# Patient Record
Sex: MALE | Race: WHITE | NOT HISPANIC OR LATINO | Employment: OTHER | URBAN - METROPOLITAN AREA
[De-identification: names, ages, dates, MRNs, and addresses within clinical notes are randomized per-mention and may not be internally consistent; named-entity substitution may affect disease eponyms.]

---

## 2018-01-26 ENCOUNTER — APPOINTMENT (EMERGENCY)
Dept: RADIOLOGY | Facility: HOSPITAL | Age: 83
DRG: 698 | End: 2018-01-26
Payer: MEDICARE

## 2018-01-26 ENCOUNTER — HOSPITAL ENCOUNTER (INPATIENT)
Facility: HOSPITAL | Age: 83
LOS: 6 days | Discharge: NURSING, REHAB OR LONG-TERM CARE | DRG: 698 | End: 2018-02-02
Attending: EMERGENCY MEDICINE | Admitting: INTERNAL MEDICINE
Payer: MEDICARE

## 2018-01-26 DIAGNOSIS — N39.0 UTI (URINARY TRACT INFECTION) DUE TO URINARY INDWELLING CATHETER (HCC): ICD-10-CM

## 2018-01-26 DIAGNOSIS — A41.9 SEVERE SEPSIS (HCC): Primary | ICD-10-CM

## 2018-01-26 DIAGNOSIS — T83.511A UTI (URINARY TRACT INFECTION) DUE TO URINARY INDWELLING CATHETER (HCC): ICD-10-CM

## 2018-01-26 DIAGNOSIS — J18.9 PNEUMONIA: ICD-10-CM

## 2018-01-26 DIAGNOSIS — I10 ESSENTIAL HYPERTENSION: ICD-10-CM

## 2018-01-26 DIAGNOSIS — R65.20 SEVERE SEPSIS (HCC): Primary | ICD-10-CM

## 2018-01-26 DIAGNOSIS — N39.0 ACUTE UTI: ICD-10-CM

## 2018-01-26 LAB
ALBUMIN SERPL BCP-MCNC: 2.5 G/DL (ref 3.5–5)
ALP SERPL-CCNC: 58 U/L (ref 46–116)
ALT SERPL W P-5'-P-CCNC: 53 U/L (ref 12–78)
ANION GAP SERPL CALCULATED.3IONS-SCNC: 14 MMOL/L (ref 4–13)
APTT PPP: 46 SECONDS (ref 23–35)
AST SERPL W P-5'-P-CCNC: 74 U/L (ref 5–45)
BASOPHILS # BLD MANUAL: 0 THOUSAND/UL (ref 0–0.1)
BASOPHILS NFR MAR MANUAL: 0 % (ref 0–1)
BILIRUB SERPL-MCNC: 0.3 MG/DL (ref 0.2–1)
BILIRUB UR QL STRIP: ABNORMAL
BUN SERPL-MCNC: 43 MG/DL (ref 5–25)
CALCIUM SERPL-MCNC: 8.4 MG/DL (ref 8.3–10.1)
CHLORIDE SERPL-SCNC: 110 MMOL/L (ref 100–108)
CLARITY UR: ABNORMAL
CO2 SERPL-SCNC: 25 MMOL/L (ref 21–32)
COLOR UR: ABNORMAL
CREAT SERPL-MCNC: 3.11 MG/DL (ref 0.6–1.3)
EOSINOPHIL # BLD MANUAL: 0 THOUSAND/UL (ref 0–0.4)
EOSINOPHIL NFR BLD MANUAL: 0 % (ref 0–6)
ERYTHROCYTE [DISTWIDTH] IN BLOOD BY AUTOMATED COUNT: 16.8 % (ref 11.6–15.1)
GFR SERPL CREATININE-BSD FRML MDRD: 18 ML/MIN/1.73SQ M
GLUCOSE SERPL-MCNC: 153 MG/DL (ref 65–140)
GLUCOSE UR STRIP-MCNC: NEGATIVE MG/DL
HCT VFR BLD AUTO: 48.2 % (ref 36.5–49.3)
HGB BLD-MCNC: 15.2 G/DL (ref 12–17)
HGB UR QL STRIP.AUTO: ABNORMAL
INR PPP: 1.68 (ref 0.86–1.16)
KETONES UR STRIP-MCNC: ABNORMAL MG/DL
LACTATE SERPL-SCNC: 3.9 MMOL/L (ref 0.5–2)
LEUKOCYTE ESTERASE UR QL STRIP: ABNORMAL
LYMPHOCYTES # BLD AUTO: 1.91 THOUSAND/UL (ref 0.6–4.47)
LYMPHOCYTES # BLD AUTO: 19 % (ref 14–44)
MAGNESIUM SERPL-MCNC: 2.1 MG/DL (ref 1.6–2.6)
MCH RBC QN AUTO: 27.1 PG (ref 26.8–34.3)
MCHC RBC AUTO-ENTMCNC: 31.5 G/DL (ref 31.4–37.4)
MCV RBC AUTO: 86 FL (ref 82–98)
MONOCYTES # BLD AUTO: 0.4 THOUSAND/UL (ref 0–1.22)
MONOCYTES NFR BLD: 4 % (ref 4–12)
NEUTROPHILS # BLD MANUAL: 7.05 THOUSAND/UL (ref 1.85–7.62)
NEUTS BAND NFR BLD MANUAL: 1 % (ref 0–8)
NEUTS SEG NFR BLD AUTO: 69 % (ref 43–75)
NITRITE UR QL STRIP: POSITIVE
PH UR STRIP.AUTO: 5 [PH] (ref 4.5–8)
PLATELET # BLD AUTO: 288 THOUSANDS/UL (ref 149–390)
PLATELET BLD QL SMEAR: ADEQUATE
PLATELET CLUMP BLD QL SMEAR: PRESENT
PMV BLD AUTO: 10.4 FL (ref 8.9–12.7)
POTASSIUM SERPL-SCNC: 3.3 MMOL/L (ref 3.5–5.3)
PROT SERPL-MCNC: 8.2 G/DL (ref 6.4–8.2)
PROT UR STRIP-MCNC: >=300 MG/DL
PROTHROMBIN TIME: 19.6 SECONDS (ref 12.1–14.4)
RBC # BLD AUTO: 5.61 MILLION/UL (ref 3.88–5.62)
RBC MORPH BLD: NORMAL
SODIUM SERPL-SCNC: 149 MMOL/L (ref 136–145)
SP GR UR STRIP.AUTO: 1.02 (ref 1–1.03)
TOTAL CELLS COUNTED SPEC: 100
UROBILINOGEN UR QL STRIP.AUTO: 1 E.U./DL
VARIANT LYMPHS # BLD AUTO: 7 %
WBC # BLD AUTO: 10.07 THOUSAND/UL (ref 4.31–10.16)

## 2018-01-26 PROCEDURE — 93005 ELECTROCARDIOGRAM TRACING: CPT

## 2018-01-26 PROCEDURE — 87077 CULTURE AEROBIC IDENTIFY: CPT

## 2018-01-26 PROCEDURE — 87186 SC STD MICRODIL/AGAR DIL: CPT

## 2018-01-26 PROCEDURE — 85027 COMPLETE CBC AUTOMATED: CPT

## 2018-01-26 PROCEDURE — 83735 ASSAY OF MAGNESIUM: CPT | Performed by: EMERGENCY MEDICINE

## 2018-01-26 PROCEDURE — 87086 URINE CULTURE/COLONY COUNT: CPT

## 2018-01-26 PROCEDURE — 85730 THROMBOPLASTIN TIME PARTIAL: CPT

## 2018-01-26 PROCEDURE — 87147 CULTURE TYPE IMMUNOLOGIC: CPT

## 2018-01-26 PROCEDURE — 85007 BL SMEAR W/DIFF WBC COUNT: CPT

## 2018-01-26 PROCEDURE — 96361 HYDRATE IV INFUSION ADD-ON: CPT

## 2018-01-26 PROCEDURE — 85610 PROTHROMBIN TIME: CPT

## 2018-01-26 PROCEDURE — 80053 COMPREHEN METABOLIC PANEL: CPT

## 2018-01-26 PROCEDURE — 87040 BLOOD CULTURE FOR BACTERIA: CPT

## 2018-01-26 PROCEDURE — 96365 THER/PROPH/DIAG IV INF INIT: CPT

## 2018-01-26 PROCEDURE — 83605 ASSAY OF LACTIC ACID: CPT

## 2018-01-26 PROCEDURE — 36415 COLL VENOUS BLD VENIPUNCTURE: CPT

## 2018-01-26 PROCEDURE — 71045 X-RAY EXAM CHEST 1 VIEW: CPT

## 2018-01-26 PROCEDURE — 81001 URINALYSIS AUTO W/SCOPE: CPT

## 2018-01-26 RX ORDER — LEVOFLOXACIN 5 MG/ML
500 INJECTION, SOLUTION INTRAVENOUS ONCE
Status: COMPLETED | OUTPATIENT
Start: 2018-01-26 | End: 2018-01-26

## 2018-01-26 RX ORDER — ACETAMINOPHEN 325 MG/1
650 TABLET ORAL EVERY 6 HOURS PRN
COMMUNITY

## 2018-01-26 RX ORDER — POTASSIUM CHLORIDE 14.9 MG/ML
20 INJECTION INTRAVENOUS ONCE
Status: COMPLETED | OUTPATIENT
Start: 2018-01-26 | End: 2018-01-27

## 2018-01-26 RX ADMIN — SODIUM CHLORIDE 1000 ML: 0.9 INJECTION, SOLUTION INTRAVENOUS at 23:22

## 2018-01-26 RX ADMIN — LEVOFLOXACIN 500 MG: 5 INJECTION, SOLUTION INTRAVENOUS at 22:39

## 2018-01-26 RX ADMIN — SODIUM CHLORIDE 1000 ML: 0.9 INJECTION, SOLUTION INTRAVENOUS at 22:36

## 2018-01-27 PROBLEM — A41.9 SEPSIS (HCC): Status: ACTIVE | Noted: 2018-01-27

## 2018-01-27 PROBLEM — R50.9 FEVER: Status: ACTIVE | Noted: 2018-01-27

## 2018-01-27 PROBLEM — N39.0 UTI (URINARY TRACT INFECTION) DUE TO URINARY INDWELLING CATHETER (HCC): Status: ACTIVE | Noted: 2018-01-27

## 2018-01-27 PROBLEM — T83.511A UTI (URINARY TRACT INFECTION) DUE TO URINARY INDWELLING CATHETER (HCC): Status: ACTIVE | Noted: 2018-01-27

## 2018-01-27 PROBLEM — J18.9 PNEUMONIA DUE TO INFECTIOUS ORGANISM: Status: ACTIVE | Noted: 2018-01-27

## 2018-01-27 PROBLEM — I95.9 HYPOTENSION: Status: ACTIVE | Noted: 2018-01-27

## 2018-01-27 LAB
ALBUMIN SERPL BCP-MCNC: 1.7 G/DL (ref 3.5–5)
ALP SERPL-CCNC: 42 U/L (ref 46–116)
ALT SERPL W P-5'-P-CCNC: 35 U/L (ref 12–78)
ANION GAP SERPL CALCULATED.3IONS-SCNC: 10 MMOL/L (ref 4–13)
ANION GAP SERPL CALCULATED.3IONS-SCNC: 12 MMOL/L (ref 4–13)
ANISOCYTOSIS BLD QL SMEAR: PRESENT
APTT PPP: 107 SECONDS (ref 23–35)
APTT PPP: 39 SECONDS (ref 23–35)
APTT PPP: 84 SECONDS (ref 23–35)
AST SERPL W P-5'-P-CCNC: 53 U/L (ref 5–45)
BACTERIA UR QL AUTO: ABNORMAL /HPF
BACTERIA UR QL AUTO: ABNORMAL /HPF
BASOPHILS # BLD MANUAL: 0 THOUSAND/UL (ref 0–0.1)
BASOPHILS NFR MAR MANUAL: 0 % (ref 0–1)
BILIRUB SERPL-MCNC: 0.2 MG/DL (ref 0.2–1)
BILIRUB UR QL STRIP: NEGATIVE
BUN SERPL-MCNC: 41 MG/DL (ref 5–25)
BUN SERPL-MCNC: 42 MG/DL (ref 5–25)
CALCIUM SERPL-MCNC: 7.2 MG/DL (ref 8.3–10.1)
CALCIUM SERPL-MCNC: 7.2 MG/DL (ref 8.3–10.1)
CHLORIDE SERPL-SCNC: 116 MMOL/L (ref 100–108)
CHLORIDE SERPL-SCNC: 118 MMOL/L (ref 100–108)
CLARITY UR: ABNORMAL
CO2 SERPL-SCNC: 22 MMOL/L (ref 21–32)
CO2 SERPL-SCNC: 23 MMOL/L (ref 21–32)
COLOR UR: YELLOW
CREAT SERPL-MCNC: 2.06 MG/DL (ref 0.6–1.3)
CREAT SERPL-MCNC: 2.73 MG/DL (ref 0.6–1.3)
DACRYOCYTES BLD QL SMEAR: PRESENT
EOSINOPHIL # BLD MANUAL: 0 THOUSAND/UL (ref 0–0.4)
EOSINOPHIL NFR BLD MANUAL: 0 % (ref 0–6)
ERYTHROCYTE [DISTWIDTH] IN BLOOD BY AUTOMATED COUNT: 16.7 % (ref 11.6–15.1)
FINE GRAN CASTS URNS QL MICRO: ABNORMAL /LPF
FLUAV AG SPEC QL: NORMAL
FLUBV AG SPEC QL: NORMAL
GFR SERPL CREATININE-BSD FRML MDRD: 21 ML/MIN/1.73SQ M
GFR SERPL CREATININE-BSD FRML MDRD: 30 ML/MIN/1.73SQ M
GLUCOSE SERPL-MCNC: 112 MG/DL (ref 65–140)
GLUCOSE SERPL-MCNC: 130 MG/DL (ref 65–140)
GLUCOSE SERPL-MCNC: 134 MG/DL (ref 65–140)
GLUCOSE SERPL-MCNC: 139 MG/DL (ref 65–140)
GLUCOSE SERPL-MCNC: 139 MG/DL (ref 65–140)
GLUCOSE SERPL-MCNC: 97 MG/DL (ref 65–140)
GLUCOSE UR STRIP-MCNC: NEGATIVE MG/DL
HCT VFR BLD AUTO: 39.4 % (ref 36.5–49.3)
HGB BLD-MCNC: 12 G/DL (ref 12–17)
HGB UR QL STRIP.AUTO: ABNORMAL
HYPERCHROMIA BLD QL SMEAR: PRESENT
KETONES UR STRIP-MCNC: NEGATIVE MG/DL
L PNEUMO1 AG UR QL IA.RAPID: NEGATIVE
LACTATE SERPL-SCNC: 1.6 MMOL/L (ref 0.5–2)
LACTATE SERPL-SCNC: 2.1 MMOL/L (ref 0.5–2)
LACTATE SERPL-SCNC: 2.5 MMOL/L (ref 0.5–2)
LACTATE SERPL-SCNC: 4.6 MMOL/L (ref 0.5–2)
LEUKOCYTE ESTERASE UR QL STRIP: ABNORMAL
LG PLATELETS BLD QL SMEAR: PRESENT
LYMPHOCYTES # BLD AUTO: 1.16 THOUSAND/UL (ref 0.6–4.47)
LYMPHOCYTES # BLD AUTO: 14 % (ref 14–44)
MAGNESIUM SERPL-MCNC: 1.7 MG/DL (ref 1.6–2.6)
MCH RBC QN AUTO: 26.3 PG (ref 26.8–34.3)
MCHC RBC AUTO-ENTMCNC: 30.5 G/DL (ref 31.4–37.4)
MCV RBC AUTO: 86 FL (ref 82–98)
MONOCYTES # BLD AUTO: 0.17 THOUSAND/UL (ref 0–1.22)
MONOCYTES NFR BLD: 2 % (ref 4–12)
MUCOUS THREADS UR QL AUTO: ABNORMAL
NEUTROPHILS # BLD MANUAL: 6.77 THOUSAND/UL (ref 1.85–7.62)
NEUTS BAND NFR BLD MANUAL: 12 % (ref 0–8)
NEUTS SEG NFR BLD AUTO: 70 % (ref 43–75)
NITRITE UR QL STRIP: NEGATIVE
NON-SQ EPI CELLS URNS QL MICRO: ABNORMAL /HPF
NON-SQ EPI CELLS URNS QL MICRO: ABNORMAL /HPF
OVALOCYTES BLD QL SMEAR: PRESENT
PH UR STRIP.AUTO: 6 [PH] (ref 4.5–8)
PHOSPHATE SERPL-MCNC: 4.5 MG/DL (ref 2.3–4.1)
PLATELET # BLD AUTO: 224 THOUSANDS/UL (ref 149–390)
PLATELET # BLD AUTO: 234 THOUSANDS/UL (ref 149–390)
PLATELET BLD QL SMEAR: ADEQUATE
PMV BLD AUTO: 10.3 FL (ref 8.9–12.7)
PMV BLD AUTO: 9.9 FL (ref 8.9–12.7)
POIKILOCYTOSIS BLD QL SMEAR: PRESENT
POLYCHROMASIA BLD QL SMEAR: PRESENT
POTASSIUM SERPL-SCNC: 3.1 MMOL/L (ref 3.5–5.3)
POTASSIUM SERPL-SCNC: 3.6 MMOL/L (ref 3.5–5.3)
PROT SERPL-MCNC: 6 G/DL (ref 6.4–8.2)
PROT UR STRIP-MCNC: ABNORMAL MG/DL
RBC # BLD AUTO: 4.56 MILLION/UL (ref 3.88–5.62)
RBC #/AREA URNS AUTO: ABNORMAL /HPF
RBC #/AREA URNS AUTO: ABNORMAL /HPF
RBC MORPH BLD: PRESENT
RSV B RNA SPEC QL NAA+PROBE: NORMAL
S PNEUM AG UR QL: NEGATIVE
SODIUM SERPL-SCNC: 150 MMOL/L (ref 136–145)
SODIUM SERPL-SCNC: 151 MMOL/L (ref 136–145)
SP GR UR STRIP.AUTO: 1.02 (ref 1–1.03)
TOTAL CELLS COUNTED SPEC: 100
UROBILINOGEN UR QL STRIP.AUTO: 0.2 E.U./DL
VARIANT LYMPHS # BLD AUTO: 2 %
WBC # BLD AUTO: 8.25 THOUSAND/UL (ref 4.31–10.16)
WBC #/AREA URNS AUTO: ABNORMAL /HPF
WBC #/AREA URNS AUTO: ABNORMAL /HPF
WBC CLUMPS # UR AUTO: PRESENT /UL
WBC TOXIC VACUOLES BLD QL SMEAR: PRESENT

## 2018-01-27 PROCEDURE — 3C1ZX8Z IRRIGATION OF INDWELLING DEVICE USING IRRIGATING SUBSTANCE, EXTERNAL APPROACH: ICD-10-PCS | Performed by: UROLOGY

## 2018-01-27 PROCEDURE — 87077 CULTURE AEROBIC IDENTIFY: CPT | Performed by: INTERNAL MEDICINE

## 2018-01-27 PROCEDURE — 80053 COMPREHEN METABOLIC PANEL: CPT | Performed by: INTERNAL MEDICINE

## 2018-01-27 PROCEDURE — 51705 CHANGE OF BLADDER TUBE: CPT | Performed by: UROLOGY

## 2018-01-27 PROCEDURE — 92610 EVALUATE SWALLOWING FUNCTION: CPT

## 2018-01-27 PROCEDURE — 05H433Z INSERTION OF INFUSION DEVICE INTO LEFT INNOMINATE VEIN, PERCUTANEOUS APPROACH: ICD-10-PCS | Performed by: EMERGENCY MEDICINE

## 2018-01-27 PROCEDURE — 87449 NOS EACH ORGANISM AG IA: CPT | Performed by: INTERNAL MEDICINE

## 2018-01-27 PROCEDURE — 96375 TX/PRO/DX INJ NEW DRUG ADDON: CPT

## 2018-01-27 PROCEDURE — 85007 BL SMEAR W/DIFF WBC COUNT: CPT | Performed by: INTERNAL MEDICINE

## 2018-01-27 PROCEDURE — 99223 1ST HOSP IP/OBS HIGH 75: CPT | Performed by: UROLOGY

## 2018-01-27 PROCEDURE — 80048 BASIC METABOLIC PNL TOTAL CA: CPT | Performed by: INTERNAL MEDICINE

## 2018-01-27 PROCEDURE — 87147 CULTURE TYPE IMMUNOLOGIC: CPT | Performed by: INTERNAL MEDICINE

## 2018-01-27 PROCEDURE — 87186 SC STD MICRODIL/AGAR DIL: CPT | Performed by: INTERNAL MEDICINE

## 2018-01-27 PROCEDURE — 82948 REAGENT STRIP/BLOOD GLUCOSE: CPT

## 2018-01-27 PROCEDURE — 82272 OCCULT BLD FECES 1-3 TESTS: CPT

## 2018-01-27 PROCEDURE — 36415 COLL VENOUS BLD VENIPUNCTURE: CPT | Performed by: INTERNAL MEDICINE

## 2018-01-27 PROCEDURE — 85027 COMPLETE CBC AUTOMATED: CPT | Performed by: INTERNAL MEDICINE

## 2018-01-27 PROCEDURE — 99285 EMERGENCY DEPT VISIT HI MDM: CPT

## 2018-01-27 PROCEDURE — 84100 ASSAY OF PHOSPHORUS: CPT | Performed by: INTERNAL MEDICINE

## 2018-01-27 PROCEDURE — 87086 URINE CULTURE/COLONY COUNT: CPT | Performed by: INTERNAL MEDICINE

## 2018-01-27 PROCEDURE — 85049 AUTOMATED PLATELET COUNT: CPT | Performed by: INTERNAL MEDICINE

## 2018-01-27 PROCEDURE — 99291 CRITICAL CARE FIRST HOUR: CPT | Performed by: INTERNAL MEDICINE

## 2018-01-27 PROCEDURE — 0T2BX0Z CHANGE DRAINAGE DEVICE IN BLADDER, EXTERNAL APPROACH: ICD-10-PCS | Performed by: UROLOGY

## 2018-01-27 PROCEDURE — 83605 ASSAY OF LACTIC ACID: CPT | Performed by: INTERNAL MEDICINE

## 2018-01-27 PROCEDURE — 85730 THROMBOPLASTIN TIME PARTIAL: CPT | Performed by: INTERNAL MEDICINE

## 2018-01-27 PROCEDURE — 83735 ASSAY OF MAGNESIUM: CPT | Performed by: INTERNAL MEDICINE

## 2018-01-27 PROCEDURE — 87081 CULTURE SCREEN ONLY: CPT | Performed by: INTERNAL MEDICINE

## 2018-01-27 PROCEDURE — 87798 DETECT AGENT NOS DNA AMP: CPT | Performed by: EMERGENCY MEDICINE

## 2018-01-27 PROCEDURE — 81001 URINALYSIS AUTO W/SCOPE: CPT | Performed by: INTERNAL MEDICINE

## 2018-01-27 RX ORDER — MELATONIN
1000 DAILY
COMMUNITY

## 2018-01-27 RX ORDER — MORPHINE SULFATE 2 MG/ML
2 INJECTION, SOLUTION INTRAMUSCULAR; INTRAVENOUS ONCE
Status: COMPLETED | OUTPATIENT
Start: 2018-01-27 | End: 2018-01-27

## 2018-01-27 RX ORDER — POTASSIUM CHLORIDE 20 MEQ/1
20 TABLET, EXTENDED RELEASE ORAL 2 TIMES DAILY
Status: ON HOLD | COMMUNITY
End: 2018-02-13

## 2018-01-27 RX ORDER — ACETAMINOPHEN 325 MG/1
650 TABLET ORAL EVERY 6 HOURS PRN
Status: DISCONTINUED | OUTPATIENT
Start: 2018-01-27 | End: 2018-02-02 | Stop reason: HOSPADM

## 2018-01-27 RX ORDER — METOPROLOL SUCCINATE 100 MG/1
100 TABLET, EXTENDED RELEASE ORAL DAILY
COMMUNITY

## 2018-01-27 RX ORDER — DEXTROSE MONOHYDRATE 50 MG/ML
125 INJECTION, SOLUTION INTRAVENOUS CONTINUOUS
Status: DISCONTINUED | OUTPATIENT
Start: 2018-01-27 | End: 2018-01-28

## 2018-01-27 RX ORDER — FUROSEMIDE 80 MG
80 TABLET ORAL 2 TIMES DAILY
Status: ON HOLD | COMMUNITY
End: 2018-02-13

## 2018-01-27 RX ORDER — POTASSIUM CHLORIDE 14.9 MG/ML
20 INJECTION INTRAVENOUS
Status: DISCONTINUED | OUTPATIENT
Start: 2018-01-27 | End: 2018-01-27

## 2018-01-27 RX ORDER — HEPARIN SODIUM 1000 [USP'U]/ML
2000 INJECTION, SOLUTION INTRAVENOUS; SUBCUTANEOUS AS NEEDED
Status: DISCONTINUED | OUTPATIENT
Start: 2018-01-27 | End: 2018-02-01

## 2018-01-27 RX ORDER — LISINOPRIL 40 MG/1
40 TABLET ORAL
COMMUNITY
End: 2018-02-02 | Stop reason: HOSPADM

## 2018-01-27 RX ORDER — HEPARIN SODIUM 10000 [USP'U]/100ML
3-20 INJECTION, SOLUTION INTRAVENOUS
Status: DISCONTINUED | OUTPATIENT
Start: 2018-01-27 | End: 2018-02-01

## 2018-01-27 RX ORDER — VANCOMYCIN HYDROCHLORIDE 1 G/200ML
12.5 INJECTION, SOLUTION INTRAVENOUS EVERY 24 HOURS
Status: DISCONTINUED | OUTPATIENT
Start: 2018-01-27 | End: 2018-01-29

## 2018-01-27 RX ORDER — FERROUS SULFATE 325(65) MG
325 TABLET ORAL
COMMUNITY

## 2018-01-27 RX ORDER — RISPERIDONE 0.25 MG/1
0.5 TABLET, FILM COATED ORAL 2 TIMES DAILY
COMMUNITY

## 2018-01-27 RX ORDER — ACETAMINOPHEN 650 MG/1
650 SUPPOSITORY RECTAL ONCE
Status: COMPLETED | OUTPATIENT
Start: 2018-01-27 | End: 2018-01-27

## 2018-01-27 RX ORDER — DIVALPROEX SODIUM 250 MG/1
250 TABLET, DELAYED RELEASE ORAL 2 TIMES DAILY
COMMUNITY

## 2018-01-27 RX ORDER — DILTIAZEM HYDROCHLORIDE 120 MG/1
120 CAPSULE, EXTENDED RELEASE ORAL DAILY
COMMUNITY

## 2018-01-27 RX ORDER — SERTRALINE HYDROCHLORIDE 100 MG/1
50 TABLET, FILM COATED ORAL DAILY
COMMUNITY

## 2018-01-27 RX ORDER — HEPARIN SODIUM 1000 [USP'U]/ML
4000 INJECTION, SOLUTION INTRAVENOUS; SUBCUTANEOUS AS NEEDED
Status: DISCONTINUED | OUTPATIENT
Start: 2018-01-27 | End: 2018-02-01

## 2018-01-27 RX ORDER — HYDRALAZINE HYDROCHLORIDE 50 MG/1
50 TABLET, FILM COATED ORAL 3 TIMES DAILY
COMMUNITY

## 2018-01-27 RX ORDER — DEXTROSE, SODIUM CHLORIDE, AND POTASSIUM CHLORIDE 5; .45; .3 G/100ML; G/100ML; G/100ML
125 INJECTION INTRAVENOUS CONTINUOUS
Status: DISCONTINUED | OUTPATIENT
Start: 2018-01-27 | End: 2018-01-27

## 2018-01-27 RX ORDER — SODIUM CHLORIDE 9 MG/ML
125 INJECTION, SOLUTION INTRAVENOUS CONTINUOUS
Status: DISCONTINUED | OUTPATIENT
Start: 2018-01-27 | End: 2018-01-27

## 2018-01-27 RX ORDER — SODIUM CHLORIDE 450 MG/100ML
150 INJECTION, SOLUTION INTRAVENOUS CONTINUOUS
Status: DISCONTINUED | OUTPATIENT
Start: 2018-01-27 | End: 2018-01-27

## 2018-01-27 RX ORDER — OSELTAMIVIR PHOSPHATE 6 MG/ML
30 FOR SUSPENSION ORAL EVERY 24 HOURS
Status: DISCONTINUED | OUTPATIENT
Start: 2018-01-27 | End: 2018-01-27

## 2018-01-27 RX ORDER — LORAZEPAM 2 MG/ML
0.25 INJECTION INTRAMUSCULAR EVERY 6 HOURS PRN
Status: DISCONTINUED | OUTPATIENT
Start: 2018-01-27 | End: 2018-02-02 | Stop reason: HOSPADM

## 2018-01-27 RX ORDER — ATORVASTATIN CALCIUM 10 MG/1
10 TABLET, FILM COATED ORAL DAILY
COMMUNITY

## 2018-01-27 RX ORDER — ONDANSETRON 2 MG/ML
4 INJECTION INTRAMUSCULAR; INTRAVENOUS EVERY 6 HOURS PRN
Status: DISCONTINUED | OUTPATIENT
Start: 2018-01-27 | End: 2018-02-02 | Stop reason: HOSPADM

## 2018-01-27 RX ORDER — LORAZEPAM 0.5 MG/1
0.5 TABLET ORAL 3 TIMES DAILY
COMMUNITY
End: 2018-02-02 | Stop reason: HOSPADM

## 2018-01-27 RX ADMIN — VALPROATE SODIUM 250 MG: 100 INJECTION, SOLUTION INTRAVENOUS at 21:21

## 2018-01-27 RX ADMIN — Medication 500 MG: at 06:46

## 2018-01-27 RX ADMIN — OSELTAMIVIR PHOSPHATE 30 MG: 75 CAPSULE ORAL at 09:26

## 2018-01-27 RX ADMIN — METRONIDAZOLE 500 MG: 500 INJECTION, SOLUTION INTRAVENOUS at 04:33

## 2018-01-27 RX ADMIN — DEXTROSE 125 ML/HR: 5 SOLUTION INTRAVENOUS at 22:33

## 2018-01-27 RX ADMIN — METRONIDAZOLE 500 MG: 500 INJECTION, SOLUTION INTRAVENOUS at 11:03

## 2018-01-27 RX ADMIN — Medication 500 MG: at 18:10

## 2018-01-27 RX ADMIN — VANCOMYCIN HYDROCHLORIDE 1000 MG: 1 INJECTION, SOLUTION INTRAVENOUS at 04:35

## 2018-01-27 RX ADMIN — POTASSIUM CHLORIDE 20 MEQ: 200 INJECTION, SOLUTION INTRAVENOUS at 00:15

## 2018-01-27 RX ADMIN — POTASSIUM CHLORIDE 20 MEQ: 200 INJECTION, SOLUTION INTRAVENOUS at 13:46

## 2018-01-27 RX ADMIN — ACETAMINOPHEN 650 MG: 650 SUPPOSITORY RECTAL at 00:27

## 2018-01-27 RX ADMIN — DEXTROSE, SODIUM CHLORIDE, AND POTASSIUM CHLORIDE 125 ML/HR: 5; .45; .3 INJECTION INTRAVENOUS at 11:03

## 2018-01-27 RX ADMIN — DEXTROSE, SODIUM CHLORIDE, AND POTASSIUM CHLORIDE 125 ML/HR: 5; .45; .3 INJECTION INTRAVENOUS at 20:38

## 2018-01-27 RX ADMIN — POTASSIUM CHLORIDE 20 MEQ: 200 INJECTION, SOLUTION INTRAVENOUS at 11:03

## 2018-01-27 RX ADMIN — HEPARIN SODIUM AND DEXTROSE 11.1 UNITS/KG/HR: 10000; 5 INJECTION INTRAVENOUS at 06:04

## 2018-01-27 RX ADMIN — SODIUM CHLORIDE 125 ML/HR: 0.9 INJECTION, SOLUTION INTRAVENOUS at 00:22

## 2018-01-27 RX ADMIN — METRONIDAZOLE 500 MG: 500 INJECTION, SOLUTION INTRAVENOUS at 19:38

## 2018-01-27 RX ADMIN — SODIUM CHLORIDE 150 ML/HR: 0.45 INJECTION, SOLUTION INTRAVENOUS at 04:20

## 2018-01-27 RX ADMIN — VALPROATE SODIUM 250 MG: 100 INJECTION, SOLUTION INTRAVENOUS at 09:30

## 2018-01-27 RX ADMIN — MORPHINE SULFATE 2 MG: 2 INJECTION, SOLUTION INTRAMUSCULAR; INTRAVENOUS at 01:48

## 2018-01-27 NOTE — CONSULTS
Consultation - Infectious Disease   Cayetano Benson [de-identified] y o  male MRN: [id number]  Unit/Bed#: -02 Encounter: [id number]      Assessment/Plan   1  Sepsis/Lactic acidosis/Renal insufficiency/Obstructive uropathy/Pyuria/Left femoral CVC: [de-identified] yo male presenting from [facility] w/ worsening mental status despite three days of empiric levofloxacin for cUTI/PNA  UA collected from old suprapubic catheter was notable for rufus pyuria and CXR with RLL linear atelectasis vs scaring  Suspect cUTI most likely etiology given suprapubic catheter and underwhelming findings on CXR  Urology has changed Sp catheter today with cultures pending       - will repeat UA/micro and cx on new sample now that SP catheter exchanged today  - will f/u pending blood cx  - will repeat CXR tomorrow now that pt volume resuscitated   - will stop oseltamavir now that influenza A/B PCR negative  - continue broad-spectrum Abx while awaiting pending cultures  - will check vanco trough before 4th dose  - dose Abx for acute on chronic renal insufficiency      History of Present Illness   Physician Requesting Consult: Drew Guerrier MD  Reason for Consult / Principal Problem: Sepsis    HPI: Cayetano Benson is a [de-identified]y o  year old male with H/O dementia, DM, a fib w/ AICD, CKD, HTN, AAA s/p repair, hx of VTE, TERRELL s/p suprapubic catheter, who presented to ED from [facility] on 1/26/18 w/ report of AMS  Pt was reportedly in USOH until ~4 days PTA when he was reportedly not feeing well  At the [facility], he was diagnosed with a UTI and PNA in setting of cough and SOB  Pt was started on PO levofloxacin ~3 days PTA  Per EMR, pt initially improved; however, subsequently declined with worsening confusion and agitation  He notably had a temp to 100 6 @ SNF w/ cough and PALOMO  Pt was brought to ED where he was noted to have a lactic acidosis with grossly positive UA obtained from old SP catheter and CXR with RLL linear atelectasis vs  scarring    Pt was volume resuscitated and started on broad-spectrum Abx  ID consulted for additional management  Call to pt's SNF: Labs on 1/23 collected notable for WBC of 6 5 and Cr of 1 6  Urine cx collected at that time w/ >100K CFU of >3 species of Gram positive and Gram negative organisms  Levofloxacin 750mg PO q48hrs initiated on 1/23/18  Inpatient consult to Infectious Diseases  Consult performed by: Lv Pryor  Consult ordered by: Dilcia Salazar: Unable to obtain due to clinical status    Historical Information   History reviewed  No pertinent past medical history  History reviewed  No pertinent surgical history  Social History   History   Alcohol use Not on file     History   Drug use: Unknown     History   Smoking Status    Former Smoker   Smokeless Tobacco    Never Used     History reviewed  No pertinent family history      Meds/Allergies   Abx:  Levofloxacin - 1/23-1/26  Cefepime - 1/27-current  Vancomycin -  1/27-current  Metronidazole - 1/27-current  oseltamavir - 1/27-current      Current Facility-Administered Medications:     acetaminophen (TYLENOL) tablet 650 mg, 650 mg, Oral, Q6H PRN, Kianna Moser MD    cefepime (MAXIPIME) 500 mg in sodium chloride 0 9 % 50 mL IVPB, 500 mg, Intravenous, Q12H, Kianna TOMLIN MD, Last Rate: 100 mL/hr at 01/27/18 0646, 500 mg at 01/27/18 0646    dextrose 5 % and sodium chloride 0 45 % with KCl 40 mEq/L infusion (premix), 125 mL/hr, Intravenous, Continuous, Yamil Harper MD, Last Rate: 125 mL/hr at 01/27/18 1103, 125 mL/hr at 01/27/18 1103    heparin (porcine) 25,000 units in 250 mL infusion (premix), 3-20 Units/kg/hr (Order-Specific), Intravenous, Titrated, Kianna TOMLIN MD, Last Rate: 10 mL/hr at 01/27/18 0604, 11 1 Units/kg/hr at 01/27/18 0604    heparin (porcine) injection 2,000 Units, 2,000 Units, Intravenous, PRN, Kianna Moser MD    heparin (porcine) injection 4,000 Units, 4,000 Units, Intravenous, PRN, Kianna Frye MD NABOR    insulin lispro (HumaLOG) 100 units/mL subcutaneous injection 1-6 Units, 1-6 Units, Subcutaneous, Q6H **AND** Fingerstick Glucose (POCT), , , Q6H, Mandeep Greta Carrel, MD    LORazepam (ATIVAN) 2 mg/mL injection 0 25 mg, 0 25 mg, Intravenous, Q6H PRN, Kianna Caballero MD    metroNIDAZOLE (FLAGYL) IVPB (premix) 500 mg, 500 mg, Intravenous, Q8H, Kianna TOMLIN MD, Last Rate: 200 mL/hr at 01/27/18 1103, 500 mg at 01/27/18 1103    ondansetron (ZOFRAN) injection 4 mg, 4 mg, Intravenous, Q6H PRN, Kianna Caballero MD    oseltamivir (TAMIFLU) oral suspension 30 mg, 30 mg, Oral, Q24H, Kianna Caballero MD, 30 mg at 01/27/18 0926    valproate (DEPACON) 250 mg in sodium chloride 0 9 % 50 mL IVPB, 250 mg, Intravenous, Q12H, Kianna TOMLIN MD, Last Rate: 50 mL/hr at 01/27/18 0930, 250 mg at 01/27/18 0930    vancomycin (VANCOCIN) IVPB (premix) 1,000 mg, 12 5 mg/kg (Adjusted), Intravenous, Q24H, Kianna Caballero MD, Last Rate: 200 mL/hr at 01/27/18 0435, 1,000 mg at 01/27/18 0435    No Known Allergies      Intake/Output Summary (Last 24 hours) at 01/27/18 1633  Last data filed at 01/27/18 1500   Gross per 24 hour   Intake                0 ml   Output              535 ml   Net             -535 ml       PE:  Elderly drowsy male, NAD  VSS, Tmax:98 9  HEENT: NC in place on 1L O2, poor dentition, no icterus   NECK:Supple  CARDIAC: RRR, no MRG, ICD pocket well-healed without erythema or warmth  LUNGS: ant fields w/ coarse BS anteriorly   ABDOMEN: decreased BS, soft, SP catheter in place w/ mild erythema and scant cloudy drainage at insertion site  EXTREMITIES: no edema or joint effusions  SKIN: no rash  NEURO: opens eyes to voice, tracks, unable to follow commands    Invasive Devices:   CVC Central Lines 01/27/18 Triple 20cm (Active)   Site Assessment Clean; Intact;Dry 1/27/2018  5:00 AM   Proximal Lumen (Red Port-PICC only) Status Flushed;Blood return noted; Infusing 1/27/2018  5:00 AM   Medial Lumen Status Blood return noted; Flushed; Infusing 1/27/2018  5:00 AM   Distal Lumen ([place] Port-PICC only) Status Blood return noted; Flushed;Normal saline locked 1/27/2018  5:00 AM       Peripheral IV 01/26/18 Left Antecubital (Active)   Site Assessment Clean;Dry; Intact 1/27/2018  5:00 AM   Dressing Type Transparent 1/27/2018  5:00 AM   Line Status Flushed;Saline locked 1/27/2018  5:00 AM   Dressing Status Clean;Dry; Intact 1/27/2018  5:00 AM       Suprapubic Catheter (Active)   Site Assessment Red; Other (Comment) 1/27/2018  4:35 AM   Dressing Status Clean;Dry; Intact 1/27/2018  4:35 AM   Dressing Type Split gauze 1/27/2018  4:35 AM   Output (mL) 275 mL 1/27/2018  3:00 PM           Lab Results:   Admission on 01/26/2018   Component Date Value    PTT 01/26/2018 46*    Protime 01/26/2018 19 6*    INR 01/26/2018 1 68*    WBC 01/26/2018 10 07     RBC 01/26/2018 5 61     Hemoglobin 01/26/2018 15 2     Hematocrit 01/26/2018 48 2     MCV 01/26/2018 86     MCH 01/26/2018 27 1     MCHC 01/26/2018 31 5     RDW 01/26/2018 16 8*    MPV 01/26/2018 10 4     Platelets 39/92/0240 288     Sodium 01/26/2018 149*    Potassium 01/26/2018 3 3*    Chloride 01/26/2018 110*    CO2 01/26/2018 25     Anion Gap 01/26/2018 14*    BUN 01/26/2018 43*    Creatinine 01/26/2018 3 11*    Glucose 01/26/2018 153*    Calcium 01/26/2018 8 4     AST 01/26/2018 74*    ALT 01/26/2018 53     Alkaline Phosphatase 01/26/2018 58     Total Protein 01/26/2018 8 2     Albumin 01/26/2018 2 5*    Total Bilirubin 01/26/2018 0 30     eGFR 01/26/2018 18     LACTIC ACID 01/26/2018 3 9*    Color, UA 01/26/2018 Red     Clarity, UA 01/26/2018 Turbid     Specific Gravity, UA 01/26/2018 1 025     pH, UA 01/26/2018 5 0     Leukocytes, UA 01/26/2018 Moderate*    Nitrite, UA 01/26/2018 Positive*    Protein, UA 01/26/2018 >=300*    Glucose, UA 01/26/2018 Negative     Ketones, UA 01/26/2018 15 (1+)*    Urobilinogen, UA 01/26/2018 1 0     Bilirubin, UA 01/26/2018 Interference- unable to analyze*    Blood, UA 01/26/2018 Large*    Segmented % 01/26/2018 69     Bands % 01/26/2018 1     Lymphocytes % 01/26/2018 19     Monocytes % 01/26/2018 4     Eosinophils % 01/26/2018 0     Basophils % 01/26/2018 0     Atypical Lymphocytes % 01/26/2018 7*    Absolute Neutrophils 01/26/2018 7 05     Lymphocytes Absolute 01/26/2018 1 91     Monocytes Absolute 01/26/2018 0 40     Eosinophils Absolute 01/26/2018 0 00     Basophils Absolute 01/26/2018 0 00     Total Counted 01/26/2018 100     RBC Morphology 01/26/2018 Normal     Platelet Estimate 11/94/6270 Adequate     Clumped Platelets 86/03/6724 Present     Magnesium 01/26/2018 2 1     RBC, UA 01/26/2018 Innumerable*    WBC, UA 01/26/2018 Innumerable*    Epithelial Cells 01/26/2018 Field obscured, unable to enumerate*    Bacteria, UA 01/26/2018 Field obscured, unable to enumerate*    INFLU A PCR 01/27/2018 None Detected     INFLU B PCR 01/27/2018 None Detected     RSV PCR 01/27/2018 None Detected     LACTIC ACID 01/27/2018 4 6*    LACTIC ACID 01/27/2018 2 5*    PTT 01/27/2018 39*    Strep pneumoniae antigen* 01/27/2018 Negative     Legionella Urinary Antig* 01/27/2018 Negative     Platelets 34/28/7983 234     MPV 01/27/2018 10 3     Sodium 01/27/2018 151*    Potassium 01/27/2018 3 1*    Chloride 01/27/2018 116*    CO2 01/27/2018 23     Anion Gap 01/27/2018 12     BUN 01/27/2018 42*    Creatinine 01/27/2018 2 73*    Glucose 01/27/2018 134     Calcium 01/27/2018 7 2*    AST 01/27/2018 53*    ALT 01/27/2018 35     Alkaline Phosphatase 01/27/2018 42*    Total Protein 01/27/2018 6 0*    Albumin 01/27/2018 1 7*    Total Bilirubin 01/27/2018 0 20     eGFR 01/27/2018 21     Magnesium 01/27/2018 1 7     Phosphorus 01/27/2018 4 5*    WBC 01/27/2018 8 25     RBC 01/27/2018 4 56     Hemoglobin 01/27/2018 12 0     Hematocrit 01/27/2018 39 4     MCV 01/27/2018 86     MCH 01/27/2018 26 3*    MCHC 01/27/2018 30 5*    RDW 01/27/2018 16 7*    MPV 01/27/2018 9 9     Platelets 02/57/0242 224     LACTIC ACID 01/27/2018 2 1*    Segmented % 01/27/2018 70     Bands % 01/27/2018 12*    Lymphocytes % 01/27/2018 14     Monocytes % 01/27/2018 2*    Eosinophils % 01/27/2018 0     Basophils % 01/27/2018 0     Atypical Lymphocytes % 01/27/2018 2*    Absolute Neutrophils 01/27/2018 6 77     Lymphocytes Absolute 01/27/2018 1 16     Monocytes Absolute 01/27/2018 0 17     Eosinophils Absolute 01/27/2018 0 00     Basophils Absolute 01/27/2018 0 00     Total Counted 01/27/2018 100     Toxic Vacuolated Neutrop* 01/27/2018 Present     RBC Morphology 01/27/2018 Present     Anisocytosis 01/27/2018 Present     Hypochromia 01/27/2018 Present     Ovalocytes 01/27/2018 Present     Poikilocytes 01/27/2018 Present     Polychromasia 01/27/2018 Present     Tear Drop Cells 01/27/2018 Present     Platelet Estimate 54/96/9792 Adequate     Large Platelet 78/49/9640 Present     POC Glucose 01/27/2018 139     PTT 01/27/2018 84*    LACTIC ACID 01/27/2018 1 6     POC Glucose 01/27/2018 97     POC Glucose 01/27/2018 112     POC Glucose 01/27/2018 130      Imaging Studies: I have personally reviewed pertinent reports  and I have personally reviewed pertinent films in PACS  EKG, Pathology, and Other Studies: I have personally reviewed pertinent reports        Culture  No results found for: BLOODCX  No results found for: WOUNDCULT  No results found for: URINECX  No results found for: SPUTUMCULTUR    Principal Problem:    Sepsis ([address] )  Active Problems:    Fever    UTI (urinary tract infection) due to urinary indwelling catheter ([facility] [address] )    Hypotension    Pneumonia due to infectious organism

## 2018-01-27 NOTE — CASE MANAGEMENT
Initial Clinical Review    Admission: Date/Time/Statement: 1/27/18 @ [address] Admission (expected length of stay for this patient is greater than two midnights)     Standing Status:   Standing     Number of Occurrences:   1     Order Specific Question:   Admitting Physician     Answer:   Tiffanie Abreu [[id number]]     Order Specific Question:   Level of Care     Answer:   Level 1 Stepdown [13]     Order Specific Question:   Estimated length of stay     Answer:   More than 2 Midnights     Order Specific Question:   Certification     Answer:   I certify that inpatient services are medically necessary for this patient for a duration of greater than two midnights  See H&P and MD Progress Notes for additional information about the patient's course of treatment  ED: Date/Time/Mode of Arrival:   ED Arrival Information     Expected Arrival Acuity Means of Arrival Escorted By Service Admission Type    1/26/2018 21:49 1/26/2018 21:51 [facility] [address] [place] [address]) General Medicine Emergency    Arrival Complaint    FEVER          Chief Complaint:   Chief Complaint   Patient presents with    Altered Mental Status     Pt to ED from nursing home with altered mental status, currently being treated for pneumonia and UTI  History of Illness: [de-identified] y o  male with pmhx of diabetes type 2, hypertension, AAA s/p repair, hyperlipidemia, afib, s/p PPM, CKD with unknown baseline, history of PE and DVT, dementia that presents to E D from nursing home for changes in mental status  Pt unable to provide any history secondary to changes in mental status  Pts daughter at bedside  Pt said at his baseline with dementia but still able to interact some and expressive himself  Today he is very confused with only noted to be yelling 'hello' at intervals  Pts daughter says it is not usual for him to say this but the screaming is new  He would not follow commands or answer questions     He apparently had not been feeling good since Tuesday and was diagnosed with UTI initially but next day he developed some cough and shortness of breath and CXR done in NH and was noted to have PNA  Pt has been on Levaquin for about 3 days  Daughter says he appears initially to have responded to treatment but was called today that father had a change in his mental status  He has had fever with temp of 100 6 in the nursing facility, he has cough, shortness of breath and was wheezing  He received some breathing treatments  He has 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  In the E D, he was afebrile but noted hypotension with lactic acidosis of 3 9   His urinary showed evidence of UTI and his CXR suggestive of possible RLL pna      Daughter states no dysphagia but noted that sometimes he does choke on thin liquids    ED Vital Signs:   ED Triage Vitals   Temperature Pulse Respirations Blood Pressure SpO2   01/26/18 2158 01/26/18 2158 01/26/18 2158 01/26/18 2158 01/26/18 2158   98 2 °F (36 8 °C) (!) 110 20 (!) 88/54 94 %      Temp Source Heart Rate Source Patient Position - Orthostatic VS BP Location FiO2 (%)   01/26/18 2158 01/26/18 2158 01/26/18 2158 01/26/18 2158 --   Tympanic Monitor Lying Right arm       Pain Score       01/27/18 0059       3        Wt Readings from Last 1 Encounters:   01/27/18 104 kg (229 lb 11 5 oz)     O2 2L N/C    Vital Signs (abnormal):   Date/Time  Temp  Pulse  Resp  BP  MAP (mmHg)  SpO2  O2 Device  Patient Position - Orthostatic VS   01/27/18 0830  --  77  20  121/58  --  92 %  --  --       01/27/18 0345  --  --  --   89/53  --  --  --  --   01/27/18 0342  --  --  --   83/44  --  --  --  --   01/27/18 0256  98 3 °F (36 8 °C)  86  22  95/53  --  93 %  Nasal cannula  Lying   01/27/18 0115  --   107   26   86/51  --  95 %  Nasal cannula  Sitting   01/27/18 0100  --  105   26   89/53  --  94 %  Nasal cannula  Sitting   01/27/18 0030  --  105   26  103/60  --           Abnormal Labs:    01/26/18 2215 Sodium 136 - 145 mmol/L 149     Potassium 3 5 - 5 3 mmol/L 3 3     Chloride 100 - 108 mmol/L 110     CO2 21 - 32 mmol/L 25    Anion Gap 4 - 13 mmol/L 14     BUN 5 - 25 mg/dL 43     Creatinine 0 60 - 1 30 mg/dL 3 11     Comments: Standardized to IDMS reference method   Glucose 65 - 140 mg/dL 153        01/27/18 0056    LACTIC ACID 0 5 - 2 0 mmol/L 4 6             Diagnostic Test Results: CXR - pending    ED Treatment:   Medication Administration from 01/26/2018 2149 to 01/27/2018 0256       Date/Time Order Dose Route Action     01/26/2018 2314  EMS REPLENISHMENT MED 0  Does not apply Given to EMS     01/26/2018 2239 levofloxacin (LEVAQUIN) IVPB (premix) 500 mg 500 mg Intravenous New Bag     01/26/2018 2236 sodium chloride 0 9 % bolus 1,000 mL 1,000 mL Intravenous New Bag     01/26/2018 2322 sodium chloride 0 9 % bolus 1,000 mL 1,000 mL Intravenous New Bag     01/27/2018 0015 potassium chloride 20 mEq IVPB (premix) 20 mEq Intravenous New Bag     01/27/2018 0022 sodium chloride 0 9 % infusion 125 mL/hr Intravenous New Bag     01/27/2018 0027 acetaminophen (TYLENOL) rectal suppository 650 mg 650 mg Rectal Given     01/27/2018 0148 morphine injection 2 mg 2 mg Intravenous Given          Past Medical/Surgical History:    Active Ambulatory Problems     Diagnosis Date Noted    No Active Ambulatory Problems     Resolved Ambulatory Problems     Diagnosis Date Noted    No Resolved Ambulatory Problems     No Additional Past Medical History       Admitting Diagnosis: Pneumonia [J18 9]  Fever [R50 9]  Acute UTI [N39 0]  Severe sepsis ([address] ) [A41 9, R65 20]  UTI (urinary tract infection) due to urinary indwelling catheter ([facility] [address] ) [H73 943Y, N39 0]    Age/Sex: [de-identified] y o  male    Assessment:  [de-identified] yr old male with history of afib, htn, dm, hyperlipidemia, CKD, urinary retention with indwelling suprapubic catheter, presents with changes in mental status and fever in nursing home on current treatment with antibiotics for pna and UTI and found to be hypotension with lactic acidosis in E D     - Altered mental status, likely secondary to toxic metabolic encephalopathy  -Severe sepsis secondary to combination of UTI and PNA r/o inf viral infection  - VIVIAN on CKD(unknown baseline- but last documentation from NH listed BMP as wnl), suspect combination of volume depletion vs ATN  -Lactic acidosis secondary to sepsis  - UTI in setting of chronic indwelling catheter for urinary retention  -afib with RVR on xarelto  - history of PE and DVT on AC with xarelto  -Hypernatremia secondary to volume depletion  -Dementia  - Hyperlipidemia  -DNR/DNI     Plan:  - Pt initially responded to IV fluids in the ED after 2L bolus, but BP still in upper 80s  I will give another liter of fluids, if no improvement will initiate on vasopressors to keep SPB>90 and MAP>60  -Daughter initially reluctant to have central line placed but now agreeable, central been placed  Daughter indicated that father does not want to prolong life with long duration of antibiotics   She wants to see in current mgt will should improvement, if not she may want to readdress this  - Will continue with aggressive fluid resuscitation   -Hold his antihypertensive meds and diuretics   -Continue to obtain serial lactic acid levels  - Will place empirically on broad spectrum antibiotics with cefepime, vancomycin and flagyl  - Suspect PNA be due to aspiration  - Will keep NPO and get speech evaluation when mental status improves  - Follow up BMP in Am, if no improvement in renal function with hydration may consider renal evaluation  - May need suprapubic catheter changed, will get urology evaluation    -Since NPO will change his Xarelto to IV heparin giving his low GFR  -Follow up blood and urine culture sent  - Flu screen also sent, will place on droplet precautions     Code Status: DNR/DNI      Admission Orders:  NPO  Tele monitoring  FL barium swallow video w speech  Bld culture x2  Infectious Disease cons  Urology cons    Scheduled Meds:   cefepime 500 mg Intravenous Q12H   insulin lispro 1-6 Units Subcutaneous Q6H   metroNIDAZOLE 500 mg Intravenous Q8H   oseltamivir 30 mg Oral Q24H   potassium chloride 20 mEq Intravenous Q2H   valproate sodium 250 mg Intravenous Q12H   vancomycin 12 5 mg/kg (Adjusted) Intravenous Q24H     Continuous Infusions:   dextrose 5 % and sodium chloride 0 45 % with KCl 40 mEq/L 125 mL/hr Last Rate: 125 mL/hr (01/27/18 1103)   heparin (porcine) 3-20 Units/kg/hr (Order-Specific) Last Rate: 11 1 Units/kg/hr (01/27/18 0604)     PRN Meds:     acetaminophen    heparin (porcine)    LORazepam    ondansetron

## 2018-01-27 NOTE — SEPSIS NOTE
Sepsis Note   John Maloney [de-identified] y o  male MRN: [id number]  Unit/Bed#: -02 Encounter: [id number]            Initial Sepsis Screening     Row Name 01/27/18 0006                Is the patient's history suggestive of a new or worsening infection? (!)  Yes (Proceed)  -ZULY        Suspected source of infection pneumonia;urinary tract infection  -ZULY        Are two or more of the following signs & symptoms of infection both present and new to the patient?         Indicate SIRS criteria Tachycardia > 90 bpm;Tachypnea > 20 resp per min  -Rocael Lehman        If the answer is yes to both questions, suspicion of sepsis is present          If severe sepsis is present AND tissue hypoperfusion perists in the hour after fluid resuscitation or lactate > 4, the patient meets criteria for SEPTIC SHOCK          Are any of the following organ dysfunction criteria present within 6 hours of suspected infection and SIRS criteria that are NOT considered to be chronic conditions? (!)  Yes  -ZULY        Organ dysfunction Lactate > 2 0 mmol/L  -ZULY        Date of presentation of severe sepsis 01/27/18  -ZULY        Time of presentation of severe sepsis 0009  -ZULY        Tissue hypoperfusion persists in the hour after crystalloid fluid administration, evidenced, by either: * OR * Lactate level is greater to or equal 4 mmol/dL ( ___ mmol/dL in comment field)  -Rocael Lehman        Was hypotension present within one hour of the conclusion of crystalloid fluid administration?  Yes  -ZULY        Date of presentation of septic shock 01/27/18  -ZULY        Time of presentation of septic shock 0405  -ZULY          User Key  (r) = Recorded By, (t) = Taken By, (c) = Cosigned By    [address] Name Provider Type    [address], DO Physician

## 2018-01-27 NOTE — H&P
H&P Exam - Bhavya Thapa [de-identified] y o  male MRN: [id number]    Unit/Bed#: ED 08 Encounter: [id number]        History of Present Illness     HPI:  Bhavya Thapa is a [de-identified] y o  male with pmhx of diabetes type 2, hypertension, AAA s/p repair, hyperlipidemia, afib, s/p PPM, CKD with unknown baseline, history of PE and DVT, dementia that presents to E D from nursing home for changes in mental status  Pt unable to provide any history secondary to changes in mental status  Pts daughter at bedside  Pt said at his baseline with dementia but still able to interact some and expressive himself  Today he is very confused with only noted to be yelling 'hello' at intervals  Pts daughter says it is not usual for him to say this but the screaming is new  He would not follow commands or answer questions  He apparently had not been feeling good since Tuesday and was diagnosed with UTI initially but next day he developed some cough and shortness of breath and CXR done in NH and was noted to have PNA  Pt has been on Levaquin for about 3 days  Daughter says he appears initially to have responded to treatment but was called today that father had a change in his mental status  He has had fever with temp of 100 6 in the nursing facility, he has cough, shortness of breath and was wheezing  He received some breathing treatments  He has 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  In the E D, he was afebrile but noted hypotension with lactic acidosis of 3 9  His urinary showed evidence of UTI and his CXR suggestive of possible RLL pna  Daughter states no dysphagia but noted that sometimes he does choke on thin liquids  Historical Information   No past medical history on file    Patient Active Problem List   Diagnosis    Fever    Sepsis ([facility] [address] )    UTI (urinary tract infection) due to urinary indwelling catheter (HCC)    Hypotension    Pneumonia due to infectious organism     No past surgical history on file     Social History  Does not smoke or take alcohol  Currently at a nursing facility  Daughter indicated pt is DNR/DNI and would not was any unnecessary life prolonging measures  If he has to be on antibiotics, only want for a short period if prognosis good  History   Alcohol use Not on file     History   Drug use: Unknown     History   Smoking Status    Not on file   Smokeless Tobacco    Not on file       Family History: No family history on file      Meds/Allergies       Current Facility-Administered Medications:     oseltamivir (TAMIFLU) oral suspension 30 mg, 30 mg, Oral, Q24H, Kianna Hicks MD    potassium chloride 20 mEq IVPB (premix), 20 mEq, Intravenous, Once, Francheska Arana DO, Last Rate: 50 mL/hr at 01/27/18 0015, 20 mEq at 01/27/18 0015    sodium chloride 0 9 % infusion, 125 mL/hr, Intravenous, Continuous, Francheska Arana, DO, Last Rate: 125 mL/hr at 01/27/18 0022, 125 mL/hr at 01/27/18 0022    Current Outpatient Prescriptions:     acetaminophen (TYLENOL) 325 mg tablet, Take 650 mg by mouth every 6 (six) hours as needed for mild pain, Disp: , Rfl:     atorvastatin (LIPITOR) 10 mg tablet, Take 10 mg by mouth daily, Disp: , Rfl:     cholecalciferol (VITAMIN D3) 1,000 units tablet, Take 1,000 Units by mouth daily, Disp: , Rfl:     diltiazem (DILACOR XR) 120 MG 24 hr capsule, Take 120 mg by mouth daily, Disp: , Rfl:     divalproex sodium (DEPAKOTE) 250 mg EC tablet, Take 250 mg by mouth 2 (two) times a day, Disp: , Rfl:     ferrous sulfate 325 (65 Fe) mg tablet, Take 325 mg by mouth daily with breakfast, Disp: , Rfl:     furosemide (LASIX) 80 mg tablet, Take 80 mg by mouth 2 (two) times a day, Disp: , Rfl:     hydrALAZINE (APRESOLINE) 50 mg tablet, Take 50 mg by mouth 3 (three) times a day, Disp: , Rfl:     lisinopril (ZESTRIL) 40 mg tablet, Take 40 mg by mouth 2 (two) times a day, Disp: , Rfl:     LORazepam (ATIVAN) 0 5 mg tablet, Take 0 5 mg by mouth 3 (three) times a day, Disp: , Rfl:   magnesium hydroxide (MILK OF MAGNESIA) 400 mg/5 mL oral suspension, Take 30 mL by mouth daily as needed for constipation, Disp: , Rfl:     Melatonin 5 MG CAPS, Take 5 mg by mouth, Disp: , Rfl:     metoprolol succinate (TOPROL-XL) 100 mg 24 hr tablet, Take 100 mg by mouth daily, Disp: , Rfl:     potassium chloride (K-DUR,KLOR-CON) 20 mEq tablet, Take 20 mEq by mouth 2 (two) times a day, Disp: , Rfl:     risperiDONE (RisperDAL) 0 25 mg tablet, Take 0 25 mg by mouth 2 (two) times a day, Disp: , Rfl:     rivaroxaban (XARELTO) 20 mg tablet, Take 20 mg by mouth, Disp: , Rfl:     sertraline (ZOLOFT) 100 mg tablet, Take 50 mg by mouth daily, Disp: , Rfl:     No Known Allergies    Review of Systems  Unable to obtain secondary to change in mental status    Objective   Vitals: Blood pressure (!) 86/51, pulse (!) 107, temperature 98 2 °F (36 8 °C), temperature source Tympanic, resp  rate (!) 26, height 6' (1 829 m), weight 113 kg (250 lb), SpO2 95 %  Physical Exam   General- confused, would not follow commands, noted to be yelling out hello at intervals  Opens eyes spontaneously at times  HEENT: PERRLA, EOM could not be assessed, sclera anicteric, very dry mucous membranes with poor oral care, tongue mucosa very dry without lesions  Neck: supple, no JVD, lymphadenopathy, thyromegaly  Heart: Irregularly irregular, tachycardia, S1S2 present  No murmur, rub or gallop  Lungs; Poor inspiratory effort, reduced breath sounds with mild basal crepts  Abdomen: soft, non-tender, non-distended, NABS  No guarding or rebound  No peritoneal sound or mass  Has an indwelling suprapubic catheter in place, with site with mild erythema, but no purulent discharge  Extremities: no clubbing, cyanosis, or edema  + pedal pulse  Neurologic: Confused, noted to be moving all extremities independently  Skin: warm and dry   Chronic venous stasis changes in bilat lower extremities, stage 2 sacral ulcer    Lab Results:     Results from last 7 days  Lab Units 01/26/18  2218   WBC Thousand/uL 10 07   HEMOGLOBIN g/dL 15 2   HEMATOCRIT % 48 2   PLATELETS Thousands/uL 288       Results from last 7 days  Lab Units 01/26/18  2219   SODIUM mmol/L 149*   POTASSIUM mmol/L 3 3*   CHLORIDE mmol/L 110*   CO2 mmol/L 25   BUN mg/dL 43*   CREATININE mg/dL 3 11*   GLUCOSE RANDOM mg/dL 153*   CALCIUM mg/dL 8 4         Imaging:  XR chest portable   ED Interpretation by Carmen Santoyo DO (01/26 [id number])   Right lower infiltrate with atelectasis          EKG, Pathology, and Other Studies:  EKG reviewed, noted with afib with RVR at 114b/min    Assessment/Plan     Assessment:  [de-identified] yr old male with history of afib, htn, dm, hyperlipidemia, CKD, urinary retention with indwelling suprapubic catheter, presents with changes in mental status and fever in nursing home on current treatment with antibiotics for pna and UTI and found to be hypotension with lactic acidosis in E D    - Altered mental status, likely secondary to toxic metabolic encephalopathy  -Severe sepsis secondary to combination of UTI and PNA r/o inf viral infection  - VIVIAN on CKD(unknown baseline- but last documentation from NH listed BMP as wnl), suspect combination of volume depletion vs ATN  -Lactic acidosis secondary to sepsis  - UTI in setting of chronic indwelling catheter for urinary retention  -afib with RVR on xarelto  - history of PE and DVT on AC with xarelto  -Hypernatremia secondary to volume depletion  -Dementia  - Hyperlipidemia  -DNR/DNI    Plan:  - Pt initially responded to IV fluids in the ED after 2L bolus, but BP still in upper 80s  I will give another liter of fluids, if no improvement will initiate on vasopressors to keep SPB>90 and MAP>60  -Daughter initially reluctant to have central line placed but now agreeable, central been placed  Daughter indicated that father does not want to prolong life with long duration of antibiotics   She wants to see in current mgt will should improvement, if not she may want to readdress this  - Will continue with aggressive fluid resuscitation   -Hold his antihypertensive meds and diuretics   -Continue to obtain serial lactic acid levels  - Will place empirically on broad spectrum antibiotics with cefepime, vancomycin and flagyl  - Suspect PNA be due to aspiration  - Will keep NPO and get speech evaluation when mental status improves  - Follow up BMP in [place], if no improvement in renal function with hydration may consider renal evaluation  - May need suprapubic catheter changed, will get urology evaluation    -Since NPO will change his Xarelto to IV heparin giving his low GFR  -Follow up blood and urine culture sent  - Flu screen also sent, will place on droplet precautions    Code Status: DNR/DNI    Counseling / Coordination of Care  Total floor / unit time spent today 60 minutes  Greater than 50% of total time was spent with the patient and / or family counseling and / or coordination of care  Will require greater than 2 midnight stay      Portions of the record may have been created with voice recognition software  Occasional wrong word or "sound a like" substitutions may have occurred due to the inherent limitations of voice recognition software  Read the chart carefully and recognize, using context, where substitutions have occurred

## 2018-01-27 NOTE — PLAN OF CARE
Problem: SLP ADULT - SWALLOWING, IMPAIRED  Goal: Initial SLP swallow eval performed  Outcome: Completed Date Met: 01/27/18

## 2018-01-27 NOTE — CONSULTS
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  This has been present for quite some time and the patient has noted intermittent urinary tract infections due to need for indwelling Chu catheter and suprapubic catheter as associated symptoms  Need for Chu catheter drainage of the bladder and no other factors are identified as aggravating and alleviating factors, respectively  Previous therapies include management by a urologist in [place] with a previous plan for bladder outlet reduction surgery (this sounds like they recommended a TURP in conversations with the patient's daughter, Chidi Paul), and previous work-up includes outpatient evaluation in the outside urologist office  The patient has previously seen a urologist for this problem  The patient's daughter otherwise denies a history of urologic malignancy or malady in [facility]   The patient's daughter denies a family history of urologic malignancy or malady  Per a discussion with the patient's daughter it sounds like he has been in and out of rehabilitation and nursing home facilities with the plan for eventual placement and a long-term nursing home given poor functional status and history of urinary tract infection and multiple medical comorbidities  He is currently admitted to hospital with pneumonia and potential urinary tract infection  Per the daughter's report, he has had 4-5 urinary tract infections due to suprapubic catheter and Chu catheter drainage of the bladder   Upon the current admission his clinical picture could either be due to pneumonia or urinary tract infection or a combination of both  The daughter and I did discuss that in patients with Chu catheter drainage urinalysis will always be positive urine culture is guarantee to be positive with a difficult differentiation between clinical infection versus colonization of the urinary bladder  The previously recommended bladder outlet reduction surgery was canceled when the patient required surgery for an abdominal aortic aneurysm  His Chu catheter was last changed 3 weeks ago in the outpatient setting by his outside urologist   He has had a complication of some pressure necrosis of the area around his suprapubic catheter, a finding which is not uncommon in patients requiring long-term bladder decompression via Chu catheter  The patient himself is unable to participate in today's interaction, however I spoke to his daughter for approximately 25 minutes on the telephone earlier today to obtain pertinent history and Physical information  Past Medical, Past Surgical History:   Past medical history includes fever, sepsis due to urinary source and pneumonia, urinary tract infection, hypertension, chronic kidney disease, urinary retention with indwelling catheter, atrial fibrillation, hypertension, diabetes, hyperlipidemia, and poor functional status      Surgical history includes suprapubic catheter placement and abdominal aortic aneurysm repair    Medications, Allergies:     Current Facility-Administered Medications   Medication Dose Route Frequency    acetaminophen (TYLENOL) tablet 650 mg  650 mg Oral Q6H PRN    cefepime (MAXIPIME) 500 mg in sodium chloride 0 9 % 50 mL IVPB  500 mg Intravenous Q12H    dextrose 5 % and sodium chloride 0 45 % with KCl 40 mEq/L infusion (premix)  125 mL/hr Intravenous Continuous    heparin (porcine) 25,000 units in 250 mL infusion (premix)  3-20 Units/kg/hr (Order-Specific) Intravenous Titrated    heparin (porcine) injection 2,000 Units  2,000 Units Intravenous PRN    heparin (porcine) injection 4,000 Units  4,000 Units Intravenous PRN    insulin lispro (HumaLOG) 100 units/mL subcutaneous injection 1-6 Units  1-6 Units Subcutaneous Q6H    LORazepam (ATIVAN) 2 mg/mL injection 0 25 mg  0 25 mg Intravenous Q6H PRN    metroNIDAZOLE (FLAGYL) IVPB (premix) 500 mg  500 mg Intravenous Q8H    ondansetron (ZOFRAN) injection 4 mg  4 mg Intravenous Q6H PRN    oseltamivir (TAMIFLU) oral suspension 30 mg  30 mg Oral Q24H    potassium chloride 20 mEq IVPB (premix)  20 mEq Intravenous Q2H    valproate (DEPACON) 250 mg in sodium chloride 0 9 % 50 mL IVPB  250 mg Intravenous Q12H    vancomycin (VANCOCIN) IVPB (premix) 1,000 mg  12 5 mg/kg (Adjusted) Intravenous Q24H       Allergies:  No Known Allergies:    Social and Family History:   Social History:   Social History   Substance Use Topics    Smoking status: Former Smoker    Smokeless tobacco: Never Used    Alcohol use Not on file     History   Smoking Status    Former Smoker   Smokeless Tobacco    Never Used       Family History:  History reviewed  No pertinent family history :     Review of Systems:     General: Fever, chills, or night sweats: positive  Cardiac: Negative for chest pain  Pulmonary:  Patient coughing and producing sputum  Gastrointestinal: Abdominal pain negative  Nausea, vomiting, or diarrhea negative,  Genitourinary: See HPI above  Patient does not have hematuria  All other systems were queried and were negative except as listed above in HPI and here in the ROS  Physical Exam:   /58   Pulse 77   Temp 97 8 °F (36 6 °C) (Axillary)   Resp 20   Ht 6' (1 829 m)   Wt 104 kg (229 lb 11 5 oz)   SpO2 92%   BMI 31 16 kg/m² Temp (24hrs), Av 1 °F (36 7 °C), Min:97 8 °F (36 6 °C), Max:98 3 °F (36 8 °C)  current; Temperature: 97 8 °F (36 6 °C)  I/O last 24 hours:   In: -   Out: 60 [Urine:60]  General:  Patient is alert to examiner intermittently, he is otherwise with a decreased level of consciousness and dementia/delirium, he is repeatedly yelling out help!     Cardiac: Peripheral edema: positive, peripheral pulses are present and indicated a irregular rate and rhythm    Pulmonary:  Some labored breathing, coughing intermittently with a wet cough productive of sputum    Abdomen: Soft, non-tender, non-distended  there are no discrete masses, no tenderness, no hernias, the insertion site of the suprapubic catheter is slightly erythematous with some pressure necrosis to the 6:00 position of the insertion site extending approximately 1/2 centimeters towards the right lower quadrant  Genitourinary: Negative CVA tenderness, negative suprapubic tenderness  (Male): Penis uncircumcised, phallus is somewhat buried, meatus patent  Testicles descended into scrotum bilaterally without masses nor tenderness  No inguinal hernias bilaterally  KG:  Patient is not cooperative with attempts at digital rectal examination and grabs at the examiner    Neurological: Cranial nerves II-XII are intact, no sensory deficits, no gross neurological deficits although he is demented and delirious at this time    Musculoskeletal: Extremities are warm and symmetrical, ROM is limited    Psychiatric: The patient's train of thought is Unable to be assessed given dimension delirium, mood and affect are abnormal and Delirious     Lymphatic: There is not adenopathy in the inguinal region      Skin:  Pale, warm, dry    LUTHER: in place draining clear yellow urine  no clots      Labs:     Lab Results   Component Value Date    HGB 12 0 01/27/2018    HCT 39 4 01/27/2018    WBC 8 25 01/27/2018     01/27/2018   ]    Lab Results   Component Value Date     (H) 01/27/2018    K 3 1 (L) 01/27/2018     (H) 01/27/2018    CO2 23 01/27/2018    BUN 42 (H) 01/27/2018    CREATININE 2 73 (H) 01/27/2018    CALCIUM 7 2 (L) 01/27/2018    GLUCOSE 134 01/27/2018 ]    Imaging:   I personally reviewed the images and report of the following studies, and reviewed them with the patient:    There is no cross-sectional imaging for my review, chest x-ray was reviewed, barium swallow has been ordered      ASSESSMENT:     [de-identified] y o  old male with  multiple medical problems including hyponatremia, dementia and delirium, positive urine culture in the setting of need for indwelling suprapubic Chu catheter due to urinary retention, and pneumonia  I had a long discussion with the patient's daughter regarding goals of care  It sounds like he is DNR DNI but does want to remain on Xarelto and blood pressure medications at this time  Quality of life from his perspective would mean only being able to interact with his family and visit with love ones    His daughter and I had a productive consultation today regarding his lower urinary tract symptoms and urinary retention  We discussed the anatomy of the prostate discussed the most likely cause of urinary retention as being caused by bladder outlet obstruction  We then discussed treatment of lower urinary tract symptoms in the form of medical therapy, behavioral changes and fluid modification to decrease symptoms, the use of minimally invasive therapies for bladder outlet obstruction such as the Urolift procedure, and the use of Transurethral resection of prostate and simple prostatectomy in patients with severe bladder outlet obstruction  It sounds like he has taken previous medical therapy and had a previous Chu catheter which was converted to a suprapubic catheter  The family changed her mind on a bladder outlet obstruction procedure given his need for abdominal aortic aneurysm repair previously  It is true that he has multiple medical comorbid conditions and he would likely not tolerate a more invasive procedure for bladder outlet obstruction    However,  the Urolift procedure can be a useful option in patients such as this   The mechanics of this operation and the pre, paula, and postoperative care were discussed with the patient's daughter and I discussed with her the exclusion criteria including a greater than 80 gram prostate and intravesical portion of the prostate  I made no urine tease that this procedure would be an option for him, but it may be the best option to get him suprapubic catheter free in the future should he recover from this acute bout of pneumonia and urinary tract infection  Finally, we reviewed the indications for more invasive therapies such as TURP and simple prostatectomy and again I stressed that he would likely not tolerate either of these operations  The role of transrectal ultrasound cystoscopy in determining which patients are candidates for your left was discussed with the patient's daughter and she will consider his options going forward should he recover  They do wish to change their urologic care to [address] [place] [address] and they live here in [facility]  PLAN:     Continue care per primary team    I just change the patient's suprapubic catheter to a fresh 18 German Chu catheter which is draining clear yellow urine without debris or blood clots  This will next be due for a change in 1 month in the outpatient setting  Going forward, please arrange for follow-up with Dr Lashanda Preciado at the [facility] office of the [facility] for Urology should this be desired by the patient and the patient's family  At that visit, further suprapubic catheter drainage of the bladder versus evaluation for potential performance of the Urolift procedure can be further discussed  Urology is signing off at this time      Thank you for allowing me to participate in this patients care  Please do not hesitate to call with any additional questions    Vianca Meyers MD

## 2018-01-27 NOTE — ED PROVIDER NOTES
History  Chief Complaint   Patient presents with    Altered Mental Status     Pt to ED from nursing home with altered mental status, currently being treated for pneumonia and UTI  Patient brought in by ambulance for worsening lethargy and confusion tonight  He has been taking antibiotic levaquin for pneumonia  He has a chronic indwellilng suprapubic valdes and dementia  His normal mental status is yelling out "hello" according to his daughter but now he is just moaning  Prior to Admission Medications   Prescriptions Last Dose Informant Patient Reported? Taking?    LORazepam (ATIVAN) 0 5 mg tablet   Yes Yes   Sig: Take 0 5 mg by mouth 3 (three) times a day   Melatonin 5 MG CAPS   Yes Yes   Sig: Take 5 mg by mouth   acetaminophen (TYLENOL) 325 mg tablet   Yes Yes   Sig: Take 650 mg by mouth every 6 (six) hours as needed for mild pain   atorvastatin (LIPITOR) 10 mg tablet   Yes Yes   Sig: Take 10 mg by mouth daily   cholecalciferol (VITAMIN D3) 1,000 units tablet   Yes Yes   Sig: Take 1,000 Units by mouth daily   diltiazem (DILACOR XR) 120 MG 24 hr capsule   Yes Yes   Sig: Take 120 mg by mouth daily   divalproex sodium (DEPAKOTE) 250 mg EC tablet   Yes Yes   Sig: Take 250 mg by mouth 2 (two) times a day   ferrous sulfate 325 (65 Fe) mg tablet   Yes Yes   Sig: Take 325 mg by mouth daily with breakfast   furosemide (LASIX) 80 mg tablet   Yes Yes   Sig: Take 80 mg by mouth 2 (two) times a day   hydrALAZINE (APRESOLINE) 50 mg tablet   Yes Yes   Sig: Take 50 mg by mouth 3 (three) times a day   lisinopril (ZESTRIL) 40 mg tablet   Yes Yes   Sig: Take 40 mg by mouth 2 (two) times a day   magnesium hydroxide (MILK OF MAGNESIA) 400 mg/5 mL oral suspension   Yes Yes   Sig: Take 30 mL by mouth daily as needed for constipation   metoprolol succinate (TOPROL-XL) 100 mg 24 hr tablet   Yes Yes   Sig: Take 100 mg by mouth daily   potassium chloride (K-DUR,KLOR-CON) 20 mEq tablet   Yes Yes   Sig: Take 20 mEq by mouth 2 (two) times a day   risperiDONE (RisperDAL) 0 25 mg tablet   Yes Yes   Sig: Take 0 25 mg by mouth 2 (two) times a day   rivaroxaban (XARELTO) 20 mg tablet   Yes Yes   Sig: Take 20 mg by mouth   sertraline (ZOLOFT) 100 mg tablet   Yes Yes   Sig: Take 50 mg by mouth daily      Facility-Administered Medications: None       History reviewed  No pertinent past medical history  History reviewed  No pertinent surgical history  History reviewed  No pertinent family history  I have reviewed and agree with the history as documented  Social History   Substance Use Topics    Smoking status: Former Smoker    Smokeless tobacco: Never Used    Alcohol use Not on file        Review of Systems   Unable to perform ROS: Dementia       Physical Exam  ED Triage Vitals   Temperature Pulse Respirations Blood Pressure SpO2   01/26/18 2158 01/26/18 2158 01/26/18 2158 01/26/18 2158 01/26/18 2158   98 2 °F (36 8 °C) (!) 110 20 (!) 88/54 94 %      Temp Source Heart Rate Source Patient Position - Orthostatic VS BP Location FiO2 (%)   01/26/18 2158 01/26/18 2158 01/26/18 2158 01/26/18 2158 --   Tympanic Monitor Lying Right arm       Pain Score       01/27/18 0059       3           Orthostatic Vital Signs  Vitals:    01/27/18 0445 01/27/18 0500 01/27/18 0515 01/27/18 0530   BP: 101/54 105/52 (!) 83/46 92/52   Pulse: 79 79 77 75   Patient Position - Orthostatic VS:           Physical Exam   Constitutional: He appears lethargic  He has a sickly appearance  He appears distressed  HENT:   Mouth/Throat: Mucous membranes are dry  Eyes: Conjunctivae and EOM are normal  Pupils are equal, round, and reactive to light  Neck: Normal range of motion  Neck supple  Cardiovascular: Tachycardia present  Exam reveals no gallop and no friction rub  No murmur heard  Pulmonary/Chest: Tachypnea noted  He has rhonchi  Abdominal: Soft  Bowel sounds are normal  He exhibits no distension  There is no tenderness     Genitourinary: Rectal exam shows guaiac negative stool  Musculoskeletal:   Generalized weakness   Neurological: He appears lethargic  GCS eye subscore is 3  GCS verbal subscore is 2  GCS motor subscore is 6  Skin: Skin is warm  He is diaphoretic  Buttock with stage 1 decubitus bilateral   Nursing note and vitals reviewed        ED Medications  Medications   acetaminophen (TYLENOL) tablet 650 mg (not administered)   ondansetron (ZOFRAN) injection 4 mg (not administered)   cefepime (MAXIPIME) 500 mg in sodium chloride 0 9 % 50 mL IVPB (500 mg Intravenous New Bag 1/27/18 0646)   vancomycin (VANCOCIN) IVPB (premix) 1,000 mg (1,000 mg Intravenous New Bag 1/27/18 0435)   oseltamivir (TAMIFLU) oral suspension 30 mg (not administered)   valproate (DEPACON) 250 mg in sodium chloride 0 9 % 50 mL IVPB (not administered)   LORazepam (ATIVAN) 2 mg/mL injection 0 25 mg (not administered)   insulin lispro (HumaLOG) 100 units/mL subcutaneous injection 1-6 Units (1 Units Subcutaneous Not Given 1/27/18 0449)   sodium chloride infusion 0 45 % (150 mL/hr Intravenous New Bag 1/27/18 0420)   metroNIDAZOLE (FLAGYL) IVPB (premix) 500 mg (500 mg Intravenous New Bag 1/27/18 0433)   heparin (porcine) 25,000 units in 250 mL infusion (premix) (11 1 Units/kg/hr × 90 kg (Order-Specific) Intravenous New Bag 1/27/18 0604)   heparin (porcine) injection 4,000 Units (not administered)   heparin (porcine) injection 2,000 Units (not administered)    EMS REPLENISHMENT MED ( Does not apply Given to EMS 1/26/18 2315)   levofloxacin (LEVAQUIN) IVPB (premix) 500 mg (0 mg Intravenous Stopped 1/26/18 2339)   sodium chloride 0 9 % bolus 1,000 mL (0 mL Intravenous Stopped 1/26/18 2306)     Followed by   sodium chloride 0 9 % bolus 1,000 mL (0 mL Intravenous Stopped 1/27/18 0026)   potassium chloride 20 mEq IVPB (premix) (0 mEq Intravenous Stopped 1/27/18 0215)   acetaminophen (TYLENOL) rectal suppository 650 mg (650 mg Rectal Given 1/27/18 0027)   morphine injection 2 mg (2 mg Intravenous Given 1/27/18 0148)       Diagnostic Studies  Results Reviewed     Procedure Component Value Units Date/Time    Lactic Acid x2 [[id number]]  (Abnormal) Collected:  01/27/18 0409    Lab Status:  Final result Specimen:  Blood from Line Updated:  01/27/18 0438     LACTIC ACID 2 5 (HH) mmol/L     Narrative:         Result may be elevated if tourniquet was used during collection  Lactic Acid x2 [[id number]]  (Abnormal) Collected:  01/27/18 0056    Lab Status:  Final result Specimen:  Blood from Arm, Right Updated:  01/27/18 0131     LACTIC ACID 4 6 (HH) mmol/L     Narrative:         Result may be elevated if tourniquet was used during collection  Influenza A/B and RSV by PCR (indicated for patients >2 mo of age) [[id number]] Collected:  01/27/18 0015    Lab Status: In process Specimen:  Nasopharyngeal from Nasopharyngeal Swab Updated:  01/27/18 0108    Urine Microscopic [[id number]]  (Abnormal) Collected:  01/26/18 2246    Lab Status:  Final result Specimen:  Urine from Urine, Suprapubic catheter Updated:  01/27/18 0001     RBC, UA Innumerable (A) /hpf      WBC, UA Innumerable (A) /hpf      Epithelial Cells  /hpf      Field obscured, unable to enumerate (A)     Bacteria, UA  /hpf      Field obscured, unable to enumerate (A)    Urine culture [[id number]] Collected:  01/26/18 2246    Lab Status:   In process Specimen:  Urine from Urine, Suprapubic catheter Updated:  01/27/18 0001    UA w Reflex to Microscopic w Reflex to Culture [[id number]]  (Abnormal) Collected:  01/26/18 2246    Lab Status:  Final result Specimen:  Urine from Urine, Suprapubic catheter Updated:  01/26/18 3147     Color, UA Red     Clarity, UA Turbid     Specific Gravity, UA 1 025     pH, UA 5 0     Leukocytes, UA Moderate (A)     Nitrite, UA Positive (A)     Protein, UA >=300 (A) mg/dl      Glucose, UA Negative mg/dl      Ketones, UA 15 (1+) (A) mg/dl      Urobilinogen, UA 1 0 E U /dl      Bilirubin, UA Interference- unable to analyze (A) Blood, UA Large (A)    Magnesium [[id number]]  (Normal) Collected:  01/26/18 2219    Lab Status:  Final result Specimen:  Blood from Hand, Right Updated:  01/26/18 2350     Magnesium 2 1 mg/dL     Lactic Acid x2 [[id number]]  (Abnormal) Collected:  01/26/18 2218    Lab Status:  Final result Specimen:  Blood from Hand, Right Updated:  01/26/18 2255     LACTIC ACID 3 9 (HH) mmol/L     Narrative:         Result may be elevated if tourniquet was used during collection  CBC and differential [[id number]]  (Abnormal) Collected:  01/26/18 2218    Lab Status:  Final result Specimen:  Blood from Hand, Right Updated:  01/26/18 2254     WBC 10 07 Thousand/uL      RBC 5 61 Million/uL      Hemoglobin 15 2 g/dL      Hematocrit 48 2 %      MCV 86 fL      MCH 27 1 pg      MCHC 31 5 g/dL      RDW 16 8 (H) %      MPV 10 4 fL      Platelets 419 Thousands/uL     Narrative: This is an appended report  These results have been appended to a previously verified report  Comprehensive metabolic panel [[id number]]  (Abnormal) Collected:  01/26/18 2219    Lab Status:  Final result Specimen:  Blood from Hand, Right Updated:  01/26/18 2252     Sodium 149 (H) mmol/L      Potassium 3 3 (L) mmol/L      Chloride 110 (H) mmol/L      CO2 25 mmol/L      Anion Gap 14 (H) mmol/L      BUN 43 (H) mg/dL      Creatinine 3 11 (H) mg/dL      Glucose 153 (H) mg/dL      Calcium 8 4 mg/dL      AST 74 (H) U/L      ALT 53 U/L      Alkaline Phosphatase 58 U/L      Total Protein 8 2 g/dL      Albumin 2 5 (L) g/dL      Total Bilirubin 0 30 mg/dL      eGFR 18 ml/min/1 73sq m     Narrative:         National Kidney Disease Education Program recommendations are as follows:  GFR calculation is accurate only with a steady state creatinine  Chronic Kidney disease less than 60 ml/min/1 73 sq  meters  Kidney failure less than 15 ml/min/1 73 sq  meters      APTT [[id number]]  (Abnormal) Collected:  01/26/18 2219    Lab Status:  Final result Specimen:  Blood from Arm, Right Updated:  01/26/18 2251     PTT 46 (H) seconds     Narrative: Therapeutic Heparin Range = 60-90 seconds    Protime-INR [[id number]]  (Abnormal) Collected:  01/26/18 2219    Lab Status:  Final result Specimen:  Blood from Arm, Right Updated:  01/26/18 2251     Protime 19 6 (H) seconds      INR 1 68 (H)    Blood culture #1 [[id number]] Collected:  01/26/18 2227    Lab Status: In process Specimen:  Blood from Arm, Left Updated:  01/26/18 2232    Blood culture #2 [[id number]] Collected:  01/26/18 2219    Lab Status:   In process Specimen:  Blood from Hand, Right Updated:  01/26/18 2232                 XR chest portable   ED Interpretation by Vidya Alamo DO (01/26 2343)   Right lower infiltrate with atelectasis      FL barium swallow video w speech    (Results Pending)              Procedures  ECG 12 Lead Documentation  Date/Time: 1/26/2018 10:20 PM  Performed by: Doc Dickens  Authorized by: Doc Dickens     Indications / Diagnosis:  Sepsis  ECG reviewed by me, the ED Provider: yes    Patient location:  ED  Previous ECG:     Previous ECG:  Unavailable  Interpretation:     Interpretation: abnormal    Rate:     ECG rate:  114    ECG rate assessment: tachycardic    Rhythm:     Rhythm: atrial fibrillation    Ectopy:     Ectopy: none    QRS:     QRS axis:  Left    QRS intervals:  Normal  Conduction:     Conduction: abnormal      Abnormal conduction: complete RBBB    ST segments:     ST segments:  Normal  T waves:     T waves: normal    Central Line  Date/Time: 1/27/2018 1:59 AM  Performed by: Sunny Greer by: Doc Dcikens     Patient location:  ED  Consent:     Consent obtained:  Written    Consent given by:  Guardian    Risks discussed:  Bleeding and infection    Alternatives discussed:  No treatment and delayed treatment  Universal protocol:     Procedure explained and questions answered to patient or proxy's satisfaction: yes      Test results available and properly labeled: yes      Imaging studies available: yes      Required blood products, implants, devices, and special equipment available: yes      Immediately prior to procedure, a time out was called: yes      Patient identity confirmed:  Arm band  Pre-procedure details:     Hand hygiene: Hand hygiene performed prior to insertion      Sterile barrier technique: All elements of maximal sterile technique followed      Skin preparation:  2% chlorhexidine    Skin preparation agent: Skin preparation agent completely dried prior to procedure    Indications:     Central line indications: medications requiring central line and hemodynamic monitoring    Anesthesia (see MAR for exact dosages): Anesthesia method:  Local infiltration    Local anesthetic:  Lidocaine 1% w/o epi  Procedure details:     Location:  Left femoral    Vessel type: vein      Laterality:  Left    Approach: percutaneous technique used      Patient position:  Flat    Catheter type:  Triple lumen 20cm    Catheter size:  7 Fr    Landmarks identified: yes      Ultrasound guidance: yes      Sterile ultrasound techniques: Sterile gel and sterile probe covers were used      Number of attempts:  3    Successful placement: yes      Vessel of catheter tip end:  20 cm  Post-procedure details:     Post-procedure:  Line sutured and dressing applied    Assessment:  Blood return through all ports and free fluid flow    Post-procedure complications: none      Patient tolerance of procedure:   Tolerated well, no immediate complications  CriticalCare Time  Performed by: Estiven Licea  Authorized by: Estiven Licea     Critical care provider statement:     Critical care time (minutes):  30    Critical care time was exclusive of:  Separately billable procedures and treating other patients and teaching time    Critical care was necessary to treat or prevent imminent or life-threatening deterioration of the following conditions:  Circulatory failure, dehydration, trauma and sepsis    Critical care was time spent personally by me on the following activities:  Obtaining history from patient or surrogate, development of treatment plan with patient or surrogate, discussions with consultants, evaluation of patient's response to treatment, examination of patient, review of old charts, re-evaluation of patient's condition, ordering and review of radiographic studies, ordering and review of laboratory studies and ordering and performing treatments and interventions           Phone Contacts  ED Phone Contact    ED Course  ED Course as of Jan 27 0648   Sat Jan 27, 2018   Ariane pérez with daughter  Patient is DNR DNI and she did not want a central line  Initial Sepsis Screening     Row Name 01/27/18 0006                Is the patient's history suggestive of a new or worsening infection? (!)  Yes (Proceed)  -ZULY        Suspected source of infection pneumonia;urinary tract infection  -ZULY        Are two or more of the following signs & symptoms of infection both present and new to the patient?         Indicate SIRS criteria Tachycardia > 90 bpm;Tachypnea > 20 resp per min  -Lucy Spain        If the answer is yes to both questions, suspicion of sepsis is present          If severe sepsis is present AND tissue hypoperfusion perists in the hour after fluid resuscitation or lactate > 4, the patient meets criteria for SEPTIC SHOCK          Are any of the following organ dysfunction criteria present within 6 hours of suspected infection and SIRS criteria that are NOT considered to be chronic conditions?  (!)  Yes  -ZULY        Organ dysfunction Lactate > 2 0 mmol/L  -ZULY        Date of presentation of severe sepsis 01/27/18  -ZULY        Time of presentation of severe sepsis 0009  -ZULY        Tissue hypoperfusion persists in the hour after crystalloid fluid administration, evidenced, by either: * OR * Lactate level is greater to or equal 4 mmol/dL ( ___ mmol/dL in comment field)  -Lucy Spain        Was hypotension present within one hour of the conclusion of crystalloid fluid administration?  Yes  -ZULY        Date of presentation of septic shock 01/27/18  -ZULY        Time of presentation of septic shock 0405  -ZULY          User Key  (r) = Recorded By, (t) = Taken By, (c) = Cosigned By    [address] Name Provider Type    Ricky Moore DO Physician           Default Flowsheet Data (last 720 hours)      Sepsis Reassessment     Row Name 01/27/18 0405                   Volume Status and Tissue Perfusion Post Fluid Resuscitation- Must Document ALL of the Following:    Vital Signs Reviewed Yes  -ZULY        Cardio Normal S1/S2  -ZULY        Pulmonary (!)  Rhonchi  -ZULY        Capillary Refill Sluggish  -ZULY        Peripheral Pulses          Skin Pale  -ZULY           *OR*   Intensive Monitoring- Must Document Two * of the Following Four *:    Vital Signs Reviewed Yes  -ZULY        * Central Venous Pressure (CVP or RAP)          * Central Venous Oxygen (SVO2, ScvO2 or Oxygen saturation via central catheter)          * Bedside Cardiovascular US in IVC diameter and % collapse          * Passive Leg Raise OR Crystalloid Challenge Crystalloid fluid challenge completed  -ZULY        Crystalloid fluid challenge completed 1000mL in 30 minutes  -ZULY          User Key  (r) = Recorded By, (t) = Taken By, (c) = Cosigned By    [address]  Name Provider Type    Ricky Moore DO Physician                MDM  Number of Diagnoses or Management Options  Acute UTI: new and requires workup  Pneumonia: new and requires workup  Severe sepsis [facility]): new and requires workup     Amount and/or Complexity of Data Reviewed  Clinical lab tests: reviewed  Tests in the radiology section of CPT®: ordered and reviewed  Discuss the patient with other providers: yes    Patient Progress  Patient progress: improved    The patient presented with a condition in which there was a high probability of imminent or life-threatening deterioration, and critical care services (excluding separately billable procedures) totalled 30-74 minutes  Disposition  Final diagnoses:   Severe sepsis ([facility] [address] )   Pneumonia - acute   Acute UTI     Time reflects when diagnosis was documented in both MDM as applicable and the Disposition within this note     Time User Action Codes Description Comment    1/27/2018 12:14 AM [email or web address] Add [A41 9,  R65 20] Severe sepsis ([facility] [address] )     1/27/2018 12:14 AM [facility] Add [J18 9] Pneumonia     1/27/2018 12:14 AM [facility] Modify [J18 9] Pneumonia acute    1/27/2018 12:15 AM [facility] Add [N39 0] Acute UTI     1/27/2018 12:41 AM Kianna Kumar Add [T83 511A,  N39 0] UTI (urinary tract infection) [facility]     1/27/2018 12:41 AM Kianna Kumar Modify [X91 629Y,  N39 0] UTI (urinary tract infection) due to urinary indwelling catheter [facility])       ED Disposition     ED Disposition Condition Comment    Admit  Case was discussed with Halina Bal** and the patient's admission status was agreed to be Admission Status: inpatient status to the service of Dr Yu Bazan           Follow-up Information    None       Current Discharge Medication List      CONTINUE these medications which have NOT CHANGED    Details   acetaminophen (TYLENOL) 325 mg tablet Take 650 mg by mouth every 6 (six) hours as needed for mild pain      atorvastatin (LIPITOR) 10 mg tablet Take 10 mg by mouth daily      cholecalciferol (VITAMIN D3) 1,000 units tablet Take 1,000 Units by mouth daily      diltiazem (DILACOR XR) 120 MG 24 hr capsule Take 120 mg by mouth daily      divalproex sodium (DEPAKOTE) 250 mg EC tablet Take 250 mg by mouth 2 (two) times a day      ferrous sulfate 325 (65 Fe) mg tablet Take 325 mg by mouth daily with breakfast      furosemide (LASIX) 80 mg tablet Take 80 mg by mouth 2 (two) times a day      hydrALAZINE (APRESOLINE) 50 mg tablet Take 50 mg by mouth 3 (three) times a day      lisinopril (ZESTRIL) 40 mg tablet Take 40 mg by mouth 2 (two) times a day      LORazepam (ATIVAN) 0 5 mg tablet Take 0 5 mg by mouth 3 (three) times a day      magnesium hydroxide (MILK OF MAGNESIA) 400 mg/5 mL oral suspension Take 30 mL by mouth daily as needed for constipation      Melatonin 5 MG CAPS Take 5 mg by mouth      metoprolol succinate (TOPROL-XL) 100 mg 24 hr tablet Take 100 mg by mouth daily      potassium chloride (K-DUR,KLOR-CON) 20 mEq tablet Take 20 mEq by mouth 2 (two) times a day      risperiDONE (RisperDAL) 0 25 mg tablet Take 0 25 mg by mouth 2 (two) times a day      rivaroxaban (XARELTO) 20 mg tablet Take 20 mg by mouth      sertraline (ZOLOFT) 100 mg tablet Take 50 mg by mouth daily           No discharge procedures on file      ED Provider  Electronically Signed by     Manish Barnes DO  01/27/18 0468

## 2018-01-27 NOTE — PROCEDURES
Indication for procedure:  Urinary tract infection, sepsis due to urinary source, history of suprapubic catheter    Overall impression:  Exchange of 18 Bengali suprapubic catheter at the bedside and aspiration of the bladder without complication    Procedural details:  Patient was seen and evaluated the bedside determination was made that suprapubic catheter exchange was necessary and appropriate  Suprapubic catheter was last changed 3 weeks ago and patient presents with sepsis and urinary tract infection    Patient was prepped and draped in the usual sterile fashion, the indwelling Chu catheter balloon was deflated, this was then removed and a fresh 18 Bengali latex suprapubic Chu catheter was placed into the lumen of the urinary bladder and inflated with 10 cubic centimeters of sterile water  A catheter tip syringe was then used to irrigate the bladder to check for debris and patency and proper placement of the catheter  The catheter irrigated well and without issue with sterile water and was connected to gravity drainage  Area was dressed secured to prevent removal by the patient  The patient tolerated the procedure well without issue all counts were correct before and after the procedure  The plan going forward will be to follow up on outpatient basis for further suprapubic catheter changes

## 2018-01-27 NOTE — SEPSIS NOTE
Sepsis Note   Mima Primrose [de-identified] y o  male MRN: [id number]  Unit/Bed#: -02 Encounter: [id number]            Initial Sepsis Screening     Row Name 01/27/18 0006                Is the patient's history suggestive of a new or worsening infection? (!)  Yes (Proceed)  -ZULY        Suspected source of infection pneumonia;urinary tract infection  -ZULY        Are two or more of the following signs & symptoms of infection both present and new to the patient?         Indicate SIRS criteria Tachycardia > 90 bpm;Tachypnea > 20 resp per min  -Minta Lick        If the answer is yes to both questions, suspicion of sepsis is present          If severe sepsis is present AND tissue hypoperfusion perists in the hour after fluid resuscitation or lactate > 4, the patient meets criteria for SEPTIC SHOCK          Are any of the following organ dysfunction criteria present within 6 hours of suspected infection and SIRS criteria that are NOT considered to be chronic conditions? (!)  Yes  -ZULY        Organ dysfunction Lactate > 2 0 mmol/L  -ZULY        Date of presentation of severe sepsis 01/27/18  -ZULY        Time of presentation of severe sepsis 0009  -ZULY        Tissue hypoperfusion persists in the hour after crystalloid fluid administration, evidenced, by either: * OR * Lactate level is greater to or equal 4 mmol/dL ( ___ mmol/dL in comment field)  -Minta Lick        Was hypotension present within one hour of the conclusion of crystalloid fluid administration?  Yes  -ZULY        Date of presentation of septic shock 01/27/18  -ZULY        Time of presentation of septic shock 0405  -[address]  (r) = Recorded By, (t) = Taken By, (c) = Cosigned By    [address] Name Provider Type    [address], DO Physician               Default Flowsheet Data (last 720 hours)      Sepsis Reassessment     Row Name 01/27/18 0405                   Volume Status and Tissue Perfusion Post Fluid Resuscitation- Must Document ALL of the Following:    Vital Signs Reviewed Yes  -ZULY        Cardio Normal S1/S2  -ZULY        Pulmonary (!)  Rhonchi  -ZULY        Capillary Refill Sluggish  -ZULY        Peripheral Pulses          Skin Pale  -ZULY           *OR*   Intensive Monitoring- Must Document Two * of the Following Four *:    Vital Signs Reviewed Yes  -ZULY        * Central Venous Pressure (CVP or RAP)          * Central Venous Oxygen (SVO2, ScvO2 or Oxygen saturation via central catheter)          * Bedside Cardiovascular US in IVC diameter and % collapse          * Passive Leg Raise OR Crystalloid Challenge Crystalloid fluid challenge completed  -ZULY        Crystalloid fluid challenge completed 1000mL in 30 minutes  -ZULY          User Key  (r) = Recorded By, (t) = Taken By, (c) = Cosigned By    Initials Name Provider Type    [address], DO Physician

## 2018-01-27 NOTE — SPEECH THERAPY NOTE
Speech-Language Pathology Bedside Swallow Evaluation    Patient Name: Francis Robles    [id number] Date: 1/27/2018     Problem List  Patient Active Problem List   Diagnosis    Fever    Sepsis ([facility] )    UTI (urinary tract infection) due to urinary indwelling catheter ([facility] )    Hypotension    Pneumonia due to infectious organism       Past Medical History  History reviewed  No pertinent past medical history  Past Surgical History  History reviewed  No pertinent surgical history  Current Medical Status  Pt is a [de-identified] y o  male who [facility] home for changes in mental status  Pt unable to provide any history secondary to changes in mental status  Pts daughter at bedside  Pt said at his baseline with dementia but still able to interact some and expressive himself  Today he is very confused with only noted to be yelling 'hello' at intervals  Pts daughter says it is not usual for him to say this but the screaming is new  He would not follow commands or answer questions  He apparently had not been feeling good since Tuesday and was diagnosed with UTI initially but next day he developed some cough and shortness of breath and CXR done in NH and was noted to have PNA  Pt has been on Levaquin for about 3 days  Daughter says he appears initially to have responded to treatment but was called today that father had a change in his mental status  He has had fever with temp of 100 6 in the nursing facility, he has cough, shortness of breath and was wheezing  He received some breathing treatments  He has 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  In the E D, he was afebrile but noted hypotension with lactic acidosis of 3 9  His urinary showed evidence of UTI and his CXR suggestive of possible RLL pna  Daughter states no dysphagia but noted that sometimes he does choke on thin liquids   Patient made NPO and swallowing evaluation orders placed and evaluation completed bedside  Past medical history:  dementia  Please see H&P for details    Special Studies:  CXR 1/26/18: Right lower infiltrate with atelectasis    Social/Education/Vocational Hx:  Pt lives in [address] [place] [address] Way Information   Current Risks for Dysphagia & Aspiration: AMS      Current Symptoms/Concerns: change in respiratory status    Current Diet: NPO      Baseline Diet: regular diet and thin liquids      Baseline Assessment   Behavior/Cognition: lethargic and low alertness level Patient inconsistently awake throughout the evaluation requiring verbal and tactile cues  Speech/Language Status: able to follow commands inconsistently and limited verbal output    Patient Positioning: upright in bed       Swallow Mechanism Exam   Facial: symmetrical  Labial: WFL  Lingual: WFL  Velum: unable to visualize  Mandible: unable to test 2/2 limited command following  Dentition: edentulous and poor oral hygiene  Vocal quality:clear/adequate with minimal verbal output   Volitional Cough: unable to initiate volitional cough   Respiratory: wheezing at rest, on 02 via NC with 02 fluctuating in 90's throughout evaluation  Patient with dry mucosa and oral care completed  Patient noted to have pocketing of ? food residue in buccal cavity, expectorated during oral care  Consistencies Assessed and Performance   Consistencies Administered: puree  Specific materials administered included applesauce    Oral Stage: moderate-severe, decreased bolus acceptance and delayed oral intiation    Patient fed tsps of puree with reduced oral acceptance and reduced labial seal to spoon  Placed minimal amts of puree on anterior portion of tongue  Patient with attempted manipulation and prolonged holding  Patient with improved acceptance of additional trial with manipulation and poor swallow initiation despite max cues  Oral cavity cleaned with swab upon completion of examination      Pharyngeal Stage: suspect moderate-severe    Patient with poor swallow initiation and absent/weak HLE when palpated with limited trials and with dry swallow during evaluation  Patient provided with max cues to initiate swallow and unable to follow commands  Patient cued to cough or throat clear following trials and unable to initiate  Esophageal Concerns: none reported    Summary   Patient with moderate-severe oral and suspected moderate-severe pharyngeal dysphagia due to poor oral acceptance of po items, reduced swallow initiation, and absent/weak HLE with dry swallow and po items  Patient remains at risk for aspiration due to current oropharyngeal swallowing, reduced MARISSA, and current respiratory status  Recommendations: NPO     Recommended Form of Meds: non-oral if possible     Results Reviewed with: RN     Treatment Recommendations: Will continue to follow and re-assess as appropriate  Dysphagia Goals: Patient will tolerate least restrictive diet with no overt s/s aspiration or s/s dysphagia

## 2018-01-28 ENCOUNTER — APPOINTMENT (INPATIENT)
Dept: RADIOLOGY | Facility: HOSPITAL | Age: 83
DRG: 698 | End: 2018-01-28
Payer: MEDICARE

## 2018-01-28 PROBLEM — Z86.711 HISTORY OF PULMONARY EMBOLUS (PE): Status: ACTIVE | Noted: 2018-01-28

## 2018-01-28 LAB
ALBUMIN SERPL BCP-MCNC: 1.6 G/DL (ref 3.5–5)
ALP SERPL-CCNC: 39 U/L (ref 46–116)
ALT SERPL W P-5'-P-CCNC: 28 U/L (ref 12–78)
ANION GAP SERPL CALCULATED.3IONS-SCNC: 9 MMOL/L (ref 4–13)
ANION GAP SERPL CALCULATED.3IONS-SCNC: 9 MMOL/L (ref 4–13)
ANISOCYTOSIS BLD QL SMEAR: PRESENT
APTT PPP: 76 SECONDS (ref 23–35)
APTT PPP: 78 SECONDS (ref 23–35)
AST SERPL W P-5'-P-CCNC: 47 U/L (ref 5–45)
ATRIAL RATE: 129 BPM
BASOPHILS # BLD MANUAL: 0 THOUSAND/UL (ref 0–0.1)
BASOPHILS NFR MAR MANUAL: 0 % (ref 0–1)
BILIRUB SERPL-MCNC: 0.2 MG/DL (ref 0.2–1)
BUN SERPL-MCNC: 28 MG/DL (ref 5–25)
BUN SERPL-MCNC: 32 MG/DL (ref 5–25)
BURR CELLS BLD QL SMEAR: PRESENT
CALCIUM SERPL-MCNC: 7.2 MG/DL (ref 8.3–10.1)
CALCIUM SERPL-MCNC: 7.3 MG/DL (ref 8.3–10.1)
CHLORIDE SERPL-SCNC: 117 MMOL/L (ref 100–108)
CHLORIDE SERPL-SCNC: 118 MMOL/L (ref 100–108)
CO2 SERPL-SCNC: 22 MMOL/L (ref 21–32)
CO2 SERPL-SCNC: 23 MMOL/L (ref 21–32)
CREAT SERPL-MCNC: 1.56 MG/DL (ref 0.6–1.3)
CREAT SERPL-MCNC: 1.71 MG/DL (ref 0.6–1.3)
EOSINOPHIL # BLD MANUAL: 0 THOUSAND/UL (ref 0–0.4)
EOSINOPHIL NFR BLD MANUAL: 0 % (ref 0–6)
ERYTHROCYTE [DISTWIDTH] IN BLOOD BY AUTOMATED COUNT: 16.4 % (ref 11.6–15.1)
GFR SERPL CREATININE-BSD FRML MDRD: 37 ML/MIN/1.73SQ M
GFR SERPL CREATININE-BSD FRML MDRD: 41 ML/MIN/1.73SQ M
GLUCOSE SERPL-MCNC: 101 MG/DL (ref 65–140)
GLUCOSE SERPL-MCNC: 103 MG/DL (ref 65–140)
GLUCOSE SERPL-MCNC: 126 MG/DL (ref 65–140)
GLUCOSE SERPL-MCNC: 140 MG/DL (ref 65–140)
GLUCOSE SERPL-MCNC: 77 MG/DL (ref 65–140)
GLUCOSE SERPL-MCNC: 93 MG/DL (ref 65–140)
HCT VFR BLD AUTO: 34.9 % (ref 36.5–49.3)
HGB BLD-MCNC: 10.8 G/DL (ref 12–17)
LYMPHOCYTES # BLD AUTO: 1.51 THOUSAND/UL (ref 0.6–4.47)
LYMPHOCYTES # BLD AUTO: 19 % (ref 14–44)
MAGNESIUM SERPL-MCNC: 1.7 MG/DL (ref 1.6–2.6)
MCH RBC QN AUTO: 27.1 PG (ref 26.8–34.3)
MCHC RBC AUTO-ENTMCNC: 30.9 G/DL (ref 31.4–37.4)
MCV RBC AUTO: 88 FL (ref 82–98)
MICROCYTES BLD QL AUTO: PRESENT
MONOCYTES # BLD AUTO: 0.08 THOUSAND/UL (ref 0–1.22)
MONOCYTES NFR BLD: 1 % (ref 4–12)
MRSA NOSE QL CULT: ABNORMAL
MYELOCYTES NFR BLD MANUAL: 1 % (ref 0–1)
NEUTROPHILS # BLD MANUAL: 6.22 THOUSAND/UL (ref 1.85–7.62)
NEUTS BAND NFR BLD MANUAL: 11 % (ref 0–8)
NEUTS SEG NFR BLD AUTO: 67 % (ref 43–75)
PHOSPHATE SERPL-MCNC: 2 MG/DL (ref 2.3–4.1)
PLATELET # BLD AUTO: 200 THOUSANDS/UL (ref 149–390)
PLATELET BLD QL SMEAR: ADEQUATE
PMV BLD AUTO: 10.2 FL (ref 8.9–12.7)
POIKILOCYTOSIS BLD QL SMEAR: PRESENT
POTASSIUM SERPL-SCNC: 3.3 MMOL/L (ref 3.5–5.3)
POTASSIUM SERPL-SCNC: 3.8 MMOL/L (ref 3.5–5.3)
PROT SERPL-MCNC: 5.8 G/DL (ref 6.4–8.2)
QRS AXIS: -43 DEGREES
QRSD INTERVAL: 126 MS
QT INTERVAL: 376 MS
QTC INTERVAL: 518 MS
RBC # BLD AUTO: 3.98 MILLION/UL (ref 3.88–5.62)
RBC MORPH BLD: PRESENT
SODIUM SERPL-SCNC: 149 MMOL/L (ref 136–145)
SODIUM SERPL-SCNC: 149 MMOL/L (ref 136–145)
T WAVE AXIS: 45 DEGREES
TOTAL CELLS COUNTED SPEC: 100
VARIANT LYMPHS # BLD AUTO: 1 %
VENTRICULAR RATE: 114 BPM
WBC # BLD AUTO: 7.97 THOUSAND/UL (ref 4.31–10.16)

## 2018-01-28 PROCEDURE — 85730 THROMBOPLASTIN TIME PARTIAL: CPT | Performed by: INTERNAL MEDICINE

## 2018-01-28 PROCEDURE — 80048 BASIC METABOLIC PNL TOTAL CA: CPT | Performed by: INTERNAL MEDICINE

## 2018-01-28 PROCEDURE — 84100 ASSAY OF PHOSPHORUS: CPT | Performed by: INTERNAL MEDICINE

## 2018-01-28 PROCEDURE — 80053 COMPREHEN METABOLIC PANEL: CPT | Performed by: INTERNAL MEDICINE

## 2018-01-28 PROCEDURE — 82948 REAGENT STRIP/BLOOD GLUCOSE: CPT

## 2018-01-28 PROCEDURE — 83735 ASSAY OF MAGNESIUM: CPT | Performed by: INTERNAL MEDICINE

## 2018-01-28 PROCEDURE — 93010 ELECTROCARDIOGRAM REPORT: CPT | Performed by: INTERNAL MEDICINE

## 2018-01-28 PROCEDURE — 99233 SBSQ HOSP IP/OBS HIGH 50: CPT | Performed by: INTERNAL MEDICINE

## 2018-01-28 PROCEDURE — 85027 COMPLETE CBC AUTOMATED: CPT | Performed by: INTERNAL MEDICINE

## 2018-01-28 PROCEDURE — 71046 X-RAY EXAM CHEST 2 VIEWS: CPT

## 2018-01-28 PROCEDURE — 85007 BL SMEAR W/DIFF WBC COUNT: CPT | Performed by: INTERNAL MEDICINE

## 2018-01-28 RX ORDER — HEPARIN SODIUM 10000 [USP'U]/100ML
INJECTION, SOLUTION INTRAVENOUS
Status: COMPLETED
Start: 2018-01-28 | End: 2018-01-30

## 2018-01-28 RX ORDER — FUROSEMIDE 10 MG/ML
40 INJECTION INTRAMUSCULAR; INTRAVENOUS ONCE
Status: COMPLETED | OUTPATIENT
Start: 2018-01-28 | End: 2018-01-28

## 2018-01-28 RX ORDER — METOPROLOL TARTRATE 5 MG/5ML
5 INJECTION INTRAVENOUS EVERY 6 HOURS PRN
Status: DISCONTINUED | OUTPATIENT
Start: 2018-01-28 | End: 2018-02-02 | Stop reason: HOSPADM

## 2018-01-28 RX ORDER — DEXTROSE MONOHYDRATE 50 MG/ML
75 INJECTION, SOLUTION INTRAVENOUS CONTINUOUS
Status: DISCONTINUED | OUTPATIENT
Start: 2018-01-28 | End: 2018-01-29

## 2018-01-28 RX ORDER — POTASSIUM CHLORIDE 14.9 MG/ML
20 INJECTION INTRAVENOUS
Status: COMPLETED | OUTPATIENT
Start: 2018-01-28 | End: 2018-01-29

## 2018-01-28 RX ADMIN — POTASSIUM CHLORIDE 20 MEQ: 200 INJECTION, SOLUTION INTRAVENOUS at 13:15

## 2018-01-28 RX ADMIN — DEXTROSE 75 ML/HR: 5 SOLUTION INTRAVENOUS at 21:53

## 2018-01-28 RX ADMIN — DEXTROSE 125 ML/HR: 5 SOLUTION INTRAVENOUS at 05:27

## 2018-01-28 RX ADMIN — METRONIDAZOLE 500 MG: 500 INJECTION, SOLUTION INTRAVENOUS at 04:19

## 2018-01-28 RX ADMIN — METOPROLOL TARTRATE 5 MG: 1 INJECTION, SOLUTION INTRAVENOUS at 11:49

## 2018-01-28 RX ADMIN — POTASSIUM PHOSPHATE, MONOBASIC AND POTASSIUM PHOSPHATE, DIBASIC 12 MMOL: 224; 236 INJECTION, SOLUTION INTRAVENOUS at 11:41

## 2018-01-28 RX ADMIN — VALPROATE SODIUM 250 MG: 100 INJECTION, SOLUTION INTRAVENOUS at 09:16

## 2018-01-28 RX ADMIN — METRONIDAZOLE 500 MG: 500 INJECTION, SOLUTION INTRAVENOUS at 11:12

## 2018-01-28 RX ADMIN — HEPARIN SODIUM AND DEXTROSE 9.1 UNITS/KG/HR: 10000; 5 INJECTION INTRAVENOUS at 05:11

## 2018-01-28 RX ADMIN — FUROSEMIDE 40 MG: 10 INJECTION, SOLUTION INTRAMUSCULAR; INTRAVENOUS at 11:12

## 2018-01-28 RX ADMIN — VANCOMYCIN HYDROCHLORIDE 1000 MG: 1 INJECTION, SOLUTION INTRAVENOUS at 04:20

## 2018-01-28 RX ADMIN — VALPROATE SODIUM 250 MG: 100 INJECTION, SOLUTION INTRAVENOUS at 20:38

## 2018-01-28 RX ADMIN — Medication 500 MG: at 20:05

## 2018-01-28 RX ADMIN — METRONIDAZOLE 500 MG: 500 INJECTION, SOLUTION INTRAVENOUS at 20:30

## 2018-01-28 RX ADMIN — POTASSIUM CHLORIDE 20 MEQ: 200 INJECTION, SOLUTION INTRAVENOUS at 11:12

## 2018-01-28 RX ADMIN — Medication 500 MG: at 06:36

## 2018-01-28 NOTE — NUTRITION
01/28/18 1432   PES Statement   Problem Intake   Oral or Nutritional Support Intake (2) Inadequate oral intake NI-2 1   Related to Confusion; Catabolic Illness; Inability for PO   As evidenced by: NPO Status   Additional PES needed? Yes   PES Statement 2   Problem Intake   Related to Wound(s)   As evidenced by Poor wound healing   Nutrient (5) Increased nutrient needs (specify) NI-5 1  (protein/kcal)   Patient Nutrition Goals   Goal skin integrity improved;tolerate PO diet   Goal Status initiated   Timeframe to complete goal by next f/u   Recommendations/Interventions   Summary Per nursing, ST eval is postponed until 01/29  Per telephone, ST suggested a trial of pureed with nectar thick  Pending results    UPon advancement of diet, recommend Regular diet with Ensure enlive BID to aid in wound healing and meet estimated needs   Malnutrition/BMI Present No   Interventions Diet: to Advance   Nutrition Recommendations Continue diet as ordered  (see summary)   Nutrition Complexity Risk   Nutrition complexity level High risk   Nutrition review: 01/30/18  (diet advancement, need for supplements)   Follow up date 02/02/18

## 2018-01-28 NOTE — SPEECH THERAPY NOTE
Dysphagia f/u continued  I spoke c RN by phone  Pt more alert at this moment  I suggested trial of "pleasure feedings" of puree then honey thick liquid if tolerated  Plan:  F/u within 24 hrs

## 2018-01-28 NOTE — PROGRESS NOTES
Progress Note - Infectious Disease   Damian Padron [de-identified] y o  male MRN: [id number]  Unit/Bed#: -02 Encounter: [id number]    Assessment/Plan: 1  Sepsis/Lactic acidosis/Renal insufficiency/Obstructive uropathy/Pyuria/Left femoral CVC: [de-identified] yo male presenting from SNF w/ worsening mental status despite three days of empiric levofloxacin for cUTI/PNA  UA collected from old suprapubic catheter was notable for rufus pyuria and CXR with RLL linear atelectasis vs scaring  Suspect cUTI most likely etiology given suprapubic catheter and underwhelming findings on CXR  Urology has changed Sp catheter today with cultures pending        - awaiting repeat UA/micro and cx on new sample s/p SP catheter exchange on     - Klebsiella spp recovered from urine cx on old SP cath, sensitivities pending  - will f/u pending blood cx  - repeat CXR today shows no infiltrate, stopped oseltamavir  w/ influenza A/B PCR negative  - continue broad-spectrum Abx while awaiting pending cultures  - will check vanco trough before 4th dose  - dose Abx for acute on chronic renal insufficiency    Subjective/Objective   Subjective: Pt remains drowsy, noncommunicating    Agitated at times per nursing, yelling out     Objective:     HR:  [] 88  Resp:  [20-30] 22  BP: (138-168)/(57-74) 139/61  SpO2:  [93 %-97 %] 95 %  Temp (24hrs), Av 5 °F (36 9 °C), Min:97 2 °F (36 2 °C), Max:99 5 °F (37 5 °C)  Current: Temperature: 98 7 °F (37 1 °C)    Physical Exam:   Elderly drowsy male, NAD  VSS, Tmax:99 5  HEENT: no icterus   CARDIAC: RRR, no MRG, ICD pocket well-healed without erythema or warmth  LUNGS: ant fields w/ scattered rhonchi on RA  ABDOMEN: decreased BS, soft, SP catheter in place  EXTREMITIES: no edema or joint effusions  SKIN: no rash  NEURO: opens eyes to voice, tracks, unable to follow commands      Invasive Devices     Central Venous Catheter Line            CVC Central Lines 18 Triple 20cm 1 day          Drain Suprapubic Catheter 18 Fr  1 day                Lab, Imaging and other studies: I have personally reviewed pertinent reports     and I have personally reviewed pertinent films in PACS

## 2018-01-28 NOTE — PROGRESS NOTES
Progress Note - Renee Frias [de-identified] y o  male MRN: [id number]  Unit/Bed#: -02 Encounter: [id number]    Assessment:  Principal Problem:    Sepsis ([address] )  Active Problems:    Fever    UTI (urinary tract infection) due to urinary indwelling catheter (HCC)    Hypotension    Pneumonia due to infectious organism    History of pulmonary embolus (PE)  Resolved Problems:    * No resolved hospital problems  *      Plan:  · Sepsis secondary to likely pneumonia and UTI in the setting of suprapubic catheter  Continue on cefepime and vancomycin  Patient's Tamiflu was discontinued as influenza screen came back negative  Oxygen supplementation and bronchodilators  Will discontinue IV fluids  Chest x-ray is ordered for today  Follow-up results  Urine culture grew Klebsiella-enterobacter to group  Repeat UA/culture was ordered by ID  ID consult appreciated  Patient was seen by speech swallow and they recommended NPO  They will follow up again today  · History of PE  Patient was started on heparin drip as he was not able to take Xarelto  Will switch back to Xarelto when more awake and able to take oral medication  · Hypernatremia and hypokalemia  On D5 water  Potassium supplementation  Repeat BMP this evening  · Patient has left femoral line in place  Will attempt to get peripheral line and discontinue femoral central line  · Accu-Chek a c  HS with Humalog sliding scale  · DVT prophylaxis  · Discussed with patient's daughter in detail  Subjective:   Patient is seen and examined at bedside  Is more awake  Has underlying dementia  Afebrile  Unable to provide a good history  Objective:   Vitals: Blood pressure 144/63, pulse 92, temperature 98 6 °F (37 °C), temperature source Tympanic, resp  rate (!) 28, height 6' (1 829 m), weight 104 kg (229 lb 11 5 oz), SpO2 93 %  ,Body mass index is 31 16 kg/m²    SPO2 RA Rest    Flowsheet Row ED to Hosp-Admission (Current) from 1/26/2018 in [address] [place] [address] Intensive Care Unit   SpO2  93 %   SpO2 Activity  At Rest   O2 Device  Nasal cannula   O2 Flow Rate  1 L/min        I&O:   Intake/Output Summary (Last 24 hours) at 01/28/18 0704  Last data filed at 01/28/18 0527   Gross per 24 hour   Intake             2000 ml   Output             1100 ml   Net              900 ml       Physical Exam:    General- awake, lying comfortably in bed  Not in any acute distress  HEENT- CITLALLI, EOM intact  Neck- Supple, No JVD  CVS- regular, S1 and S2 normal   Chest- Bilateral Air entry, bibasilar rales present  Abdomen- soft, nontender, not distended, no guarding or rigidity, BS+  Extremities- has pedal edema, No calf tenderness  CNS-   patient has dementia    Invasive Devices     Central Venous Catheter Line            CVC Central Lines 01/27/18 Triple 20cm 1 day          Drain            Suprapubic Catheter 18 Fr  1 day                      Social History  reviewed  History reviewed  No pertinent family history   reviewed    Meds:  Current Facility-Administered Medications   Medication Dose Route Frequency Provider Last Rate Last Dose    acetaminophen (TYLENOL) tablet 650 mg  650 mg Oral Q6H PRN Kianna Bauer MD        cefepime (MAXIPIME) 500 mg in sodium chloride 0 9 % 50 mL IVPB  500 mg Intravenous Q12H Kianna Bauer  mL/hr at 01/28/18 0636 500 mg at 01/28/18 0636    dextrose 5 % infusion  125 mL/hr Intravenous Continuous Kianna Bauer  mL/hr at 01/28/18 0527 125 mL/hr at 01/28/18 0527    heparin (porcine) 25,000 units in 250 mL infusion (premix) **AcuDose Override Pull**             heparin (porcine) 25,000 units in 250 mL infusion (premix)  3-20 Units/kg/hr (Order-Specific) Intravenous Titrated Kianna TOMLIN MD 8 2 mL/hr at 01/28/18 0511 9 1 Units/kg/hr at 01/28/18 0511    heparin (porcine) injection 2,000 Units  2,000 Units Intravenous PRN Kianna Bauer MD        heparin (porcine) injection 4,000 Units  4,000 Units Intravenous PRN Edilson TOMLIN MD        insulin lispro (HumaLOG) 100 units/mL subcutaneous injection 1-6 Units  1-6 Units Subcutaneous Q6H Aleksandra Doyle MD        LORazepam (ATIVAN) 2 mg/mL injection 0 25 mg  0 25 mg Intravenous Q6H PRN Kianna Medina MD        metroNIDAZOLE (FLAGYL) IVPB (premix) 500 mg  500 mg Intravenous Q8H Kianna TOMLIN  mL/hr at 01/28/18 0419 500 mg at 01/28/18 0419    ondansetron (ZOFRAN) injection 4 mg  4 mg Intravenous Q6H PRN Kianna Medina MD        valproate (DEPACON) 250 mg in sodium chloride 0 9 % 50 mL IVPB  250 mg Intravenous Q12H Kianna Medina MD 50 mL/hr at 01/27/18 2121 250 mg at 01/27/18 2121    vancomycin (VANCOCIN) IVPB (premix) 1,000 mg  12 5 mg/kg (Adjusted) Intravenous Q24H iKanna Medina  mL/hr at 01/28/18 0420 1,000 mg at 01/28/18 0420      Prescriptions Prior to Admission   Medication    acetaminophen (TYLENOL) 325 mg tablet    atorvastatin (LIPITOR) 10 mg tablet    cholecalciferol (VITAMIN D3) 1,000 units tablet    diltiazem (DILACOR XR) 120 MG 24 hr capsule    divalproex sodium (DEPAKOTE) 250 mg EC tablet    ferrous sulfate 325 (65 Fe) mg tablet    furosemide (LASIX) 80 mg tablet    hydrALAZINE (APRESOLINE) 50 mg tablet    lisinopril (ZESTRIL) 40 mg tablet    LORazepam (ATIVAN) 0 5 mg tablet    magnesium hydroxide (MILK OF MAGNESIA) 400 mg/5 mL oral suspension    Melatonin 5 MG CAPS    metoprolol succinate (TOPROL-XL) 100 mg 24 hr tablet    potassium chloride (K-DUR,KLOR-CON) 20 mEq tablet    risperiDONE (RisperDAL) 0 25 mg tablet    rivaroxaban (XARELTO) 20 mg tablet    sertraline (ZOLOFT) 100 mg tablet       Labs:    Results from last 7 days  Lab Units 01/27/18  0456 01/27/18  0408 01/26/18  2218   WBC Thousand/uL 8 25  --  10 07   HEMOGLOBIN g/dL 12 0  --  15 2   HEMATOCRIT % 39 4  --  48 2   PLATELETS Thousands/uL 224 234 288   LYMPHO PCT % 14  --  19   MONO PCT MAN % 2*  --  4   EOSINO PCT MANUAL % 0  --  0 Results from last 7 days  Lab Units 01/27/18  1951 01/27/18  0456 01/26/18 2219   SODIUM mmol/L 150* 151* 149*   POTASSIUM mmol/L 3 6 3 1* 3 3*   CHLORIDE mmol/L 118* 116* 110*   CO2 mmol/L 22 23 25   BUN mg/dL 41* 42* 43*   CREATININE mg/dL 2 06* 2 73* 3 11*   CALCIUM mg/dL 7 2* 7 2* 8 4   TOTAL PROTEIN g/dL  --  6 0* 8 2   BILIRUBIN TOTAL mg/dL  --  0 20 0 30   ALK PHOS U/L  --  42* 58   ALT U/L  --  35 53   AST U/L  --  53* 74*   GLUCOSE RANDOM mg/dL 139 134 153*     No results found for: TROPONINI, CKMB, CKTOTAL    Results from last 7 days  Lab Units 01/26/18 2219   INR  1 68*     Lab Results   Component Value Date    BLOODCX No Growth at 24 hrs  01/26/2018    BLOODCX No Growth at 24 hrs  01/26/2018         Imaging:  Results for orders placed during the hospital encounter of 01/26/18   XR chest portable    Narrative CHEST     INDICATION:  sepsis cough  History taken directly from the electronic ordering system  COMPARISON:  None    VIEWS:   AP frontal    IMAGES:  1    FINDINGS:  AICD/pacemaker is noted, leads overlie the right atrium and right ventricle  Heart size is not well evaluated on this single portable AP view  Right hemidiaphragm is mildly elevated  Linear atelectasis vs scarring is noted in the right lower lung zone  No pneumothorax or pleural effusion  Visualized osseous structures appear within normal limits for the patient's age  Impression Linear atelectasis vs scarring noted in the right lower lung zone  Workstation performed: [id number]       No results found for this or any previous visit  Labs & Imaging: I have personally reviewed pertinent reports  VTE Pharmacologic Prophylaxis: Heparin drip    VTE Mechanical Prophylaxis: sequential compression device    Code Status:   Level 3 - DNAR and DNI      "This note has been constructed using a voice recognition system"      Sussy Harrington MD  1/28/2018,7:04 AM

## 2018-01-29 ENCOUNTER — TELEPHONE (OUTPATIENT)
Dept: UROLOGY | Facility: HOSPITAL | Age: 83
End: 2018-01-29

## 2018-01-29 PROBLEM — I10 ESSENTIAL HYPERTENSION: Status: ACTIVE | Noted: 2018-01-29

## 2018-01-29 PROBLEM — I95.9 HYPOTENSION: Status: RESOLVED | Noted: 2018-01-27 | Resolved: 2018-01-29

## 2018-01-29 PROBLEM — R50.9 FEVER: Status: RESOLVED | Noted: 2018-01-27 | Resolved: 2018-01-29

## 2018-01-29 PROBLEM — N17.9 AKI (ACUTE KIDNEY INJURY) (HCC): Status: ACTIVE | Noted: 2018-01-29

## 2018-01-29 PROBLEM — G30.9 ALZHEIMER'S DEMENTIA WITHOUT BEHAVIORAL DISTURBANCE (HCC): Status: ACTIVE | Noted: 2018-01-29

## 2018-01-29 PROBLEM — E87.0 HYPERNATREMIA: Status: ACTIVE | Noted: 2018-01-29

## 2018-01-29 PROBLEM — G93.41 ENCEPHALOPATHY, METABOLIC: Status: ACTIVE | Noted: 2018-01-29

## 2018-01-29 PROBLEM — F02.80 ALZHEIMER'S DEMENTIA WITHOUT BEHAVIORAL DISTURBANCE (HCC): Status: ACTIVE | Noted: 2018-01-29

## 2018-01-29 LAB
ANION GAP SERPL CALCULATED.3IONS-SCNC: 7 MMOL/L (ref 4–13)
ANISOCYTOSIS BLD QL SMEAR: PRESENT
APTT PPP: 64 SECONDS (ref 23–35)
BASOPHILS # BLD MANUAL: 0 THOUSAND/UL (ref 0–0.1)
BASOPHILS NFR MAR MANUAL: 0 % (ref 0–1)
BUN SERPL-MCNC: 22 MG/DL (ref 5–25)
CALCIUM SERPL-MCNC: 7.3 MG/DL (ref 8.3–10.1)
CHLORIDE SERPL-SCNC: 118 MMOL/L (ref 100–108)
CO2 SERPL-SCNC: 24 MMOL/L (ref 21–32)
CREAT SERPL-MCNC: 1.44 MG/DL (ref 0.6–1.3)
EOSINOPHIL # BLD MANUAL: 0.13 THOUSAND/UL (ref 0–0.4)
EOSINOPHIL NFR BLD MANUAL: 2 % (ref 0–6)
ERYTHROCYTE [DISTWIDTH] IN BLOOD BY AUTOMATED COUNT: 16.5 % (ref 11.6–15.1)
GFR SERPL CREATININE-BSD FRML MDRD: 46 ML/MIN/1.73SQ M
GLUCOSE SERPL-MCNC: 115 MG/DL (ref 65–140)
GLUCOSE SERPL-MCNC: 125 MG/DL (ref 65–140)
GLUCOSE SERPL-MCNC: 86 MG/DL (ref 65–140)
GLUCOSE SERPL-MCNC: 92 MG/DL (ref 65–140)
GLUCOSE SERPL-MCNC: 94 MG/DL (ref 65–140)
HCT VFR BLD AUTO: 36.2 % (ref 36.5–49.3)
HGB BLD-MCNC: 10.8 G/DL (ref 12–17)
LYMPHOCYTES # BLD AUTO: 1.47 THOUSAND/UL (ref 0.6–4.47)
LYMPHOCYTES # BLD AUTO: 23 % (ref 14–44)
MCH RBC QN AUTO: 25.8 PG (ref 26.8–34.3)
MCHC RBC AUTO-ENTMCNC: 29.8 G/DL (ref 31.4–37.4)
MCV RBC AUTO: 86 FL (ref 82–98)
MICROCYTES BLD QL AUTO: PRESENT
MONOCYTES # BLD AUTO: 0.32 THOUSAND/UL (ref 0–1.22)
MONOCYTES NFR BLD: 5 % (ref 4–12)
NEUTROPHILS # BLD MANUAL: 4.42 THOUSAND/UL (ref 1.85–7.62)
NEUTS SEG NFR BLD AUTO: 69 % (ref 43–75)
PHOSPHATE SERPL-MCNC: 2.3 MG/DL (ref 2.3–4.1)
PLATELET # BLD AUTO: 210 THOUSANDS/UL (ref 149–390)
PLATELET BLD QL SMEAR: ADEQUATE
PMV BLD AUTO: 10 FL (ref 8.9–12.7)
POTASSIUM SERPL-SCNC: 3.4 MMOL/L (ref 3.5–5.3)
RBC # BLD AUTO: 4.19 MILLION/UL (ref 3.88–5.62)
RBC MORPH BLD: PRESENT
SODIUM SERPL-SCNC: 149 MMOL/L (ref 136–145)
TOTAL CELLS COUNTED SPEC: 100
VARIANT LYMPHS # BLD AUTO: 1 %
WBC # BLD AUTO: 6.41 THOUSAND/UL (ref 4.31–10.16)

## 2018-01-29 PROCEDURE — 85730 THROMBOPLASTIN TIME PARTIAL: CPT | Performed by: INTERNAL MEDICINE

## 2018-01-29 PROCEDURE — 82948 REAGENT STRIP/BLOOD GLUCOSE: CPT

## 2018-01-29 PROCEDURE — 85027 COMPLETE CBC AUTOMATED: CPT | Performed by: INTERNAL MEDICINE

## 2018-01-29 PROCEDURE — 80048 BASIC METABOLIC PNL TOTAL CA: CPT | Performed by: INTERNAL MEDICINE

## 2018-01-29 PROCEDURE — 99233 SBSQ HOSP IP/OBS HIGH 50: CPT | Performed by: INTERNAL MEDICINE

## 2018-01-29 PROCEDURE — 85007 BL SMEAR W/DIFF WBC COUNT: CPT | Performed by: INTERNAL MEDICINE

## 2018-01-29 PROCEDURE — 84100 ASSAY OF PHOSPHORUS: CPT | Performed by: INTERNAL MEDICINE

## 2018-01-29 PROCEDURE — 92526 ORAL FUNCTION THERAPY: CPT

## 2018-01-29 RX ORDER — DEXTROSE, SODIUM CHLORIDE, AND POTASSIUM CHLORIDE 5; .2; .15 G/100ML; G/100ML; G/100ML
50 INJECTION INTRAVENOUS CONTINUOUS
Status: DISCONTINUED | OUTPATIENT
Start: 2018-01-29 | End: 2018-01-30

## 2018-01-29 RX ADMIN — Medication 500 MG: at 05:38

## 2018-01-29 RX ADMIN — HEPARIN SODIUM AND DEXTROSE 9.1 UNITS/KG/HR: 10000; 5 INJECTION INTRAVENOUS at 12:47

## 2018-01-29 RX ADMIN — DEXTROSE, SODIUM CHLORIDE, AND POTASSIUM CHLORIDE 50 ML/HR: 5; .2; .15 INJECTION INTRAVENOUS at 11:40

## 2018-01-29 RX ADMIN — METRONIDAZOLE 500 MG: 500 INJECTION, SOLUTION INTRAVENOUS at 02:51

## 2018-01-29 RX ADMIN — VALPROATE SODIUM 250 MG: 100 INJECTION, SOLUTION INTRAVENOUS at 10:04

## 2018-01-29 RX ADMIN — LORAZEPAM 0.25 MG: 2 INJECTION, SOLUTION INTRAMUSCULAR; INTRAVENOUS at 02:51

## 2018-01-29 RX ADMIN — VANCOMYCIN HYDROCHLORIDE 1000 MG: 1 INJECTION, SOLUTION INTRAVENOUS at 04:37

## 2018-01-29 RX ADMIN — METOPROLOL TARTRATE 5 MG: 1 INJECTION, SOLUTION INTRAVENOUS at 22:42

## 2018-01-29 RX ADMIN — SODIUM CHLORIDE 1000 MG: 900 INJECTION INTRAVENOUS at 17:25

## 2018-01-29 RX ADMIN — METRONIDAZOLE 500 MG: 500 INJECTION, SOLUTION INTRAVENOUS at 11:44

## 2018-01-29 RX ADMIN — METOPROLOL TARTRATE 5 MG: 1 INJECTION, SOLUTION INTRAVENOUS at 02:52

## 2018-01-29 RX ADMIN — VALPROATE SODIUM 250 MG: 100 INJECTION, SOLUTION INTRAVENOUS at 21:31

## 2018-01-29 NOTE — PLAN OF CARE
DISCHARGE PLANNING     Discharge to home or other facility with appropriate resources Not Progressing        INFECTION - ADULT     Absence or prevention of progression during hospitalization Not Progressing     Absence of fever/infection during neutropenic period Not Progressing        Knowledge Deficit     Patient/family/caregiver demonstrates understanding of disease process, treatment plan, medications, and discharge instructions Not Progressing        Nutrition/Hydration-ADULT     Nutrient/Hydration intake appropriate for improving, restoring or maintaining nutritional needs Not Progressing        PAIN - ADULT     Verbalizes/displays adequate comfort level or baseline comfort level Not Progressing        Potential for Falls     Patient will remain free of falls Not Progressing        Prexisting or High Potential for Compromised Skin Integrity     Skin integrity is maintained or improved Not Progressing

## 2018-01-29 NOTE — PROGRESS NOTES
Progress Note - Damian Padron [de-identified] y o  male MRN: [id number]    Unit/Bed#: -02 Encounter: [id number]      Assessment:    Principal Problem:    Sepsis ([facility] [address] )  Active Problems:    UTI (urinary tract infection) due to urinary indwelling catheter ([facility] [address] )    Pneumonia due to infectious organism    History of pulmonary embolus (PE)    Essential hypertension    Alzheimer's dementia without behavioral disturbance  Resolved Problems:    Fever    Hypotension      Plan:  Await final sensitivity of the Klebsiella in the urine  Patient is on cefepime  He is also on broad-spectrum coverage for suspected pneumonia which I think is less likely the issue  Repeat chest x-ray is pending  He is more alert will actually try and feed him pureed diet honey thickened liquids  Speech therapy following  Does open eyes  Change IV fluids to D5 and a quarter with potassium  Continue IV heparin until I can no he can take his medications and restart his oral anticoagulants  Subjective:   Opens eyes appears in no distress    Objective:     Vitals: Blood pressure 124/60, pulse 73, temperature 98 5 °F (36 9 °C), resp  rate 22, height 6' (1 829 m), weight 106 kg (233 lb 4 oz), SpO2 97 %  ,Body mass index is 31 63 kg/m²        Intake/Output Summary (Last 24 hours) at 01/29/18 0947  Last data filed at 01/29/18 0601   Gross per 24 hour   Intake          1128 75 ml   Output             2900 ml   Net         -1771 25 ml       Physical Exam:    Alert and awake in no acute distress  Head normocephalic, oral membranes are moist   Neck supple, no lymphadenopathy no JVD  Lungs clear to auscultation bilaterally no signs of consolidation  Heart regular rate and rythm, normal heart sounds some premature contractions  Abdomen soft, active bowel sounds, non-tender, non-distended  Extremities: no cyanosis, clubbing or edema  Skin: warm, dry, no discrete lesions  Neuro: alert, opens eyes        Invasive Devices     Central Venous Catheter Line CVC Central Lines 01/27/18 Triple 20cm 2 days          Drain            Suprapubic Catheter 18 Fr  2 days                            Lab, Imaging and other studies: I have personally reviewed pertinent reports  Results from last 7 days  Lab Units 01/29/18  0444 01/28/18  0629 01/27/18  0456   WBC Thousand/uL 6 41 7 97 8 25   HEMOGLOBIN g/dL 10 8* 10 8* 12 0   HEMATOCRIT % 36 2* 34 9* 39 4   PLATELETS Thousands/uL 210 200 224   LYMPHO PCT % 23 19 14   MONO PCT MAN % 5 1* 2*   EOSINO PCT MANUAL % 2 0 0       Results from last 7 days  Lab Units 01/29/18  0444 01/28/18  1718 01/28/18  0629  01/27/18  0456 01/26/18  2219   SODIUM mmol/L 149* 149* 149*  < > 151* 149*   POTASSIUM mmol/L 3 4* 3 8 3 3*  < > 3 1* 3 3*   CHLORIDE mmol/L 118* 117* 118*  < > 116* 110*   CO2 mmol/L 24 23 22  < > 23 25   BUN mg/dL 22 28* 32*  < > 42* 43*   CREATININE mg/dL 1 44* 1 56* 1 71*  < > 2 73* 3 11*   CALCIUM mg/dL 7 3* 7 3* 7 2*  < > 7 2* 8 4   TOTAL PROTEIN g/dL  --   --  5 8*  --  6 0* 8 2   BILIRUBIN TOTAL mg/dL  --   --  0 20  --  0 20 0 30   ALK PHOS U/L  --   --  39*  --  42* 58   ALT U/L  --   --  28  --  35 53   AST U/L  --   --  47*  --  53* 74*   GLUCOSE RANDOM mg/dL 94 101 103  < > 134 153*   < > = values in this interval not displayed  No results found for: TROPONINI, CKMB, CKTOTAL    Results from last 7 days  Lab Units 01/26/18  2219   INR  1 68*     Lab Results   Component Value Date    BLOODCX No Growth at 48 hrs  01/26/2018    BLOODCX No Growth at 48 hrs  01/26/2018    URINECX Culture results to follow  01/27/2018    URINECX >100,000 cfu/ml Klebsiella-Enterobacter  group (A) 01/26/2018       Imaging:  Results for orders placed during the hospital encounter of 01/26/18   XR chest portable    Narrative CHEST     INDICATION:  sepsis cough  History taken directly from the electronic ordering system          COMPARISON:  None    VIEWS:   AP frontal    IMAGES:  1    FINDINGS:  AICD/pacemaker is noted, leads overlie the right atrium and right ventricle  Heart size is not well evaluated on this single portable AP view  Right hemidiaphragm is mildly elevated  Linear atelectasis vs scarring is noted in the right lower lung zone  No pneumothorax or pleural effusion  Visualized osseous structures appear within normal limits for the patient's age  Impression Linear atelectasis vs scarring noted in the right lower lung zone  Workstation performed: [id number]       No results found for this or any previous visit  PATIENT CENTERED ROUNDS: I have performed rounds with the nursing staff  This note has been constructed using a voice recognition system      Yodit Hopkins MD

## 2018-01-29 NOTE — PLAN OF CARE
Problem: Nutrition/Hydration-ADULT  Goal: Nutrient/Hydration intake appropriate for improving, restoring or maintaining nutritional needs  Monitor and assess patient's nutrition/hydration status for malnutrition (ex- brittle hair, bruises, dry skin, pale skin and conjunctiva, muscle wasting, smooth red tongue, and disorientation)  Collaborate with interdisciplinary team and initiate plan and interventions as ordered  Monitor patient's weight and dietary intake as ordered or per policy  Utilize nutrition screening tool and intervene per policy  Determine patient's food preferences and provide high-protein, high-caloric foods as appropriate       INTERVENTIONS:  - Monitor oral intake, urinary output, labs, and treatment plans  - Assess nutrition and hydration status and recommend course of action  - Evaluate amount of meals eaten  - Assist patient with eating if necessary   - Allow adequate time for meals  - Recommend/ encourage appropriate diets, oral nutritional supplements, and vitamin/mineral supplements  - Order, calculate, and assess calorie counts as needed  - Recommend, monitor, and adjust tube feedings and TPN/PPN based on assessed needs  - Assess need for intravenous fluids  - Provide specific nutrition/hydration education as appropriate  - Include patient/family/caregiver in decisions related to nutrition    Outcome: Progressing      Problem: Prexisting or High Potential for Compromised Skin Integrity  Goal: Skin integrity is maintained or improved  INTERVENTIONS:  - Identify patients at risk for skin breakdown  - Assess and monitor skin integrity  - Assess and monitor nutrition and hydration status  - Monitor labs (i e  albumin)  - Assess for incontinence   - Turn and reposition patient  - Assist with mobility/ambulation  - Relieve pressure over bony prominences  - Avoid friction and shearing  - Provide appropriate hygiene as needed including keeping skin clean and dry  - Evaluate need for skin moisturizer/barrier cream  - Collaborate with interdisciplinary team (i e  Nutrition, Rehabilitation, etc )   - Patient/family teaching   Outcome: Progressing      Problem: Potential for Falls  Goal: Patient will remain free of falls  INTERVENTIONS:  - Assess patient frequently for physical needs  -  Identify cognitive and physical deficits and behaviors that affect risk of falls    -  Savage fall precautions as indicated by assessment   - Educate patient/family on patient safety including physical limitations  - Instruct patient to call for assistance with activity based on assessment  - Modify environment to reduce risk of injury  - Consider OT/PT consult to assist with strengthening/mobility   Outcome: Progressing      Problem: PAIN - ADULT  Goal: Verbalizes/displays adequate comfort level or baseline comfort level  Interventions:  - Encourage patient to monitor pain and request assistance  - Assess pain using appropriate pain scale  - Administer analgesics based on type and severity of pain and evaluate response  - Implement non-pharmacological measures as appropriate and evaluate response  - Consider cultural and social influences on pain and pain management  - Notify physician/advanced practitioner if interventions unsuccessful or patient reports new pain   Outcome: Progressing      Problem: INFECTION - ADULT  Goal: Absence or prevention of progression during hospitalization  INTERVENTIONS:  - Assess and monitor for signs and symptoms of infection  - Monitor lab/diagnostic results  - Monitor all insertion sites, i e  indwelling lines, tubes, and drains  - Monitor endotracheal (as able) and nasal secretions for changes in amount and color  - Savage appropriate cooling/warming therapies per order  - Administer medications as ordered  - Instruct and encourage patient and family to use good hand hygiene technique  - Identify and instruct in appropriate isolation precautions for identified infection/condition Outcome: Progressing    Goal: Absence of fever/infection during neutropenic period  INTERVENTIONS:  - Monitor WBC  - Implement neutropenic guidelines   Outcome: Progressing      Problem: DISCHARGE PLANNING  Goal: Discharge to home or other facility with appropriate resources  INTERVENTIONS:  - Identify barriers to discharge w/patient and caregiver  - Arrange for needed discharge resources and transportation as appropriate  - Identify discharge learning needs (meds, wound care, etc )  - Arrange for interpretive services to assist at discharge as needed  - Refer to Case Management Department for coordinating discharge planning if the patient needs post-hospital services based on physician/advanced practitioner order or complex needs related to functional status, cognitive ability, or social support system   Outcome: Progressing      Problem: Knowledge Deficit  Goal: Patient/family/caregiver demonstrates understanding of disease process, treatment plan, medications, and discharge instructions  Complete learning assessment and assess knowledge base    Interventions:  - Provide teaching at level of understanding  - Provide teaching via preferred learning methods   Outcome: Progressing      Problem: DISCHARGE PLANNING - CARE MANAGEMENT  Goal: Discharge to post-acute care or home with appropriate resources  INTERVENTIONS:  - Conduct assessment to determine patient/family and health care team treatment goals, and need for post-acute services based on payer coverage, community resources, and patient preferences, and barriers to discharge  - Address psychosocial, clinical, and financial barriers to discharge as identified in assessment in conjunction with the patient/family and health care team  - Arrange appropriate level of post-acute services according to patient's   needs and preference and payer coverage in collaboration with the physician and health care team  - Communicate with and update the patient/family, physician, and health care team regarding progress on the discharge plan  - Arrange appropriate transportation to post-acute venues   Outcome: Progressing      Problem: NEUROSENSORY - ADULT  Goal: Achieves stable or improved neurological status  INTERVENTIONS  - Monitor and report changes in neurological status  - Initiate measures to prevent increased intracranial pressure  - Maintain blood pressure and fluid volume within ordered parameters to optimize cerebral perfusion  - Monitor temperature, glucose, and sodium or any other associated labs   Initiate appropriate interventions as ordered  - Monitor for seizure activity   - Administer anti-seizure medications as ordered  Outcome: Progressing      Problem: GENITOURINARY - ADULT  Goal: Urinary catheter remains patent  INTERVENTIONS:  - Assess patency of urinary catheter  - If patient has a chronic valdes, consider changing catheter if non-functioning  - Follow guidelines for intermittent irrigation of non-functioning urinary catheter  Outcome: Progressing      Problem: METABOLIC, FLUID AND ELECTROLYTES - ADULT  Goal: Fluid balance maintained  INTERVENTIONS:  - Monitor labs and assess for signs and symptoms of volume excess or deficit  - Monitor I/O and WT  - Instruct patient on fluid and nutrition as appropriate  Outcome: Progressing

## 2018-01-29 NOTE — PROGRESS NOTES
Progress Note - Infectious Disease   Pricila Lazo [de-identified] y o  male MRN: [id number]  Unit/Bed#: -02 Encounter: [id number]    Assessment: 1  Sepsis/Lactic acidosis/Renal insufficiency/Obstructive uropathy/Pyuria/Left femoral CVC: [de-identified] yo male presenting from SNF w/ worsening mental status despite three days of empiric levofloxacin for cUTI/PNA  Dicie  collected from old suprapubic catheter was notable for rufus pyuria and CXR with RLL linear atelectasis vs scaring and repeat CXR showed noactive pulmonary disease   Suspect cUTI most likely etiology given suprapubic catheter and underwhelming findings on CXR   Urology has changed Sp catheter with cultures pending        - D/C vancomycin, cefepime, and metronidazole and start ertapenem while awaiting repeat UA/micro and cx on new sample s/p SP catheter exchange on   ESBL Klebsiella spp recovered from urine cx on old Sp cath  Subjective/Objective   Chief Complaint: ESBL Klebsiella UTI    Subjective: remains afebrile  No acute events noted  Objective:     HR:  [73-90] 90  Resp:  [21-22] 22  BP: (124-164)/(60-75) 134/61  SpO2:  [94 %-98 %] 95 %  Temp (24hrs), Av 8 °F (37 1 °C), Min:97 9 °F (36 6 °C), Max:99 5 °F (37 5 °C)  Current: Temperature: 97 9 °F (36 6 °C)    Physical Exam:  Elderly drowsy male, NAD  VSS, Tmax:99 5  CARDIAC: RRR, no MRG, ICD pocket well-healed without erythema or warmth  LUNGS: ant fields w/ scattered rhonchi on RA  ABDOMEN: decreased BS, soft, SP catheter in place  EXTREMITIES: no edema  SKIN: no rash    Invasive Devices     Central Venous Catheter Line            CVC Central Lines 18 Triple 20cm 2 days          Drain            Suprapubic Catheter 18 Fr  2 days                Lab, Imaging and other studies: I have personally reviewed pertinent reports

## 2018-01-29 NOTE — PROGRESS NOTES
Patient's two rings on left hand very tight and appears to be cutting off circulation  Able to remove rings with lubrication with ease  Rings placed in bag at bedside  Will notify patient's daughter Orpha Daniel of this action

## 2018-01-29 NOTE — TELEPHONE ENCOUNTER
Dr Michael Cintron saw River Jerry in the hospital and changed his SPT 18f 10 ml balloon  Patient will need f/u with AP per Dr Angela Maravilla with [facility] paperwork and SPT change    Patient lives in [facility] - He is currently an inpatient    Patient was discharged to nursing home 2/2 need to discuss with Dr Angela Maraivlla

## 2018-01-29 NOTE — SPEECH THERAPY NOTE
Speech Language/Pathology    Speech/Language Pathology Progress Note    Patient Name: Jenelle Smith  JYNHD'P Date: 1/29/2018     Problem List  Patient Active Problem List   Diagnosis    [facility]epsis [facility] [address]    UTI (urinary tract infection) due to urinary indwelling catheter ([facility] [address] )    Pneumonia due to infectious organism    History of pulmonary embolus (PE)    Essential hypertension    Alzheimer's dementia without behavioral disturbance        Past Medical History  History reviewed  No pertinent past medical history  Past Surgical History  History reviewed  No pertinent surgical history  Subjective:    Pt was seen for skilled ST to assess for swallowing safety  Pt now has pureed diet with HTL ordered by MD    CXR: Linear atelectasis vs scarring noted in the right lower lung zone  Pt was alert, requesting a drink, calling out, confused  Objective:    Pt was assessed with 4 oz applesauce, 2 oz HTL  Pt demonstrated fair bolus retrieval from spoon with adequate lip seal  Manipulation and transfer were mild-moderately delayed initially with moderate delay in swallow initiation  Hyolaryngeal rise was weak with occasional secondary dry swallows noted  No s/s of aspiration noted initially with 4 oz applesauce and 3 tsps HTL  However as session progressed pt began holding HTL and pureed trials in his mouth  He required max verbal and tactile cues to initiate a swallow which resulted in delayed weak coughing  Trials were then stopped as pt was consistently holding trials in his mouth and he required max cues to initiate a swallow  SLP spoke with MD and nurse in relation to concerns  MD stated he wishes to see how pt does today with the diet  Assessment:    Pt appeared to tolerate puree and HTL by tsp initially however as session progressed pt began holding food/liquids in his mouth resulting in s/s of aspiration (suspect premature spill and reduce airway protection with prolonged oral holding)  Plan/Recommendations:    SLP is recommending NPO for now, medications crushed in puree     Defer to MD as MD is requesting pt remain on oral diet for now  Speech to follow

## 2018-01-30 ENCOUNTER — APPOINTMENT (INPATIENT)
Dept: CT IMAGING | Facility: HOSPITAL | Age: 83
DRG: 698 | End: 2018-01-30
Payer: MEDICARE

## 2018-01-30 ENCOUNTER — APPOINTMENT (INPATIENT)
Dept: NON INVASIVE DIAGNOSTICS | Facility: HOSPITAL | Age: 83
DRG: 698 | End: 2018-01-30
Payer: MEDICARE

## 2018-01-30 PROBLEM — I48.0 PAROXYSMAL ATRIAL FIBRILLATION (HCC): Status: ACTIVE | Noted: 2018-01-30

## 2018-01-30 LAB
ANION GAP SERPL CALCULATED.3IONS-SCNC: 8 MMOL/L (ref 4–13)
ANISOCYTOSIS BLD QL SMEAR: PRESENT
APTT PPP: 55 SECONDS (ref 23–35)
APTT PPP: 55 SECONDS (ref 23–35)
APTT PPP: 94 SECONDS (ref 23–35)
ATRIAL RATE: 53 BPM
BACTERIA UR CULT: ABNORMAL
BASOPHILS # BLD MANUAL: 0.1 THOUSAND/UL (ref 0–0.1)
BASOPHILS NFR MAR MANUAL: 2 % (ref 0–1)
BUN SERPL-MCNC: 16 MG/DL (ref 5–25)
CALCIUM SERPL-MCNC: 7.4 MG/DL (ref 8.3–10.1)
CHLORIDE SERPL-SCNC: 117 MMOL/L (ref 100–108)
CO2 SERPL-SCNC: 24 MMOL/L (ref 21–32)
CREAT SERPL-MCNC: 1.26 MG/DL (ref 0.6–1.3)
EOSINOPHIL # BLD MANUAL: 0.15 THOUSAND/UL (ref 0–0.4)
EOSINOPHIL NFR BLD MANUAL: 3 % (ref 0–6)
ERYTHROCYTE [DISTWIDTH] IN BLOOD BY AUTOMATED COUNT: 16 % (ref 11.6–15.1)
GFR SERPL CREATININE-BSD FRML MDRD: 54 ML/MIN/1.73SQ M
GLUCOSE SERPL-MCNC: 120 MG/DL (ref 65–140)
GLUCOSE SERPL-MCNC: 75 MG/DL (ref 65–140)
GLUCOSE SERPL-MCNC: 90 MG/DL (ref 65–140)
GLUCOSE SERPL-MCNC: 91 MG/DL (ref 65–140)
GLUCOSE SERPL-MCNC: 93 MG/DL (ref 65–140)
HCT VFR BLD AUTO: 35 % (ref 36.5–49.3)
HGB BLD-MCNC: 10.5 G/DL (ref 12–17)
LYMPHOCYTES # BLD AUTO: 2 THOUSAND/UL (ref 0.6–4.47)
LYMPHOCYTES # BLD AUTO: 39 % (ref 14–44)
MCH RBC QN AUTO: 25.8 PG (ref 26.8–34.3)
MCHC RBC AUTO-ENTMCNC: 30 G/DL (ref 31.4–37.4)
MCV RBC AUTO: 86 FL (ref 82–98)
MONOCYTES # BLD AUTO: 0.26 THOUSAND/UL (ref 0–1.22)
MONOCYTES NFR BLD: 5 % (ref 4–12)
NEUTROPHILS # BLD MANUAL: 2.62 THOUSAND/UL (ref 1.85–7.62)
NEUTS SEG NFR BLD AUTO: 51 % (ref 43–75)
PLATELET # BLD AUTO: 219 THOUSANDS/UL (ref 149–390)
PLATELET BLD QL SMEAR: ADEQUATE
PMV BLD AUTO: 10.5 FL (ref 8.9–12.7)
POTASSIUM SERPL-SCNC: 3.3 MMOL/L (ref 3.5–5.3)
QRS AXIS: -45 DEGREES
QRSD INTERVAL: 122 MS
QT INTERVAL: 400 MS
QTC INTERVAL: 531 MS
RBC # BLD AUTO: 4.07 MILLION/UL (ref 3.88–5.62)
RBC MORPH BLD: PRESENT
SODIUM SERPL-SCNC: 149 MMOL/L (ref 136–145)
T WAVE AXIS: 7 DEGREES
TOTAL CELLS COUNTED SPEC: 100
VENTRICULAR RATE: 106 BPM
WBC # BLD AUTO: 5.13 THOUSAND/UL (ref 4.31–10.16)

## 2018-01-30 PROCEDURE — 85027 COMPLETE CBC AUTOMATED: CPT | Performed by: INTERNAL MEDICINE

## 2018-01-30 PROCEDURE — 93010 ELECTROCARDIOGRAM REPORT: CPT | Performed by: INTERNAL MEDICINE

## 2018-01-30 PROCEDURE — 82948 REAGENT STRIP/BLOOD GLUCOSE: CPT

## 2018-01-30 PROCEDURE — 93005 ELECTROCARDIOGRAM TRACING: CPT | Performed by: INTERNAL MEDICINE

## 2018-01-30 PROCEDURE — 93306 TTE W/DOPPLER COMPLETE: CPT

## 2018-01-30 PROCEDURE — 85730 THROMBOPLASTIN TIME PARTIAL: CPT | Performed by: INTERNAL MEDICINE

## 2018-01-30 PROCEDURE — 74176 CT ABD & PELVIS W/O CONTRAST: CPT

## 2018-01-30 PROCEDURE — 99233 SBSQ HOSP IP/OBS HIGH 50: CPT | Performed by: INTERNAL MEDICINE

## 2018-01-30 PROCEDURE — 85007 BL SMEAR W/DIFF WBC COUNT: CPT | Performed by: INTERNAL MEDICINE

## 2018-01-30 PROCEDURE — 80048 BASIC METABOLIC PNL TOTAL CA: CPT | Performed by: INTERNAL MEDICINE

## 2018-01-30 RX ORDER — FUROSEMIDE 10 MG/ML
20 INJECTION INTRAMUSCULAR; INTRAVENOUS ONCE
Status: COMPLETED | OUTPATIENT
Start: 2018-01-30 | End: 2018-01-30

## 2018-01-30 RX ORDER — METOPROLOL TARTRATE 5 MG/5ML
2.5 INJECTION INTRAVENOUS EVERY 6 HOURS
Status: DISCONTINUED | OUTPATIENT
Start: 2018-01-30 | End: 2018-02-01

## 2018-01-30 RX ADMIN — VALPROATE SODIUM 250 MG: 100 INJECTION, SOLUTION INTRAVENOUS at 09:19

## 2018-01-30 RX ADMIN — FUROSEMIDE 20 MG: 10 INJECTION, SOLUTION INTRAVENOUS at 09:18

## 2018-01-30 RX ADMIN — LORAZEPAM 0.25 MG: 2 INJECTION, SOLUTION INTRAMUSCULAR; INTRAVENOUS at 20:31

## 2018-01-30 RX ADMIN — SODIUM CHLORIDE 1000 MG: 900 INJECTION INTRAVENOUS at 17:23

## 2018-01-30 RX ADMIN — HEPARIN SODIUM AND DEXTROSE 13 UNITS/KG/HR: 10000; 5 INJECTION INTRAVENOUS at 17:24

## 2018-01-30 RX ADMIN — VALPROATE SODIUM 250 MG: 100 INJECTION, SOLUTION INTRAVENOUS at 20:31

## 2018-01-30 RX ADMIN — METOPROLOL TARTRATE 2.5 MG: 5 INJECTION, SOLUTION INTRAVENOUS at 20:31

## 2018-01-30 RX ADMIN — HEPARIN SODIUM 2000 UNITS: 1000 INJECTION, SOLUTION INTRAVENOUS; SUBCUTANEOUS at 06:47

## 2018-01-30 RX ADMIN — METOPROLOL TARTRATE 2.5 MG: 5 INJECTION, SOLUTION INTRAVENOUS at 17:23

## 2018-01-30 RX ADMIN — METOPROLOL TARTRATE 5 MG: 1 INJECTION, SOLUTION INTRAVENOUS at 06:34

## 2018-01-30 RX ADMIN — POTASSIUM CHLORIDE: 149 INJECTION, SOLUTION, CONCENTRATE INTRAVENOUS at 09:19

## 2018-01-30 RX ADMIN — METOPROLOL TARTRATE 2.5 MG: 5 INJECTION, SOLUTION INTRAVENOUS at 09:18

## 2018-01-30 NOTE — PROGRESS NOTES
Progress Note - James Welda [de-identified] y o  male MRN: [id number]    Unit/Bed#: -02 Encounter: [id number]      Assessment:  Patient Active Problem List    Diagnosis Date Noted    UTI (urinary tract infection) due to urinary indwelling catheter ([facility] [address] ) 01/27/2018     Priority: A    Sepsis ([facility] [address] ) 01/27/2018     Priority: B    [facility] 01/29/2018     Priority: C    Encephalopathy, metabolic 97/75/2889     Priority: D    Hypernatremia 01/29/2018     Priority: E    [facility] 01/30/2018     Priority: F    Essential hypertension 01/29/2018    Alzheimer's dementia without behavioral disturbance 01/29/2018    History of pulmonary embolus (PE) 01/28/2018       Plan: Will keep patient NPO until speech evaluation to see if he is safe to swallow anti medications  Continue IV ertapenem for suprapubic catheter associated UTI causing sepsis  Patient's atrial fibrillation appears to be chronic and is currently rate controlled  Will evaluate his LV function with echo  Will IV Lopressor for hypertension as well as rate control purposes of atrial fibrillation  His acute kidney injury is improved creatinine is 1 26 this morning  Will continue monitoring patient's sodium and potassium  I changed IV fluids to D5 water with 40 mg of potassium chloride at 50 an hour  Patient does have evidence of some volume overload will give him Lasix 20 mg IV x1    Subjective:     Patient denies being in pain  Otherwise poor historian  Patient's nurse reports no vomiting, diarrhea, signs of overt bleeding, suprapubic catheter is draining well    All other ROS are negative  Objective:     Vitals: Blood pressure (!) 183/91, pulse (!) 113, temperature 98 4 °F (36 9 °C), resp  rate (!) 26, height 6' (1 829 m), weight 105 kg (231 lb 11 3 oz), SpO2 92 %  ,Body mass index is 31 42 kg/m²        Intake/Output Summary (Last 24 hours) at 01/30/18 0804  Last data filed at 01/30/18 6300   Gross per 24 hour   Intake           704 13 ml   Output             1250 ml   Net          -545 87 ml       Physical Exam:    Alert and awake in no acute distress on oxygen  Head normocephalic, PERRLA   Oral membranes are moist,  Neck supple, no lymphadenopathy  Lungs scant inspiratory crackles are heard at the bases bilaterally  Heart irregularly irregular distant heart sounds  Abdomen soft, active bowel sounds, non-tender, non-distended  Extremities: there is no joint effusion or warmth  Skin: warm, no hives seen, no cellulitis seen he had trace pedal edema  Neuro: no facial droop, he appears to have left foot drop, left  strength is also mildly decreased compared to right  Lab, Imaging and other studies: I have personally reviewed pertinent reports  See below         Current Facility-Administered Medications   Medication Dose Route Frequency    acetaminophen (TYLENOL) tablet 650 mg  650 mg Oral Q6H PRN    ertapenem (INVanz) 1,000 mg in sodium chloride 0 9 % 50 mL IVPB  1,000 mg Intravenous Q24H    furosemide (LASIX) injection 20 mg  20 mg Intravenous Once    heparin (porcine) 25,000 units in 250 mL infusion (premix)  3-20 Units/kg/hr (Order-Specific) Intravenous Titrated    heparin (porcine) injection 2,000 Units  2,000 Units Intravenous PRN    heparin (porcine) injection 4,000 Units  4,000 Units Intravenous PRN    insulin lispro (HumaLOG) 100 units/mL subcutaneous injection 1-6 Units  1-6 Units Subcutaneous Q6H    LORazepam (ATIVAN) 2 mg/mL injection 0 25 mg  0 25 mg Intravenous Q6H PRN    metoprolol (LOPRESSOR) injection 5 mg  5 mg Intravenous Q6H PRN    ondansetron (ZOFRAN) injection 4 mg  4 mg Intravenous Q6H PRN    potassium chloride 40 mEq in dextrose 5 % 1,000 mL infusion   Intravenous Continuous    valproate (DEPACON) 250 mg in sodium chloride 0 9 % 50 mL IVPB  250 mg Intravenous Q12H       Admission on 01/26/2018   No results displayed because visit has over 200 results  Prior to Admission Medications   Prescriptions Last Dose Informant Patient Reported? Taking?    LORazepam (ATIVAN) 0 5 mg tablet   Yes Yes   Sig: Take 0 5 mg by mouth 3 (three) times a day   Melatonin 5 MG CAPS   Yes Yes   Sig: Take 5 mg by mouth   acetaminophen (TYLENOL) 325 mg tablet   Yes Yes   Sig: Take 650 mg by mouth every 6 (six) hours as needed for mild pain   atorvastatin (LIPITOR) 10 mg tablet   Yes Yes   Sig: Take 10 mg by mouth daily   cholecalciferol (VITAMIN D3) 1,000 units tablet   Yes Yes   Sig: Take 1,000 Units by mouth daily   diltiazem (DILACOR XR) 120 MG 24 hr capsule   Yes Yes   Sig: Take 120 mg by mouth daily   divalproex sodium (DEPAKOTE) 250 mg EC tablet   Yes Yes   Sig: Take 250 mg by mouth 2 (two) times a day   ferrous sulfate 325 (65 Fe) mg tablet   Yes Yes   Sig: Take 325 mg by mouth daily with breakfast   furosemide (LASIX) 80 mg tablet   Yes Yes   Sig: Take 80 mg by mouth 2 (two) times a day   hydrALAZINE (APRESOLINE) 50 mg tablet   Yes Yes   Sig: Take 50 mg by mouth 3 (three) times a day   lisinopril (ZESTRIL) 40 mg tablet   Yes Yes   Sig: Take 40 mg by mouth 2 (two) times a day   magnesium hydroxide (MILK OF MAGNESIA) 400 mg/5 mL oral suspension   Yes Yes   Sig: Take 30 mL by mouth daily as needed for constipation   metoprolol succinate (TOPROL-XL) 100 mg 24 hr tablet   Yes Yes   Sig: Take 100 mg by mouth daily   potassium chloride (K-DUR,KLOR-CON) 20 mEq tablet   Yes Yes   Sig: Take 20 mEq by mouth 2 (two) times a day   risperiDONE (RisperDAL) 0 25 mg tablet   Yes Yes   Sig: Take 0 25 mg by mouth 2 (two) times a day   rivaroxaban (XARELTO) 20 mg tablet   Yes Yes   Sig: Take 20 mg by mouth   sertraline (ZOLOFT) 100 mg tablet   Yes Yes   Sig: Take 50 mg by mouth daily      Facility-Administered Medications: None         Tommie Pop MD

## 2018-01-30 NOTE — PROGRESS NOTES
Progress Note - Infectious Disease   Catrina Cynthia [de-identified] y o  male MRN: [id number]  Unit/Bed#: -02 Encounter: [id number]    Assessment: 1  Sepsis/Lactic acidosis/Renal insufficiency/Obstructive uropathy/Pyuria/Left femoral CVC: [de-identified] yo male presenting from SNF w/ worsening mental status despite three days of empiric levofloxacin for cUTI/PNA  Caitie Bodily collected from old suprapubic catheter was notable for rufus pyuria and CXR with RLL linear atelectasis vs scaring and repeat CXR showed noactive pulmonary disease   Suspect cUTI most likely etiology given suprapubic catheter and underwhelming findings on CXR   Urology has changed Sp catheter with cultures growing <20K staph aureus, GNR, and Enterococcus        - Continue ertapenem while awaiting repeat UA/micro and cx on new sample s/p SP catheter exchange on   ESBL Klebsiella spp recovered from urine cx on old Sp cath  - Obtain CT A/P to r/o occult abscess with Staph aureus growing in urine cx  Subjective/Objective   Chief Complaint: ESBL Klebsiella UTI    Subjective: remains afebrile and non-verbal      Objective:     HR:  [] 100  Resp:  [16-28] 24  BP: (148-188)/(70-91) 148/85  SpO2:  [92 %-95 %] 95 %  Temp (24hrs), Av 2 °F (36 8 °C), Min:97 5 °F (36 4 °C), Max:98 6 °F (37 °C)  Current: Temperature: 97 5 °F (36 4 °C)    Physical Exam:  Elderly drowsy male, NAD  Not following commands  VSS, Tmax:99 5  CARDIAC: RRR, no MRGLUNGS: ant fields w/ scattered rhonchi on RA  ABDOMEN: decreased BS, soft, SP catheter in place  EXTREMITIES: no edema  SKIN: no rash    Invasive Devices     Peripheral Intravenous Line            Peripheral IV 18 Left Forearm less than 1 day    Peripheral IV 18 Right Antecubital less than 1 day          Drain            Suprapubic Catheter 18 Fr  3 days                Lab, Imaging and other studies: I have personally reviewed pertinent reports

## 2018-01-31 PROBLEM — A41.9 SEPSIS (HCC): Status: RESOLVED | Noted: 2018-01-27 | Resolved: 2018-01-31

## 2018-01-31 PROBLEM — G93.41 ENCEPHALOPATHY, METABOLIC: Status: RESOLVED | Noted: 2018-01-29 | Resolved: 2018-01-31

## 2018-01-31 LAB
ANION GAP SERPL CALCULATED.3IONS-SCNC: 9 MMOL/L (ref 4–13)
APTT PPP: 48 SECONDS (ref 23–35)
APTT PPP: 72 SECONDS (ref 23–35)
APTT PPP: 77 SECONDS (ref 23–35)
BUN SERPL-MCNC: 13 MG/DL (ref 5–25)
CALCIUM SERPL-MCNC: 7.5 MG/DL (ref 8.3–10.1)
CHLORIDE SERPL-SCNC: 116 MMOL/L (ref 100–108)
CO2 SERPL-SCNC: 24 MMOL/L (ref 21–32)
CREAT SERPL-MCNC: 1.25 MG/DL (ref 0.6–1.3)
GFR SERPL CREATININE-BSD FRML MDRD: 54 ML/MIN/1.73SQ M
GLUCOSE SERPL-MCNC: 123 MG/DL (ref 65–140)
GLUCOSE SERPL-MCNC: 125 MG/DL (ref 65–140)
GLUCOSE SERPL-MCNC: 126 MG/DL (ref 65–140)
GLUCOSE SERPL-MCNC: 94 MG/DL (ref 65–140)
GLUCOSE SERPL-MCNC: 98 MG/DL (ref 65–140)
POTASSIUM SERPL-SCNC: 3.3 MMOL/L (ref 3.5–5.3)
SODIUM SERPL-SCNC: 149 MMOL/L (ref 136–145)

## 2018-01-31 PROCEDURE — 82948 REAGENT STRIP/BLOOD GLUCOSE: CPT

## 2018-01-31 PROCEDURE — 85730 THROMBOPLASTIN TIME PARTIAL: CPT | Performed by: INTERNAL MEDICINE

## 2018-01-31 PROCEDURE — 99233 SBSQ HOSP IP/OBS HIGH 50: CPT | Performed by: INTERNAL MEDICINE

## 2018-01-31 PROCEDURE — 80048 BASIC METABOLIC PNL TOTAL CA: CPT | Performed by: INTERNAL MEDICINE

## 2018-01-31 PROCEDURE — 92526 ORAL FUNCTION THERAPY: CPT

## 2018-01-31 RX ORDER — POTASSIUM CHLORIDE 14.9 MG/ML
20 INJECTION INTRAVENOUS
Status: COMPLETED | OUTPATIENT
Start: 2018-01-31 | End: 2018-01-31

## 2018-01-31 RX ADMIN — HEPARIN SODIUM 2000 UNITS: 1000 INJECTION, SOLUTION INTRAVENOUS; SUBCUTANEOUS at 13:07

## 2018-01-31 RX ADMIN — VALPROATE SODIUM 250 MG: 100 INJECTION, SOLUTION INTRAVENOUS at 22:13

## 2018-01-31 RX ADMIN — METOPROLOL TARTRATE 5 MG: 1 INJECTION, SOLUTION INTRAVENOUS at 23:40

## 2018-01-31 RX ADMIN — VALPROATE SODIUM 250 MG: 100 INJECTION, SOLUTION INTRAVENOUS at 10:28

## 2018-01-31 RX ADMIN — METOPROLOL TARTRATE 2.5 MG: 5 INJECTION, SOLUTION INTRAVENOUS at 10:33

## 2018-01-31 RX ADMIN — METOPROLOL TARTRATE 2.5 MG: 5 INJECTION, SOLUTION INTRAVENOUS at 16:07

## 2018-01-31 RX ADMIN — POTASSIUM CHLORIDE 20 MEQ: 200 INJECTION, SOLUTION INTRAVENOUS at 08:00

## 2018-01-31 RX ADMIN — LORAZEPAM 0.25 MG: 2 INJECTION, SOLUTION INTRAMUSCULAR; INTRAVENOUS at 23:39

## 2018-01-31 RX ADMIN — METOPROLOL TARTRATE 2.5 MG: 5 INJECTION, SOLUTION INTRAVENOUS at 22:00

## 2018-01-31 RX ADMIN — METOPROLOL TARTRATE 2.5 MG: 5 INJECTION, SOLUTION INTRAVENOUS at 03:30

## 2018-01-31 RX ADMIN — SODIUM CHLORIDE 1000 MG: 900 INJECTION INTRAVENOUS at 16:06

## 2018-01-31 RX ADMIN — POTASSIUM CHLORIDE 20 MEQ: 200 INJECTION, SOLUTION INTRAVENOUS at 10:34

## 2018-01-31 RX ADMIN — HEPARIN SODIUM AND DEXTROSE 13 UNITS/KG/HR: 10000; 5 INJECTION INTRAVENOUS at 16:06

## 2018-01-31 NOTE — PLAN OF CARE
DISCHARGE PLANNING     Discharge to home or other facility with appropriate resources Progressing        DISCHARGE PLANNING - CARE MANAGEMENT     Discharge to post-acute care or home with appropriate resources Progressing        GENITOURINARY - ADULT     Urinary catheter remains patent Progressing        INFECTION - ADULT     Absence or prevention of progression during hospitalization Progressing     Absence of fever/infection during neutropenic period Progressing        Knowledge Deficit     Patient/family/caregiver demonstrates understanding of disease process, treatment plan, medications, and discharge instructions Progressing        METABOLIC, FLUID AND ELECTROLYTES - ADULT     Fluid balance maintained Progressing        NEUROSENSORY - ADULT     Achieves stable or improved neurological status Progressing        Nutrition/Hydration-ADULT     Nutrient/Hydration intake appropriate for improving, restoring or maintaining nutritional needs Progressing        PAIN - ADULT     Verbalizes/displays adequate comfort level or baseline comfort level Progressing        Potential for Falls     Patient will remain free of falls Progressing        Prexisting or High Potential for Compromised Skin Integrity     Skin integrity is maintained or improved Progressing

## 2018-01-31 NOTE — PROGRESS NOTES
Dr Abe Roberson notified of pt's dropped/ low potassium per the labs this am  Will also pass this information on to the receiving RN during report off this am

## 2018-01-31 NOTE — PROGRESS NOTES
Progress Note - Infectious Disease   Jean Carlos Vicente [de-identified] y o  male MRN: [id number]  Unit/Bed#: -02 Encounter: [id number]    Assessment: 1  Sepsis/Lactic acidosis/Renal insufficiency/Obstructive uropathy/Pyuria/Left femoral CVC: [de-identified] yo male presenting from SNF w/ worsening mental status despite three days of empiric levofloxacin for cUTI/PNA   UA collected from old suprapubic catheter was notable for rufus pyuria and CXR with RLL linear atelectasis vs scaring and repeat CXR showed no active pulmonary disease   Suspect ESBL Klebsiella cUTI most likely etiology given suprapubic catheter and underwhelming findings on CXR   Urology has changed Sp catheter with repeat cultures growing <20K MRSA, ESBL Klebsiella, and Enterococcus  CT A/P showed thickened bladder wall though collapsed with a suprapubic catheter along with patchy bibasilar opacity with trace left pleural effusion     - Continue ertapenem for 7 day course until 18  Subjective/Objective   Chief Complaint: UTI    Subjective: remains afebrile  No acute events noted    Objective:     HR:  [] 81  Resp:  [20] 20  BP: (141-175)/(64-95) 166/77  SpO2:  [94 %-98 %] 98 %  Temp (24hrs), Av 7 °F (36 5 °C), Min:97 1 °F (36 2 °C), Max:98 °F (36 7 °C)  Current: Temperature: 97 7 °F (36 5 °C)    Physical Exam:   Elderly male, NAD  Not following commands  Intermittently screaming  VSS, Tmax:98  CARDIAC: RRR, no MRG, ICD pocket well-healed without erythema or warmth  LUNGS: ant fields w/ scattered rhonchi on RA  ABDOMEN: decreased BS, soft, SP catheter in place  EXTREMITIES: no edema  SKIN: no rash       Invasive Devices     Peripheral Intravenous Line            Peripheral IV 18 Left Forearm 1 day    Peripheral IV 18 Right Antecubital 1 day          Drain            Suprapubic Catheter 18 Fr  4 days                Lab, Imaging and other studies: I have personally reviewed pertinent reports

## 2018-01-31 NOTE — CASE MANAGEMENT
Continued Stay Review    Date:  1/31/2018    Vital Signs: /77   Pulse 81   Temp 97 7 °F (36 5 °C)   Resp 20   Ht 6' (1 829 m)   Wt 104 kg (229 lb 8 oz)   SpO2 98%   BMI 31 13 kg/m²     Medications:   Scheduled Meds:   Current Facility-Administered Medications:  acetaminophen 650 mg Oral Q6H PRN Kianna Mehta MD    ertapenem 1,000 mg Intravenous Q24H Colton Ross MD Last Rate: 1,000 mg (01/30/18 1723)   heparin (porcine) 3-20 Units/kg/hr (Order-Specific) Intravenous Titrated Kianna Madisonba MD Janine Last Rate: 11 Units/kg/hr (01/30/18 2119)   heparin (porcine) 2,000 Units Intravenous PRN Kianna Millstone MD Janine    heparin (porcine) 4,000 Units Intravenous PRN Kianna Madisonba Janine, MD    insulin lispro 1-6 Units Subcutaneous Q6H Esther Wong MD    LORazepam 0 25 mg Intravenous Q6H PRN Kianna Madisonba MD Janine    metoprolol 2 5 mg Intravenous Q6H Peter Camacho MD    metoprolol 5 mg Intravenous Q6H PRN Esther Wong MD    ondansetron 4 mg Intravenous Q6H PRN Kianna Mehta MD    IV infusion builder  Intravenous Continuous Lam Kilgore MD Last Rate: 75 mL/hr at 01/31/18 1034   valproate sodium 250 mg Intravenous Q12H Kianna Mehta MD Last Rate: 250 mg (01/31/18 1028)     Continuous Infusions:   heparin (porcine) 3-20 Units/kg/hr (Order-Specific) Last Rate: 11 Units/kg/hr (01/30/18 2119)   IV infusion builder  Last Rate: 75 mL/hr at 01/31/18 1034     PRN Meds: not used:    acetaminophen    heparin (porcine)    heparin (porcine)    LORazepam    metoprolol    ondansetron    Abnormal Labs/Diagnostic Results:   Na 149, K 3 3   Cl 116  Serial PTT_ @ q 3h 94;  77;  125;; 48    Age/Sex: [de-identified] y o  male     Assessment/Plan: UTI (urinary tract infection) due to urinary indwelling catheter (HCC)  Active Problems:    History of pulmonary embolus (PE)    Essential hypertension    Alzheimer's dementia without behavioral disturbance    VIVIAN (acute kidney injury) ([facility] [place] [address] )    Hypernatremia    Paroxysmal atrial fibrillation ([address] )  Resolved Problems:    Sepsis ([facility] [address] )    Hypotension    Encephalopathy, metabolic        Plan:  We appear to be in a holding pattern pending the results of speech evaluation      Continue current antibiotic regimen for urinary tract infection albeit low titer  Which not clear to me is whether not the patient does not have underlying pneumonia as unit because of his sepsis versus that of the urinary tract infection    Will repeat chest x-ray tomorrow as CT of the abdomen suggests left basilar infiltrate     Continue IV heparin for the patient has previous history of pulmonary emboli      Continue current IV fluids for hypernatremia    Discharge Plan:  Bed avaialble at [address] [place] [address]

## 2018-01-31 NOTE — PROGRESS NOTES
Progress Note - Onetha Ran [de-identified] y o  male MRN: [id number]    Unit/Bed#: -02 Encounter: [id number]      Assessment:    Principal Problem:    UTI (urinary tract infection) due to urinary indwelling catheter ([facility] [address] )  Active Problems:    History of pulmonary embolus (PE)    Essential hypertension    Alzheimer's dementia without behavioral disturbance    VIVIAN (acute kidney injury) ([facility] [address] )    Hypernatremia    Paroxysmal atrial fibrillation (HCC)  Resolved Problems:    Sepsis (HCC)    Hypotension    Encephalopathy, metabolic      Plan:  We appear to be in a holding pattern pending the results of speech evaluation  Continue current antibiotic regimen for urinary tract infection albeit low titer  Which not clear to me is whether not the patient does not have underlying pneumonia as unit because of his sepsis versus that of the urinary tract infection  Will repeat chest x-ray tomorrow as CT of the abdomen suggests left basilar infiltrate    Continue IV heparin for the patient has previous history of pulmonary emboli  Continue current IV fluids for hypernatremia    Subjective:   Calling out at time    Objective:     Vitals: Blood pressure (!) 175/71, pulse 71, temperature 98 °F (36 7 °C), resp  rate 20, height 6' (1 829 m), weight 104 kg (229 lb 8 oz), SpO2 95 %  ,Body mass index is 31 13 kg/m²        Intake/Output Summary (Last 24 hours) at 01/31/18 0920  Last data filed at 01/31/18 0000   Gross per 24 hour   Intake                0 ml   Output             2100 ml   Net            -2100 ml       Physical Exam:    Alert and awake in no acute distress  Head normocephalic, oral membranes are moist   Neck no JVD  Lungs few rales at bases  Heart atrial fibrillation  Abdomen soft, active bowel sounds, non-tender, non-distended  Extremities: no cyanosis, clubbing or edema          Invasive Devices     Peripheral Intravenous Line            Peripheral IV 01/29/18 Left Forearm 1 day    Peripheral IV 01/30/18 Right Antecubital 1 day          Drain            Suprapubic Catheter 18 Fr  4 days                            Lab, Imaging and other studies: I have personally reviewed pertinent reports  Results from last 7 days  Lab Units 01/30/18  0515 01/29/18 0444 01/28/18  0629   WBC Thousand/uL 5 13 6 41 7 97   HEMOGLOBIN g/dL 10 5* 10 8* 10 8*   HEMATOCRIT % 35 0* 36 2* 34 9*   PLATELETS Thousands/uL 219 210 200   LYMPHO PCT % 39 23 19   MONO PCT MAN % 5 5 1*   EOSINO PCT MANUAL % 3 2 0       Results from last 7 days  Lab Units 01/31/18 01/30/18  0515 01/29/18  0444  01/28/18  0629  01/27/18  0456 01/26/18  2219   SODIUM mmol/L 149* 149* 149*  < > 149*  < > 151* 149*   POTASSIUM mmol/L 3 3* 3 3* 3 4*  < > 3 3*  < > 3 1* 3 3*   CHLORIDE mmol/L 116* 117* 118*  < > 118*  < > 116* 110*   CO2 mmol/L 24 24 24  < > 22  < > 23 25   BUN mg/dL 13 16 22  < > 32*  < > 42* 43*   CREATININE mg/dL 1 25 1 26 1 44*  < > 1 71*  < > 2 73* 3 11*   CALCIUM mg/dL 7 5* 7 4* 7 3*  < > 7 2*  < > 7 2* 8 4   TOTAL PROTEIN g/dL  --   --   --   --  5 8*  --  6 0* 8 2   BILIRUBIN TOTAL mg/dL  --   --   --   --  0 20  --  0 20 0 30   ALK PHOS U/L  --   --   --   --  39*  --  42* 58   ALT U/L  --   --   --   --  28  --  35 53   AST U/L  --   --   --   --  47*  --  53* 74*   GLUCOSE RANDOM mg/dL 98 90 94  < > 103  < > 134 153*   < > = values in this interval not displayed  No results found for: TROPONINI, CKMB, CKTOTAL    Results from last 7 days  Lab Units 01/26/18  2219   INR  1 68*     Lab Results   Component Value Date    BLOODCX No Growth After 4 Days  01/26/2018    BLOODCX No Growth After 4 Days   01/26/2018    URINECX (A) 01/27/2018     10,000-19,000 cfu/ml Methicillin Resistant Staphylococcus aureus    URINECX 10,000-19,000 cfu/ml Klebsiella pneumoniae ESBL (A) 01/27/2018    URINECX 9504-4626 cfu/ml Enterococcus faecalis (A) 01/27/2018       Imaging:  Results for orders placed during the hospital encounter of 01/26/18   XR chest portable Narrative CHEST     INDICATION:  sepsis cough  History taken directly from the electronic ordering system  COMPARISON:  None    VIEWS:   AP frontal    IMAGES:  1    FINDINGS:  AICD/pacemaker is noted, leads overlie the right atrium and right ventricle  Heart size is not well evaluated on this single portable AP view  Right hemidiaphragm is mildly elevated  Linear atelectasis vs scarring is noted in the right lower lung zone  No pneumothorax or pleural effusion  Visualized osseous structures appear within normal limits for the patient's age  Impression Linear atelectasis vs scarring noted in the right lower lung zone  Workstation performed: [id number]       Results for orders placed during the hospital encounter of 01/26/18   XR chest pa & lateral    Narrative CHEST     INDICATION:  Cough    COMPARISON:  1/26/2018    VIEWS:  Frontal and lateral projections    IMAGES:  2    FINDINGS:  There is a stable pacer/ICD  Cardiomediastinal silhouette appears unremarkable  The lungs are clear  No pneumothorax or pleural effusion  Visualized osseous structures appear within normal limits for the patient's age  Impression No active pulmonary disease  Workstation performed: [id number]         PATIENT CENTERED ROUNDS: I have performed rounds with the nursing staff  This note has been constructed using a voice recognition system      Yazmin Wray MD

## 2018-01-31 NOTE — SPEECH THERAPY NOTE
Speech Language/Pathology    Speech/Language Pathology Progress Note    Patient Name: Allison WILDER Date: 1/31/2018     Problem List  Patient Active Problem List   Diagnosis    UTI (urinary tract infection) due to urinary indwelling catheter ([facility] [address] )    History of pulmonary embolus (PE)    Essential hypertension    Alzheimer's dementia without behavioral disturbance    VIVIAN (acute kidney injury) ([facility] [address] )    Hypernatremia    Paroxysmal atrial fibrillation (HCC)        Subjective:  Pt awake, alert, will answer w/ one word answers & follow intermittent simple commands  Current Diet: Puree, ht  Objective:  Pt seen for ongoing dx dysphagia tx  Order placed again for VBS  Pt w/ ongoing oral holding per nsg w/ need for cues to transfer & swallow mult times during meals  Pt trialed w/ 4 oz puree, 4 oz thin by straw, cup, nt by straw & ht by cup  Attempted 2 pieces of soft solids  Immed draw from the straw for thin w/ initial prompt transfers & prompt swallows  Pt then began to intermittently hold the material, cues were offered for him to swallow  Following 3 oz the pt had noted loss of the bolus prior to the swallow & presented w/ coughing  He is able to draw from the straw for nt, mult swallows noted & again intermittent holding  WFL w/ puree- adequate oral processing which again fluctuated  Soft solids placed in oral cavity- no attempt to manipulate  2nd one placed laterally in the oral cavity- pt that time was able to fully masticate & transfer  Assessment:  Oropharyngeal swallow is not consistent- at times his oral stage is timely, swallows are prompt & others there is holding & poor control w/ swallow delay  He tolerated puree, & nt w/ the most consistent swallow  Plan/Recommendations:  1  Continue puree, change liquids to nt  2   Speech to f/u

## 2018-02-01 PROBLEM — F03.91 SENILE DEMENTIA WITH BEHAVIORAL DISTURBANCE (HCC): Status: ACTIVE | Noted: 2018-02-01

## 2018-02-01 LAB
ANION GAP SERPL CALCULATED.3IONS-SCNC: 7 MMOL/L (ref 4–13)
APTT PPP: 50 SECONDS (ref 23–35)
APTT PPP: 78 SECONDS (ref 23–35)
BACTERIA BLD CULT: NORMAL
BACTERIA BLD CULT: NORMAL
BASOPHILS # BLD AUTO: 0.02 THOUSANDS/ΜL (ref 0–0.1)
BASOPHILS NFR BLD AUTO: 0 % (ref 0–1)
BUN SERPL-MCNC: 10 MG/DL (ref 5–25)
CALCIUM SERPL-MCNC: 7.4 MG/DL (ref 8.3–10.1)
CHLORIDE SERPL-SCNC: 113 MMOL/L (ref 100–108)
CO2 SERPL-SCNC: 26 MMOL/L (ref 21–32)
CREAT SERPL-MCNC: 1.18 MG/DL (ref 0.6–1.3)
EOSINOPHIL # BLD AUTO: 0.49 THOUSAND/ΜL (ref 0–0.61)
EOSINOPHIL NFR BLD AUTO: 7 % (ref 0–6)
ERYTHROCYTE [DISTWIDTH] IN BLOOD BY AUTOMATED COUNT: 15.8 % (ref 11.6–15.1)
GFR SERPL CREATININE-BSD FRML MDRD: 58 ML/MIN/1.73SQ M
GLUCOSE SERPL-MCNC: 131 MG/DL (ref 65–140)
GLUCOSE SERPL-MCNC: 85 MG/DL (ref 65–140)
GLUCOSE SERPL-MCNC: 95 MG/DL (ref 65–140)
HCT VFR BLD AUTO: 35.1 % (ref 36.5–49.3)
HGB BLD-MCNC: 10.7 G/DL (ref 12–17)
LYMPHOCYTES # BLD AUTO: 1.89 THOUSANDS/ΜL (ref 0.6–4.47)
LYMPHOCYTES NFR BLD AUTO: 28 % (ref 14–44)
MCH RBC QN AUTO: 26.2 PG (ref 26.8–34.3)
MCHC RBC AUTO-ENTMCNC: 30.5 G/DL (ref 31.4–37.4)
MCV RBC AUTO: 86 FL (ref 82–98)
MONOCYTES # BLD AUTO: 0.67 THOUSAND/ΜL (ref 0.17–1.22)
MONOCYTES NFR BLD AUTO: 10 % (ref 4–12)
NEUTROPHILS # BLD AUTO: 3.62 THOUSANDS/ΜL (ref 1.85–7.62)
NEUTS SEG NFR BLD AUTO: 55 % (ref 43–75)
PLATELET # BLD AUTO: 276 THOUSANDS/UL (ref 149–390)
PMV BLD AUTO: 10.8 FL (ref 8.9–12.7)
POTASSIUM SERPL-SCNC: 3.8 MMOL/L (ref 3.5–5.3)
RBC # BLD AUTO: 4.09 MILLION/UL (ref 3.88–5.62)
SODIUM SERPL-SCNC: 146 MMOL/L (ref 136–145)
WBC # BLD AUTO: 6.69 THOUSAND/UL (ref 4.31–10.16)

## 2018-02-01 PROCEDURE — 80048 BASIC METABOLIC PNL TOTAL CA: CPT | Performed by: INTERNAL MEDICINE

## 2018-02-01 PROCEDURE — 82948 REAGENT STRIP/BLOOD GLUCOSE: CPT

## 2018-02-01 PROCEDURE — 85730 THROMBOPLASTIN TIME PARTIAL: CPT | Performed by: INTERNAL MEDICINE

## 2018-02-01 PROCEDURE — 85025 COMPLETE CBC W/AUTO DIFF WBC: CPT | Performed by: INTERNAL MEDICINE

## 2018-02-01 PROCEDURE — 99232 SBSQ HOSP IP/OBS MODERATE 35: CPT | Performed by: INTERNAL MEDICINE

## 2018-02-01 RX ORDER — METOPROLOL SUCCINATE 50 MG/1
100 TABLET, EXTENDED RELEASE ORAL DAILY
Status: DISCONTINUED | OUTPATIENT
Start: 2018-02-01 | End: 2018-02-02 | Stop reason: HOSPADM

## 2018-02-01 RX ORDER — SERTRALINE HYDROCHLORIDE 100 MG/1
100 TABLET, FILM COATED ORAL DAILY
Status: DISCONTINUED | OUTPATIENT
Start: 2018-02-01 | End: 2018-02-02 | Stop reason: HOSPADM

## 2018-02-01 RX ORDER — FUROSEMIDE 10 MG/ML
80 INJECTION INTRAMUSCULAR; INTRAVENOUS ONCE
Status: COMPLETED | OUTPATIENT
Start: 2018-02-01 | End: 2018-02-01

## 2018-02-01 RX ORDER — FUROSEMIDE 80 MG
80 TABLET ORAL
Status: DISCONTINUED | OUTPATIENT
Start: 2018-02-01 | End: 2018-02-02 | Stop reason: HOSPADM

## 2018-02-01 RX ORDER — LISINOPRIL 10 MG/1
10 TABLET ORAL DAILY
Status: DISCONTINUED | OUTPATIENT
Start: 2018-02-01 | End: 2018-02-02 | Stop reason: HOSPADM

## 2018-02-01 RX ADMIN — VALPROATE SODIUM 250 MG: 100 INJECTION, SOLUTION INTRAVENOUS at 10:04

## 2018-02-01 RX ADMIN — FUROSEMIDE 80 MG: 10 INJECTION, SOLUTION INTRAMUSCULAR; INTRAVENOUS at 11:20

## 2018-02-01 RX ADMIN — ONDANSETRON 4 MG: 2 INJECTION INTRAMUSCULAR; INTRAVENOUS at 18:32

## 2018-02-01 RX ADMIN — HEPARIN SODIUM 2000 UNITS: 1000 INJECTION, SOLUTION INTRAVENOUS; SUBCUTANEOUS at 10:00

## 2018-02-01 RX ADMIN — FUROSEMIDE 80 MG: 80 TABLET ORAL at 17:55

## 2018-02-01 RX ADMIN — METOPROLOL SUCCINATE 100 MG: 50 TABLET, EXTENDED RELEASE ORAL at 17:55

## 2018-02-01 RX ADMIN — METOPROLOL TARTRATE 2.5 MG: 5 INJECTION, SOLUTION INTRAVENOUS at 10:04

## 2018-02-01 RX ADMIN — POTASSIUM CHLORIDE: 149 INJECTION, SOLUTION, CONCENTRATE INTRAVENOUS at 02:41

## 2018-02-01 RX ADMIN — SODIUM CHLORIDE 1000 MG: 900 INJECTION INTRAVENOUS at 17:51

## 2018-02-01 RX ADMIN — SERTRALINE 100 MG: 100 TABLET, FILM COATED ORAL at 11:20

## 2018-02-01 RX ADMIN — LISINOPRIL 10 MG: 10 TABLET ORAL at 11:20

## 2018-02-01 NOTE — PROGRESS NOTES
Progress Note - Pricila Lazo [de-identified] y o  male MRN: [id number]    Unit/Bed#: -02 Encounter: [id number]      Assessment:  Patient Active Problem List    Diagnosis Date Noted    UTI (urinary tract infection) due [facility] indwelling catheter [facility] ) 01/27/2018     Priority: A    Sepsis ([facility] ) 01/27/2018     Priority: B    VIVIAN (acute kidney injury) ([facility] ) 01/29/2018     Priority: C    Encephalopathy, metabolic 65/27/2258     Priority: D    Hypernatremia 01/29/2018     Priority: E    Paroxysmal atrial fibrillation ([facility] ) 01/30/2018     Priority: F    Senile dementia with behavioral disturbance 02/01/2018    Essential hypertension 01/29/2018    History of pulmonary embolus (PE) 01/28/2018       Plan:  Continue ertapenem for ESBL UTI due to indwelling catheter to February 4th  Patient passed swallowing evaluation tolerates dysphagia diet will switch his medications to p  O   Will stop IV heparin and will start p   O  Xarelto for history of PE and PAF  Patient is mildly fluid overloaded today I will give him Lasix 80 mg IV x1 and resume his p o  Lasix for his chronic diastolic CHF    Will ask case management to see if the nursing home requires a PICC line placement for 2 days of IV ertapenem versus the peripheral line can be left in  Patient has Alzheimer dementia with behavior problems are at baseline I discussed with patient's daughter at the bedside in detail    Plan is to discharge patient tomorrow to the nursing home    Subjective:   Patient denies being in pain denies nausea  Poor historian due to dementia  Patient's nurse reports that patient tolerates dysphagia diet without any coughing or choking  Patient has wheezing on exam   Had no vomiting, diarrhea, signs of bleeding      All other ROS are negative  Objective:     Vitals: Blood pressure (!) 178/83, pulse 67, temperature 98 4 °F (36 9 °C), temperature source Oral, resp   rate 18, height 6' (1 829 m), weight 111 kg (244 lb 14 9 oz), SpO2 96 %  ,Body mass index is 33 22 kg/m²  Intake/Output Summary (Last 24 hours) at 02/01/18 1049  Last data filed at 02/01/18 0241   Gross per 24 hour   Intake          1463 75 ml   Output              250 ml   Net          1213 75 ml       Physical Exam:    Alert and awake in no acute respiratory distress  Head normocephalic, PERRLA   Oral membranes are moist,  Neck supple, no lymphadenopathy  Lungs expiratory wheezing is heard bilaterally  Heart regular rate and rythm, distant heart sounds  Abdomen soft, active bowel sounds, non-tender, non-distended suprapubic catheter is present without cellulitis around it  Extremities: there is no joint effusion or warmth  Skin: warm, no hives seen, no cellulitis seen trace pedal edema seen  Neuro: no facial droop, tongue is midline,        Lab, Imaging and other studies: I have personally reviewed pertinent reports  See below         Current Facility-Administered Medications   Medication Dose Route Frequency    acetaminophen (TYLENOL) tablet 650 mg  650 mg Oral Q6H PRN    ertapenem (INVanz) 1,000 mg in sodium chloride 0 9 % 50 mL IVPB  1,000 mg Intravenous Q24H    furosemide (LASIX) injection 80 mg  80 mg Intravenous Once    furosemide (LASIX) tablet 80 mg  80 mg Oral BID (diuretic)    insulin lispro (HumaLOG) 100 units/mL subcutaneous injection 1-6 Units  1-6 Units Subcutaneous Q6H    lisinopril (ZESTRIL) tablet 10 mg  10 mg Oral Daily    LORazepam (ATIVAN) 2 mg/mL injection 0 25 mg  0 25 mg Intravenous Q6H PRN    metoprolol (LOPRESSOR) injection 5 mg  5 mg Intravenous Q6H PRN    metoprolol succinate (TOPROL-XL) 24 hr tablet 100 mg  100 mg Oral Daily    ondansetron (ZOFRAN) injection 4 mg  4 mg Intravenous Q6H PRN    [START ON 2/2/2018] rivaroxaban (XARELTO) tablet 20 mg  20 mg Oral Daily With Breakfast    sertraline (ZOLOFT) tablet 100 mg  100 mg Oral Daily       Admission on 01/26/2018   No results displayed because visit has over 200 results  Prior to Admission Medications   Prescriptions Last Dose Informant Patient Reported? Taking?    LORazepam (ATIVAN) 0 5 mg tablet   Yes Yes   Sig: Take 0 5 mg by mouth 3 (three) times a day   Melatonin 5 MG CAPS   Yes Yes   Sig: Take 5 mg by mouth   acetaminophen (TYLENOL) 325 mg tablet   Yes Yes   Sig: Take 650 mg by mouth every 6 (six) hours as needed for mild pain   atorvastatin (LIPITOR) 10 mg tablet   Yes Yes   Sig: Take 10 mg by mouth daily   cholecalciferol (VITAMIN D3) 1,000 units tablet   Yes Yes   Sig: Take 1,000 Units by mouth daily   diltiazem (DILACOR XR) 120 MG 24 hr capsule   Yes Yes   Sig: Take 120 mg by mouth daily   divalproex sodium (DEPAKOTE) 250 mg EC tablet   Yes Yes   Sig: Take 250 mg by mouth 2 (two) times a day   ferrous sulfate 325 (65 Fe) mg tablet   Yes Yes   Sig: Take 325 mg by mouth daily with breakfast   furosemide (LASIX) 80 mg tablet   Yes Yes   Sig: Take 80 mg by mouth 2 (two) times a day   hydrALAZINE (APRESOLINE) 50 mg tablet   Yes Yes   Sig: Take 50 mg by mouth 3 (three) times a day   lisinopril (ZESTRIL) 40 mg tablet   Yes Yes   Sig: Take 40 mg by mouth 2 (two) times a day   magnesium hydroxide (MILK OF MAGNESIA) 400 mg/5 mL oral suspension   Yes Yes   Sig: Take 30 mL by mouth daily as needed for constipation   metoprolol succinate (TOPROL-XL) 100 mg 24 hr tablet   Yes Yes   Sig: Take 100 mg by mouth daily   potassium chloride (K-DUR,KLOR-CON) 20 mEq tablet   Yes Yes   Sig: Take 20 mEq by mouth 2 (two) times a day   risperiDONE (RisperDAL) 0 25 mg tablet   Yes Yes   Sig: Take 0 25 mg by mouth 2 (two) times a day   rivaroxaban (XARELTO) 20 mg tablet   Yes Yes   Sig: Take 20 mg by mouth   sertraline (ZOLOFT) 100 mg tablet   Yes Yes   Sig: Take 50 mg by mouth daily      Facility-Administered Medications: None         Kellen Wu MD

## 2018-02-01 NOTE — PROGRESS NOTES
Progress Note - Infectious Disease   Eliana James [de-identified] y o  male MRN: [id number]  Unit/Bed#: -02 Encounter: [id number]    Assessment: 1  Sepsis/Lactic acidosis/Renal insufficiency/Obstructive uropathy/Pyuria/Left femoral CVC: [de-identified] yo male presenting from SNF w/ worsening mental status despite three days of empiric levofloxacin for cUTI/PNA  Francisco Benson collected from old suprapubic catheter was notable for rufus pyuria and CXR with RLL linear atelectasis vs scaring and repeat CXR showed no active pulmonary disease   Suspect ESBL Klebsiella cUTI most likely etiology given suprapubic catheter and underwhelming findings on CXR   Urology has changed Sp catheter with repeat cultures growing <20K MRSA, ESBL Klebsiella, and Enterococcus  CT A/P showed thickened bladder wall though collapsed with a suprapubic catheter along with patchy bibasilar opacity with trace left pleural effusion     - Continue ertapenem for 3 more days to complete 7 day course until 18  - Will sign off  Please call back with any questions        Subjective/Objective   Chief Complaint: ESBL Klebsiella UTI    Subjective: remains afebrile and denies any pain  A little more verbal today answering yes/no  Objective:     HR:  [66-90] 67  Resp:  [18-20] 18  BP: (154-198)/(72-90) 178/83  SpO2:  [92 %-96 %] 96 %  Temp (24hrs), Av 8 °F (37 1 °C), Min:98 4 °F (36 9 °C), Max:99 °F (37 2 °C)  Current: Temperature: 98 4 °F (36 9 °C)    Physical Exam:  Elderly male, NAD  Not following commands   Intermittently screaming  VSS, Tmax:98  CARDIAC: RRR, no MRG, ICD pocket well-healed without erythema or warmth  LUNGS: ant fields w/ scattered rhonchi on RA  ABDOMEN: decreased BS, soft, SP catheter in place  EXTREMITIES: no edema  SKIN: no rash       Invasive Devices     Peripheral Intravenous Line            Peripheral IV 18 Left Forearm 2 days    Peripheral IV 18 Left Hand less than 1 day          Drain            Suprapubic Catheter 18 Fr  5 days Lab, Imaging and other studies: I have personally reviewed pertinent reports

## 2018-02-01 NOTE — PROGRESS NOTES
1830  Pt was being fed his dinner by Charity RUFFIN when Pt started to vomit and dry heave and heard coughing  Placed pt in nancy-fowlers position, gave pt Zofran (see MAR), yellow sputum cleaned off gown and chin  Will leave in [facility] for 45mins and monitor closely  1840  Pt sitting high-fowlers appears to be resting comfortably, eyes closed, bilateral, equal chest rise and fall, no grimace, no cough

## 2018-02-02 VITALS
RESPIRATION RATE: 22 BRPM | BODY MASS INDEX: 31.35 KG/M2 | SYSTOLIC BLOOD PRESSURE: 174 MMHG | DIASTOLIC BLOOD PRESSURE: 84 MMHG | HEART RATE: 64 BPM | HEIGHT: 72 IN | TEMPERATURE: 97.9 F | WEIGHT: 231.48 LBS | OXYGEN SATURATION: 93 %

## 2018-02-02 LAB
GLUCOSE SERPL-MCNC: 105 MG/DL (ref 65–140)
GLUCOSE SERPL-MCNC: 80 MG/DL (ref 65–140)
GLUCOSE SERPL-MCNC: 96 MG/DL (ref 65–140)

## 2018-02-02 PROCEDURE — 92526 ORAL FUNCTION THERAPY: CPT

## 2018-02-02 PROCEDURE — 99238 HOSP IP/OBS DSCHRG MGMT 30/<: CPT | Performed by: INTERNAL MEDICINE

## 2018-02-02 PROCEDURE — 82948 REAGENT STRIP/BLOOD GLUCOSE: CPT

## 2018-02-02 RX ORDER — LISINOPRIL 10 MG/1
10 TABLET ORAL DAILY
Qty: 30 TABLET | Refills: 0 | Status: SHIPPED | OUTPATIENT
Start: 2018-02-03

## 2018-02-02 RX ADMIN — RIVAROXABAN 20 MG: 20 TABLET, FILM COATED ORAL at 09:06

## 2018-02-02 RX ADMIN — LISINOPRIL 10 MG: 10 TABLET ORAL at 09:06

## 2018-02-02 RX ADMIN — SODIUM CHLORIDE 1000 MG: 900 INJECTION INTRAVENOUS at 15:35

## 2018-02-02 RX ADMIN — FUROSEMIDE 80 MG: 80 TABLET ORAL at 09:06

## 2018-02-02 RX ADMIN — METOPROLOL SUCCINATE 100 MG: 50 TABLET, EXTENDED RELEASE ORAL at 09:06

## 2018-02-02 RX ADMIN — SERTRALINE 100 MG: 100 TABLET, FILM COATED ORAL at 09:06

## 2018-02-02 RX ADMIN — FUROSEMIDE 80 MG: 80 TABLET ORAL at 15:35

## 2018-02-02 NOTE — DISCHARGE SUMMARY
Discharge Summary - Cayetano Benson [de-identified] y o  male MRN: [id number]    Unit/Bed#: [address] [place] [address] Encounter: [id number]    Admission Date: 1/26/2018     Admitting Diagnosis: Pneumonia [J18 9]  Fever [R50 9]  Acute UTI [N39 0]  Severe sepsis ([address] ) [A41 9, R65 20]  UTI (urinary tract infection) due to urinary indwelling catheter ([facility] ) [T83 511A, N39 0]    HPI: Please refer to H+P on the chart! Procedures Performed:   Orders Placed This Encounter   Procedures   [facility] ED ECG Documentation Only       Hospital Course:  Patient admitted from the nursing home for acute encephalopathy caused by a ESBL producing Klebsiella urinary tract infection setting of suprapubic catheter  Patient is placed on IV fluids and IV antibiotics  He had acute kidney injury on admission that got better with IV hydration and his creatinine on discharge is 1 18  He had a prolonged period of encephalopathy during which he was unable to take p  O  medications or feedings  Eventually his mentation returned to baseline  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     Complications: none    Discharge Diagnosis:   Patient Active Problem List    Diagnosis Date Noted    UTI (urinary tract infection) due to urinary indwelling catheter ([facility] ) 01/27/2018     Priority: A    Sepsis ([facility] ) 01/27/2018     Priority: B    VIVIAN (acute kidney injury) ([facility] [address] ) 01/29/2018     Priority: C    Encephalopathy, metabolic 46/81/6946     Priority: D    Hypernatremia 01/29/2018     Priority: E    Paroxysmal atrial fibrillation ([facility] ) 01/30/2018     Priority: F    Senile dementia with behavioral disturbance 02/01/2018  Essential hypertension 01/29/2018    History of pulmonary embolus (PE) 01/28/2018         Condition at Discharge: stable     Discharge instructions/Information to patient and family:   See after visit summary for information provided to patient and family  Provisions for Follow-Up Care:  See after visit summary for information related to follow-up care and any pertinent home health orders  Disposition: Skilled nursing facility at Nursing home    Planned Readmission: No    Discharge Statement    I had direct contact with the patient on the day of discharge  Discharge Medications:  See after visit summary for reconciled discharge medications provided to patient and family        Denise White MD

## 2018-02-02 NOTE — PROGRESS NOTES
Attempted to call report [facility] several times  Left message on machine  Will wait for return call

## 2018-02-02 NOTE — SPEECH THERAPY NOTE
Speech Language/Pathology    Speech/Language Pathology Progress Note    Patient Name: Jean Carlos PARSONS Date: 2/2/2018     Problem List  Patient Active Problem List   Diagnosis    Sepsis ([facility] )    UTI (urinary tract infection) due to urinary indwelling catheter ([facility] )    History of pulmonary embolus (PE)    Essential hypertension    Encephalopathy, metabolic    VIVIAN (acute kidney injury) ([facility] [facility] )    Hypernatremia    [facility]    Senile dementia with behavioral disturbance        Past Medical History  History reviewed  No pertinent past medical history  Past Surgical History  History reviewed  No pertinent surgical history  Subjective:  Pt seen for dysphagia tx  Pt yelling out "hello" very loudly, over and over  Objective:  A pureed diet and NTL were recommended after previous dysphagia tx, however, diet order is for pureed and HTL  Pt was fed lunch (pureed and HTL)  Pt intermittently would turn away from the spoon, but then would take a bite  He ate approximately 1/3 of the lunch before yelling "no" loudly, and refused to eat more  Bolus manipulation of the pureed foods was slow, with suspected premature spill into pharynx  Swallows appear timely to mildly delayed  With 4 oz HTL, pt drank by cup; with each sip he would swish the liquid around his oral cavity for up to 15 seconds prior to swallowing  Suspect premature spill into pharynx  Results were the same with 2 oz trial of NTL by cup  There were no clinical s/s of aspiration with pureed, HTL or with NTL by cup, nor with ice chips  However, with a small cup sip of water, pt again swished the bolus around his mouth for an extended amount of time, and when he finally initiated a pharyngeal swallow there was a strong, immediate cough  Assessment:  Pt appears to be tolerating pureed diet, and HTL and NTL  No significant oral holding of pureed foods, but oral holding of liquids noted   Clinical s/s of aspiration were present with trials of thin liquids  Reduced cooperation during meals/ dysphagia tx  Plan/Recommendations:  Pt is being discharged to [address] [place] [address],[address] NH today, may benefit from follow up by ST if he will participate  Continue puree/NTL diet

## 2018-02-06 NOTE — TELEPHONE ENCOUNTER
Contacted Aida at [facility] - She wanted to be able to change SPT they  We have Rama Plummer s/jose'd for appt 3/2    Then after that Dr Rabia Basilio said he can have his SPT changed at the facility

## 2018-02-08 ENCOUNTER — APPOINTMENT (EMERGENCY)
Dept: RADIOLOGY | Facility: HOSPITAL | Age: 83
DRG: 682 | End: 2018-02-08
Payer: MEDICARE

## 2018-02-08 ENCOUNTER — HOSPITAL ENCOUNTER (INPATIENT)
Facility: HOSPITAL | Age: 83
LOS: 5 days | Discharge: NURSING, REHAB OR LONG-TERM CARE | DRG: 682 | End: 2018-02-13
Attending: EMERGENCY MEDICINE | Admitting: HOSPITALIST
Payer: MEDICARE

## 2018-02-08 DIAGNOSIS — N17.9 ACUTE RENAL FAILURE (ARF) (HCC): Primary | ICD-10-CM

## 2018-02-08 DIAGNOSIS — I50.32 CHRONIC DIASTOLIC CONGESTIVE HEART FAILURE (HCC): ICD-10-CM

## 2018-02-08 DIAGNOSIS — A41.9 SEPSIS, DUE TO UNSPECIFIED ORGANISM: ICD-10-CM

## 2018-02-08 PROBLEM — R41.82 ALTERED MENTAL STATUS: Status: ACTIVE | Noted: 2018-02-08

## 2018-02-08 LAB
ALBUMIN SERPL BCP-MCNC: 2.1 G/DL (ref 3.5–5)
ALP SERPL-CCNC: 75 U/L (ref 46–116)
ALT SERPL W P-5'-P-CCNC: 31 U/L (ref 12–78)
ANION GAP SERPL CALCULATED.3IONS-SCNC: 9 MMOL/L (ref 4–13)
AST SERPL W P-5'-P-CCNC: 24 U/L (ref 5–45)
BACTERIA UR QL AUTO: ABNORMAL /HPF
BASOPHILS # BLD AUTO: 0.01 THOUSANDS/ΜL (ref 0–0.1)
BASOPHILS NFR BLD AUTO: 0 % (ref 0–1)
BILIRUB SERPL-MCNC: 0.3 MG/DL (ref 0.2–1)
BILIRUB UR QL STRIP: ABNORMAL
BUN SERPL-MCNC: 39 MG/DL (ref 5–25)
CALCIUM SERPL-MCNC: 8.5 MG/DL (ref 8.3–10.1)
CAOX CRY URNS QL MICRO: ABNORMAL /HPF
CHLORIDE SERPL-SCNC: 111 MMOL/L (ref 100–108)
CLARITY UR: ABNORMAL
CO2 SERPL-SCNC: 29 MMOL/L (ref 21–32)
COARSE GRAN CASTS URNS QL MICRO: ABNORMAL /LPF
COLOR UR: YELLOW
CREAT SERPL-MCNC: 3.65 MG/DL (ref 0.6–1.3)
EOSINOPHIL # BLD AUTO: 0.2 THOUSAND/ΜL (ref 0–0.61)
EOSINOPHIL NFR BLD AUTO: 2 % (ref 0–6)
ERYTHROCYTE [DISTWIDTH] IN BLOOD BY AUTOMATED COUNT: 16.7 % (ref 11.6–15.1)
GFR SERPL CREATININE-BSD FRML MDRD: 15 ML/MIN/1.73SQ M
GLUCOSE SERPL-MCNC: 109 MG/DL (ref 65–140)
GLUCOSE UR STRIP-MCNC: NEGATIVE MG/DL
HCT VFR BLD AUTO: 39.9 % (ref 36.5–49.3)
HGB BLD-MCNC: 12.1 G/DL (ref 12–17)
HGB UR QL STRIP.AUTO: ABNORMAL
HYALINE CASTS #/AREA URNS LPF: ABNORMAL /LPF
KETONES UR STRIP-MCNC: ABNORMAL MG/DL
LEUKOCYTE ESTERASE UR QL STRIP: ABNORMAL
LYMPHOCYTES # BLD AUTO: 1.61 THOUSANDS/ΜL (ref 0.6–4.47)
LYMPHOCYTES NFR BLD AUTO: 14 % (ref 14–44)
MCH RBC QN AUTO: 27.1 PG (ref 26.8–34.3)
MCHC RBC AUTO-ENTMCNC: 30.3 G/DL (ref 31.4–37.4)
MCV RBC AUTO: 90 FL (ref 82–98)
MONOCYTES # BLD AUTO: 0.54 THOUSAND/ΜL (ref 0.17–1.22)
MONOCYTES NFR BLD AUTO: 5 % (ref 4–12)
NEUTROPHILS # BLD AUTO: 9.26 THOUSANDS/ΜL (ref 1.85–7.62)
NEUTS SEG NFR BLD AUTO: 79 % (ref 43–75)
NITRITE UR QL STRIP: NEGATIVE
NON-SQ EPI CELLS URNS QL MICRO: ABNORMAL /HPF
OTHER CASTS: ABNORMAL
OTHER STN SPEC: ABNORMAL
PH UR STRIP.AUTO: 5.5 [PH] (ref 4.5–8)
PLATELET # BLD AUTO: 375 THOUSANDS/UL (ref 149–390)
PMV BLD AUTO: 10.6 FL (ref 8.9–12.7)
POTASSIUM SERPL-SCNC: 5.2 MMOL/L (ref 3.5–5.3)
PROT SERPL-MCNC: 7.4 G/DL (ref 6.4–8.2)
PROT UR STRIP-MCNC: ABNORMAL MG/DL
RBC # BLD AUTO: 4.46 MILLION/UL (ref 3.88–5.62)
RBC #/AREA URNS AUTO: ABNORMAL /HPF
SODIUM SERPL-SCNC: 149 MMOL/L (ref 136–145)
SP GR UR STRIP.AUTO: 1.02 (ref 1–1.03)
TROPONIN I SERPL-MCNC: 0.03 NG/ML
UROBILINOGEN UR QL STRIP.AUTO: 0.2 E.U./DL
VALPROATE SERPL-MCNC: 12 UG/ML (ref 50–100)
WBC # BLD AUTO: 11.62 THOUSAND/UL (ref 4.31–10.16)
WBC #/AREA URNS AUTO: ABNORMAL /HPF
WBC CASTS URNS QL MICRO: ABNORMAL /LPF
WBC CLUMPS # UR AUTO: PRESENT /UL

## 2018-02-08 PROCEDURE — 71045 X-RAY EXAM CHEST 1 VIEW: CPT

## 2018-02-08 PROCEDURE — 85025 COMPLETE CBC W/AUTO DIFF WBC: CPT | Performed by: EMERGENCY MEDICINE

## 2018-02-08 PROCEDURE — 80053 COMPREHEN METABOLIC PANEL: CPT | Performed by: EMERGENCY MEDICINE

## 2018-02-08 PROCEDURE — 99285 EMERGENCY DEPT VISIT HI MDM: CPT

## 2018-02-08 PROCEDURE — 93005 ELECTROCARDIOGRAM TRACING: CPT

## 2018-02-08 PROCEDURE — 96360 HYDRATION IV INFUSION INIT: CPT

## 2018-02-08 PROCEDURE — 99223 1ST HOSP IP/OBS HIGH 75: CPT | Performed by: INTERNAL MEDICINE

## 2018-02-08 PROCEDURE — 87086 URINE CULTURE/COLONY COUNT: CPT | Performed by: EMERGENCY MEDICINE

## 2018-02-08 PROCEDURE — 81001 URINALYSIS AUTO W/SCOPE: CPT | Performed by: EMERGENCY MEDICINE

## 2018-02-08 PROCEDURE — 84484 ASSAY OF TROPONIN QUANT: CPT | Performed by: EMERGENCY MEDICINE

## 2018-02-08 PROCEDURE — 80164 ASSAY DIPROPYLACETIC ACD TOT: CPT | Performed by: EMERGENCY MEDICINE

## 2018-02-08 PROCEDURE — 96361 HYDRATE IV INFUSION ADD-ON: CPT

## 2018-02-08 PROCEDURE — 36415 COLL VENOUS BLD VENIPUNCTURE: CPT | Performed by: EMERGENCY MEDICINE

## 2018-02-08 RX ORDER — ACETAMINOPHEN 325 MG/1
650 TABLET ORAL EVERY 6 HOURS PRN
Status: DISCONTINUED | OUTPATIENT
Start: 2018-02-08 | End: 2018-02-13 | Stop reason: HOSPADM

## 2018-02-08 RX ORDER — MELATONIN
1000 DAILY
Status: DISCONTINUED | OUTPATIENT
Start: 2018-02-09 | End: 2018-02-13 | Stop reason: HOSPADM

## 2018-02-08 RX ORDER — SODIUM CHLORIDE 450 MG/100ML
100 INJECTION, SOLUTION INTRAVENOUS CONTINUOUS
Status: DISCONTINUED | OUTPATIENT
Start: 2018-02-08 | End: 2018-02-09

## 2018-02-08 RX ORDER — ATORVASTATIN CALCIUM 10 MG/1
10 TABLET, FILM COATED ORAL DAILY
Status: DISCONTINUED | OUTPATIENT
Start: 2018-02-09 | End: 2018-02-13 | Stop reason: HOSPADM

## 2018-02-08 RX ORDER — DIVALPROEX SODIUM 250 MG/1
250 TABLET, DELAYED RELEASE ORAL 2 TIMES DAILY
Status: DISCONTINUED | OUTPATIENT
Start: 2018-02-08 | End: 2018-02-13 | Stop reason: HOSPADM

## 2018-02-08 RX ORDER — SODIUM CHLORIDE 9 MG/ML
100 INJECTION, SOLUTION INTRAVENOUS CONTINUOUS
Status: DISCONTINUED | OUTPATIENT
Start: 2018-02-08 | End: 2018-02-08

## 2018-02-08 RX ORDER — FERROUS SULFATE 325(65) MG
325 TABLET ORAL
Status: DISCONTINUED | OUTPATIENT
Start: 2018-02-09 | End: 2018-02-13 | Stop reason: HOSPADM

## 2018-02-08 RX ADMIN — SODIUM CHLORIDE 100 ML/HR: 0.45 INJECTION, SOLUTION INTRAVENOUS at 20:47

## 2018-02-08 RX ADMIN — DIVALPROEX SODIUM 250 MG: 250 TABLET, DELAYED RELEASE ORAL at 20:48

## 2018-02-08 RX ADMIN — SODIUM CHLORIDE 1000 ML: 0.9 INJECTION, SOLUTION INTRAVENOUS at 16:28

## 2018-02-08 RX ADMIN — SODIUM CHLORIDE 1000 ML: 0.9 INJECTION, SOLUTION INTRAVENOUS at 15:34

## 2018-02-08 NOTE — ED NOTES
Daughter at bedside  Daughter updated on patient status  Will continue to monitor        Kathy Brunner RN  02/08/18 [id number]

## 2018-02-08 NOTE — ED NOTES
Patient appears to be sleeping  Symmetrical chest rise, no facial grimace, and comfortable position noted       Nelly Leavitt RN  02/08/18 9857

## 2018-02-08 NOTE — ED PROVIDER NOTES
History  Chief Complaint   Patient presents with    Altered Mental Status     To ED via EMS for evaluation or worsening lethargy and "decline" per staff  Patient has multiple chronic medical conditions  Brought in from NH for AMS  Notes worsening lethargy and more confused than usual  Hx of demential  Recently d/c from hospital after being txd for complicated uti (mrsa/esbl w/ suprapubic catheter) and prolonged encephalopathy  Finished abx at [facility] and well for a few days  Per daughter, had risperdal and pain meds increased due to agitation earlier in the week  In ED, pt awake and alert, oriented to self  Has no complaint  Noted to be hypotensive, but not tachycardic or febrile  History provided by:  Patient, EMS personnel and relative  History limited by:  Dementia   used: No    Altered Mental Status   Presenting symptoms: behavior changes and lethargy    Severity:  Moderate  Most recent episode: Today  Episode history:  Continuous  Timing:  Constant  Progression:  Worsening  Chronicity:  Recurrent  Context: nursing home resident and recent change in medication    Associated symptoms: no abdominal pain, no agitation, no difficulty breathing, no fever, no hallucinations, no slurred speech and no vomiting        Prior to Admission Medications   Prescriptions Last Dose Informant Patient Reported?  Taking?   acetaminophen (TYLENOL) 325 mg tablet   Yes No   Sig: Take 650 mg by mouth every 6 (six) hours as needed for mild pain   atorvastatin (LIPITOR) 10 mg tablet   Yes No   Sig: Take 10 mg by mouth daily   cholecalciferol (VITAMIN D3) 1,000 units tablet   Yes No   Sig: Take 1,000 Units by mouth daily   diltiazem (DILACOR XR) 120 MG 24 hr capsule   Yes No   Sig: Take 120 mg by mouth daily   divalproex sodium (DEPAKOTE) 250 mg EC tablet   Yes No   Sig: Take 250 mg by mouth 2 (two) times a day   ferrous sulfate 325 (65 Fe) mg tablet   Yes No   Sig: Take 325 mg by mouth daily with breakfast   furosemide (LASIX) 80 mg tablet   Yes No   Sig: Take 80 mg by mouth 2 (two) times a day   hydrALAZINE (APRESOLINE) 50 mg tablet   Yes No   Sig: Take 50 mg by mouth 3 (three) times a day   lisinopril (ZESTRIL) 10 mg tablet   No No   Sig: Take 1 tablet (10 mg total) by mouth daily   metoprolol succinate (TOPROL-XL) 100 mg 24 hr tablet   Yes No   Sig: Take 100 mg by mouth daily   potassium chloride (K-DUR,KLOR-CON) 20 mEq tablet   Yes No   Sig: Take 20 mEq by mouth 2 (two) times a day   risperiDONE (RisperDAL) 0 25 mg tablet   Yes No   Sig: Take 0 25 mg by mouth 2 (two) times a day   rivaroxaban (XARELTO) 20 mg tablet   Yes No   Sig: Take 20 mg by mouth   sertraline (ZOLOFT) 100 mg tablet   Yes No   Sig: Take 50 mg by mouth daily      Facility-Administered Medications: None       Past Medical History:   Diagnosis Date    Abdominal aortic aneurysm (AAA) (HCC)     Anemia     Atrial fibrillation (HCC)     Cardiac disease     Dementia     Diabetes mellitus (HCC)     Encephalopathy     Hyperlipidemia     Hypertension     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     Psychiatric disorder     Renal disorder     UTI (urinary tract infection)        Past Surgical History:   Procedure Laterality Date    CARDIAC DEFIBRILLATOR PLACEMENT         History reviewed  No pertinent family history  I have reviewed and agree with the history as documented  Social History   Substance Use Topics    Smoking status: Former Smoker    Smokeless tobacco: Never Used    Alcohol use No        Review of Systems   Unable to perform ROS: Dementia   Constitutional: Negative for fever  Gastrointestinal: Negative for abdominal pain and vomiting  Psychiatric/Behavioral: Negative for agitation and hallucinations         Physical Exam  ED Triage Vitals [02/08/18 1522]   Temperature Pulse Respirations Blood Pressure SpO2   98 °F (36 7 °C) 69 20 102/53 96 %      Temp Source Heart Rate Source Patient Position - Orthostatic VS BP Location FiO2 (%)   Temporal Monitor Lying Left arm --      Pain Score       No Pain           Orthostatic Vital Signs  Vitals:    02/08/18 1800 02/08/18 1815 02/08/18 1830 02/08/18 2010   BP: 107/53 116/69 120/68 130/59   Pulse: 63 61 64 73   Patient Position - Orthostatic VS: Sitting Sitting Sitting Lying       Physical Exam   Constitutional: He appears well-developed and well-nourished  HENT:   Nose: Nose normal    Mouth/Throat: Mucous membranes are dry  Eyes: Conjunctivae are normal    Neck: Neck supple  Cardiovascular: Normal rate and regular rhythm  Pulmonary/Chest: Effort normal and breath sounds normal    Abdominal: Soft  Bowel sounds are normal    Musculoskeletal: He exhibits no deformity  Neurological: He is alert  Skin: Skin is warm  There is pallor  Psychiatric: He has a normal mood and affect  Nursing note and vitals reviewed  ED Medications  Medications   acetaminophen (TYLENOL) tablet 650 mg (not administered)   atorvastatin (LIPITOR) tablet 10 mg (not administered)   cholecalciferol (VITAMIN D3) tablet 1,000 Units (not administered)   divalproex sodium (DEPAKOTE) EC tablet 250 mg (not administered)   ferrous sulfate tablet 325 mg (not administered)   rivaroxaban (XARELTO) tablet 20 mg (not administered)   sertraline (ZOLOFT) tablet 50 mg (not administered)   sodium chloride 0 9 % infusion (not administered)   sodium chloride 0 9 % bolus 1,000 mL (0 mL Intravenous Stopped 2/8/18 1700)   sodium chloride 0 9 % bolus 1,000 mL (0 mL Intravenous Stopped 2/8/18 1816)       Diagnostic Studies  Results Reviewed     Procedure Component Value Units Date/Time    Urine culture [[id number]] Collected:  02/08/18 1618    Lab Status:   In process Specimen:  Urine from Urine, Clean Catch Updated:  02/08/18 1707    Troponin I [[id number]]  (Normal) Collected:  02/08/18 1530    Lab Status:  Final result Specimen:  Blood from Arm, Left Updated:  02/08/18 1650     Troponin I 0 03 ng/mL     Narrative:         [employer] Chemistry analyzer 99% cutoff is > 0 04 ng/mL in network labs    o cTnI 99% cutoff is useful only when applied to patients in the clinical setting of myocardial ischemia  o cTnI 99% cutoff should be interpreted in the context of clinical history, ECG findings and possibly cardiac imaging to establish correct diagnosis  o cTnI 99% cutoff may be suggestive but clearly not indicative of a coronary event without the clinical setting of myocardial ischemia  Comprehensive metabolic panel [[id number]]  (Abnormal) Collected:  02/08/18 1530    Lab Status:  Final result Specimen:  Blood from Arm, Left Updated:  02/08/18 1647     Sodium 149 (H) mmol/L      Potassium 5 2 mmol/L      Chloride 111 (H) mmol/L      CO2 29 mmol/L      Anion Gap 9 mmol/L      BUN 39 (H) mg/dL      Creatinine 3 65 (H) mg/dL      Glucose 109 mg/dL      Calcium 8 5 mg/dL      AST 24 U/L      ALT 31 U/L      Alkaline Phosphatase 75 U/L      Total Protein 7 4 g/dL      Albumin 2 1 (L) g/dL      Total Bilirubin 0 30 mg/dL      eGFR 15 ml/min/1 73sq m     Narrative:         National Kidney Disease Education Program recommendations are as follows:  GFR calculation is accurate only with a steady state creatinine  Chronic Kidney disease less than 60 ml/min/1 73 sq  meters  Kidney failure less than 15 ml/min/1 73 sq  meters      Valproic acid level, total [[id number]]  (Abnormal) Collected:  02/08/18 1530    Lab Status:  Final result Specimen:  Blood from Arm, Left Updated:  02/08/18 1647     Valproic Acid, Total 12 (L) ug/mL     Urine Microscopic [[id number]]  (Abnormal) Collected:  02/08/18 1618    Lab Status:  Final result Specimen:  Urine from Urine, Suprapubic catheter Updated:  02/08/18 1644     RBC, UA 4-10 (A) /hpf      WBC, UA 10-20 (A) /hpf      Epithelial Cells Occasional /hpf      Bacteria, UA Moderate (A) /hpf      Hyaline Casts, UA 4-10 (A) /lpf      COARSE GRANULAR CASTS 0-3 /lpf      Ca Oxalate Filomena, UA Occasional (A) /hpf      WBC Clumps Present     OTHER OBSERVATIONS Renal Tubule Epithelial Cells Present     WBC Casts, UA 0-3 (A) /lpf      Other Casts Waxy Casts    CBC and differential [[id number]]  (Abnormal) Collected:  02/08/18 1530    Lab Status:  Final result Specimen:  Blood from Arm, Left Updated:  02/08/18 1629     WBC 11 62 (H) Thousand/uL      RBC 4 46 Million/uL      Hemoglobin 12 1 g/dL      Hematocrit 39 9 %      MCV 90 fL      MCH 27 1 pg      MCHC 30 3 (L) g/dL      RDW 16 7 (H) %      MPV 10 6 fL      Platelets 533 Thousands/uL      Neutrophils Relative 79 (H) %      Lymphocytes Relative 14 %      Monocytes Relative 5 %      Eosinophils Relative 2 %      Basophils Relative 0 %      Neutrophils Absolute 9 26 (H) Thousands/µL      Lymphocytes Absolute 1 61 Thousands/µL      Monocytes Absolute 0 54 Thousand/µL      Eosinophils Absolute 0 20 Thousand/µL      Basophils Absolute 0 01 Thousands/µL     UA w Reflex to Microscopic [[id number]]  (Abnormal) Collected:  02/08/18 1618    Lab Status:  Final result Specimen:  Urine from Urine, Suprapubic catheter Updated:  02/08/18 1629     Color, UA Yellow     Clarity, UA Slightly Cloudy     Specific Gravity, UA 1 025     pH, UA 5 5     Leukocytes, UA Small (A)     Nitrite, UA Negative     Protein, UA 30 (1+) (A) mg/dl      Glucose, UA Negative mg/dl      Ketones, UA Trace (A) mg/dl      Urobilinogen, UA 0 2 E U /dl      Bilirubin, UA Interference- unable to analyze (A)     Blood, UA Trace-lysed (A)                 XR chest 1 view portable   ED Interpretation by Leti Shah DO (02/08 1749)   See below      Final Result by Trevon Kay MD (02/08 1631)      Low lung volumes with bibasilar atelectasis        Workstation performed: [id number]                    Procedures  ECG 12 Lead Documentation  Date/Time: 2/8/2018 3:15 PM  Performed by: Chanel Alexander  Authorized by: Chanel Alexander     ECG reviewed by me, the ED Provider: yes    Patient location: ED  Previous ECG:     Previous ECG:  Compared to current    Comparison ECG info:  01/30/2018    Similarity:  Changes noted  Interpretation:     Interpretation: non-specific    Rate:     ECG rate:  70    ECG rate assessment: normal    Rhythm:     Rhythm: sinus rhythm    Ectopy:     Ectopy: none    QRS:     QRS axis:  Left  Conduction:     Conduction: abnormal      Abnormal conduction: complete RBBB    ST segments:     ST segments:  Non-specific  T waves:     T waves: non-specific             Phone Contacts  ED Phone Contact    ED Course  ED Course          d/w daughter  ARF may be due to dehydration  Would like IVF and re-assessments  MDM  Number of Diagnoses or Management Options  Acute renal failure (ARF) ([facility] ): new and requires workup     Amount and/or Complexity of Data Reviewed  Clinical lab tests: ordered and reviewed  Tests in the radiology section of CPT®: ordered and reviewed  Tests in the medicine section of CPT®: ordered and reviewed  Decide to obtain previous medical records or to obtain history from someone other than the patient: yes  Obtain history from someone other than the patient: yes  Independent visualization of images, tracings, or specimens: yes      CritCare Time    Disposition  Final diagnoses:   Acute renal failure (ARF) ([facility] )     Time reflects when diagnosis was documented in both MDM as applicable and the Disposition within this note     Time User Action Codes Description Comment    2/8/2018  6:32 PM Lucy Mcgrath Add [N17 9] Acute renal failure (ARF) [facility])       ED Disposition     ED Disposition Condition Comment    Admit  Case was discussed with Dr Lloyd Milan and the patient's admission status was agreed to be Admission Status: inpatient status to the service of Dr Lloyd Milan           Follow-up Information    None       Current Discharge Medication List      CONTINUE these medications which have NOT CHANGED    Details   acetaminophen (TYLENOL) 325 mg tablet Take 650 mg by mouth every 6 (six) hours as needed for mild pain      atorvastatin (LIPITOR) 10 mg tablet Take 10 mg by mouth daily      cholecalciferol (VITAMIN D3) 1,000 units tablet Take 1,000 Units by mouth daily      diltiazem (DILACOR XR) 120 MG 24 hr capsule Take 120 mg by mouth daily      divalproex sodium (DEPAKOTE) 250 mg EC tablet Take 250 mg by mouth 2 (two) times a day      ferrous sulfate 325 (65 Fe) mg tablet Take 325 mg by mouth daily with breakfast      furosemide (LASIX) 80 mg tablet Take 80 mg by mouth 2 (two) times a day      hydrALAZINE (APRESOLINE) 50 mg tablet Take 50 mg by mouth 3 (three) times a day      lisinopril (ZESTRIL) 10 mg tablet Take 1 tablet (10 mg total) by mouth daily  Qty: 30 tablet, Refills: 0    Associated Diagnoses: Essential hypertension      metoprolol succinate (TOPROL-XL) 100 mg 24 hr tablet Take 100 mg by mouth daily      potassium chloride (K-DUR,KLOR-CON) 20 mEq tablet Take 20 mEq by mouth 2 (two) times a day      risperiDONE (RisperDAL) 0 25 mg tablet Take 0 25 mg by mouth 2 (two) times a day      rivaroxaban (XARELTO) 20 mg tablet Take 20 mg by mouth      sertraline (ZOLOFT) 100 mg tablet Take 50 mg by mouth daily           No discharge procedures on file      ED Provider  Electronically Signed by           Ashwini Pack DO  02/08/18 2034

## 2018-02-09 ENCOUNTER — APPOINTMENT (INPATIENT)
Dept: ULTRASOUND IMAGING | Facility: HOSPITAL | Age: 83
DRG: 682 | End: 2018-02-09
Payer: MEDICARE

## 2018-02-09 LAB
ANION GAP SERPL CALCULATED.3IONS-SCNC: 7 MMOL/L (ref 4–13)
ATRIAL RATE: 70 BPM
BACTERIA UR CULT: NORMAL
BUN SERPL-MCNC: 35 MG/DL (ref 5–25)
CALCIUM SERPL-MCNC: 7.8 MG/DL (ref 8.3–10.1)
CHLORIDE SERPL-SCNC: 117 MMOL/L (ref 100–108)
CO2 SERPL-SCNC: 28 MMOL/L (ref 21–32)
CREAT SERPL-MCNC: 2.68 MG/DL (ref 0.6–1.3)
ERYTHROCYTE [DISTWIDTH] IN BLOOD BY AUTOMATED COUNT: 16.4 % (ref 11.6–15.1)
GFR SERPL CREATININE-BSD FRML MDRD: 21 ML/MIN/1.73SQ M
GLUCOSE SERPL-MCNC: 154 MG/DL (ref 65–140)
GLUCOSE SERPL-MCNC: 72 MG/DL (ref 65–140)
GLUCOSE SERPL-MCNC: 82 MG/DL (ref 65–140)
GLUCOSE SERPL-MCNC: 85 MG/DL (ref 65–140)
HCT VFR BLD AUTO: 37.8 % (ref 36.5–49.3)
HGB BLD-MCNC: 10.9 G/DL (ref 12–17)
MAGNESIUM SERPL-MCNC: 2 MG/DL (ref 1.6–2.6)
MCH RBC QN AUTO: 25.6 PG (ref 26.8–34.3)
MCHC RBC AUTO-ENTMCNC: 28.8 G/DL (ref 31.4–37.4)
MCV RBC AUTO: 89 FL (ref 82–98)
P AXIS: 76 DEGREES
PHOSPHATE SERPL-MCNC: 4.1 MG/DL (ref 2.3–4.1)
PLATELET # BLD AUTO: 317 THOUSANDS/UL (ref 149–390)
PMV BLD AUTO: 10.3 FL (ref 8.9–12.7)
POTASSIUM SERPL-SCNC: 4.3 MMOL/L (ref 3.5–5.3)
PR INTERVAL: 182 MS
QRS AXIS: -37 DEGREES
QRSD INTERVAL: 122 MS
QT INTERVAL: 482 MS
QTC INTERVAL: 520 MS
RBC # BLD AUTO: 4.25 MILLION/UL (ref 3.88–5.62)
SODIUM SERPL-SCNC: 152 MMOL/L (ref 136–145)
T WAVE AXIS: 24 DEGREES
VENTRICULAR RATE: 70 BPM
WBC # BLD AUTO: 8.5 THOUSAND/UL (ref 4.31–10.16)

## 2018-02-09 PROCEDURE — 92610 EVALUATE SWALLOWING FUNCTION: CPT

## 2018-02-09 PROCEDURE — 99232 SBSQ HOSP IP/OBS MODERATE 35: CPT | Performed by: INTERNAL MEDICINE

## 2018-02-09 PROCEDURE — 99223 1ST HOSP IP/OBS HIGH 75: CPT | Performed by: INTERNAL MEDICINE

## 2018-02-09 PROCEDURE — 84100 ASSAY OF PHOSPHORUS: CPT | Performed by: INTERNAL MEDICINE

## 2018-02-09 PROCEDURE — 82948 REAGENT STRIP/BLOOD GLUCOSE: CPT

## 2018-02-09 PROCEDURE — 76770 US EXAM ABDO BACK WALL COMP: CPT

## 2018-02-09 PROCEDURE — 85027 COMPLETE CBC AUTOMATED: CPT | Performed by: INTERNAL MEDICINE

## 2018-02-09 PROCEDURE — 83735 ASSAY OF MAGNESIUM: CPT | Performed by: INTERNAL MEDICINE

## 2018-02-09 PROCEDURE — 80048 BASIC METABOLIC PNL TOTAL CA: CPT | Performed by: INTERNAL MEDICINE

## 2018-02-09 RX ORDER — LORAZEPAM 0.5 MG/1
0.5 TABLET ORAL EVERY 6 HOURS PRN
COMMUNITY
End: 2018-02-13 | Stop reason: HOSPADM

## 2018-02-09 RX ORDER — DILTIAZEM HYDROCHLORIDE 120 MG/1
120 CAPSULE, COATED, EXTENDED RELEASE ORAL DAILY
Status: DISCONTINUED | OUTPATIENT
Start: 2018-02-09 | End: 2018-02-10

## 2018-02-09 RX ORDER — DEXTROSE AND SODIUM CHLORIDE 5; .2 G/100ML; G/100ML
75 INJECTION, SOLUTION INTRAVENOUS CONTINUOUS
Status: DISCONTINUED | OUTPATIENT
Start: 2018-02-09 | End: 2018-02-12

## 2018-02-09 RX ORDER — METOPROLOL SUCCINATE 50 MG/1
100 TABLET, EXTENDED RELEASE ORAL DAILY
Status: DISCONTINUED | OUTPATIENT
Start: 2018-02-09 | End: 2018-02-13 | Stop reason: HOSPADM

## 2018-02-09 RX ADMIN — RIVAROXABAN 20 MG: 20 TABLET, FILM COATED ORAL at 09:32

## 2018-02-09 RX ADMIN — SERTRALINE HYDROCHLORIDE 50 MG: 50 TABLET ORAL at 09:32

## 2018-02-09 RX ADMIN — DEXTROSE AND SODIUM CHLORIDE 100 ML/HR: 5; .2 INJECTION, SOLUTION INTRAVENOUS at 23:11

## 2018-02-09 RX ADMIN — ATORVASTATIN CALCIUM 10 MG: 10 TABLET, FILM COATED ORAL at 09:32

## 2018-02-09 RX ADMIN — METOPROLOL SUCCINATE 100 MG: 50 TABLET, EXTENDED RELEASE ORAL at 12:39

## 2018-02-09 RX ADMIN — INSULIN LISPRO 1 UNITS: 100 INJECTION, SOLUTION INTRAVENOUS; SUBCUTANEOUS at 17:44

## 2018-02-09 RX ADMIN — DILTIAZEM HYDROCHLORIDE 120 MG: 120 CAPSULE, COATED, EXTENDED RELEASE ORAL at 12:39

## 2018-02-09 RX ADMIN — VITAMIN D, TAB 1000IU (100/BT) 1000 UNITS: 25 TAB at 09:32

## 2018-02-09 RX ADMIN — DIVALPROEX SODIUM 250 MG: 250 TABLET, DELAYED RELEASE ORAL at 17:47

## 2018-02-09 RX ADMIN — DIVALPROEX SODIUM 250 MG: 250 TABLET, DELAYED RELEASE ORAL at 09:32

## 2018-02-09 RX ADMIN — Medication 325 MG: at 09:32

## 2018-02-09 RX ADMIN — DEXTROSE AND SODIUM CHLORIDE 100 ML/HR: 5; .2 INJECTION, SOLUTION INTRAVENOUS at 12:40

## 2018-02-09 NOTE — SOCIAL WORK
Patient recently discharged from [facility]  He was at [facility] for skilled rehab and will return there at [facility]  Referral via [place]  He is dependent for care, babak lift to [employer] chair  Will follow and arrange transport

## 2018-02-09 NOTE — SOCIAL WORK
Called and spoke with patients daughter Miles Gregorio  She is unhappy with the care at [facility] and does not want patient to return there  She would like referrals to [facility] and [facility]  Referrals to both via [facility]  Await bed availability

## 2018-02-09 NOTE — CONSULTS
Consultation - Nephrology   Damian Padron [de-identified] y o  male MRN: [id number]  Unit/Bed#: [address] [facility] 205-01 Encounter: [id number]    ASSESSMENT and PLAN:  1  Acute kidney injury suspected mainly prerenal component in the setting of poor p o  intake, mental status changes, previous diuretics and loss of autoregulation for ACE inhibitor use  Noted kidney function is improving with intravenously fluids  Recommend to continue with intravenously fluids but I have changed to D5W 1/4 normal saline same rate  Avoid hypotension, avoid nephrotoxic, follow serial labs  Agree to continue holding diuretics and Ace inhibitors for now  2   Hypernatremia in the setting of poor p o  intake, as above will change intravenously fluid from D5 half normal saline to D5W 1/4 normal saline at the same rate and follow labs in the morning  Encourage oral fluid intake  3   Acute encephalopathy multifactorial in the setting of acute kidney injury, hypernatremia, possible UTI  4   Hypertension, blood pressure acceptable for his age, avoid hypotension keep systolic blood pressure over 120 given acute kidney injury  5   Dementia, as per primary team    SUMMARY OF RECOMMENDATIONS:  Continue with intravenously fluid resuscitation, change intravenously fluids to D5W 1/4 normal saline same rate  Follow serial labs  Keep holding Ace and diuretics  Encourage p o  fluid intake  Discussed with Internal Medicine attending  HISTORY OF PRESENT ILLNESS:  Requesting Physician: Judith Whitaker MD  Reason for Consult:  Acute kidney injury, hypernatremia    Damian Padron is a [de-identified] y o  male who was admitted to [address] [place] [address] after presenting with mental status changes  A renal consultation is requested today for assistance in the management of acute renal failure and hypernatremia    Patient with history of dementia, urinary retention with chronic suprapubic catheter, history of PE on anticoagulation, he was recently admitted into the hospital between January 26 to January 31st at that time with mental status changes as well as acute kidney injury with a creatinine on admission of 3 11 that improved with intravenously fluids to 1 18 on discharge today  He was brought into the hospital yesterday from nursing home with mental status changes, found to be in acute renal failure with a creatinine of 3 65 and a serum sodium of 149, was started on D5 half normal saline and nephrology was consulted for further recommendations  During my evaluation patient was sleepy but arousable, denies any complaints, unfortunately he has dementia so he is not a good historian, denies chest pain or shortness of Breath, denies any abdominal pain  Most of the information was obtained after discussion with primary team attending as well as chart review  PAST MEDICAL HISTORY:  Past Medical History:   Diagnosis Date    Abdominal aortic aneurysm (AAA) (HCC)     Anemia     Atrial fibrillation (HCC)     Cardiac disease     Dementia     Diabetes mellitus (HCC)     Encephalopathy     Hyperlipidemia     Hypertension     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     Psychiatric disorder     Renal disorder     UTI (urinary tract infection)        PAST SURGICAL HISTORY:  Past Surgical History:   Procedure Laterality Date    CARDIAC DEFIBRILLATOR PLACEMENT         SOCIAL HISTORY:  History   Alcohol Use No     History   Drug Use No     History   Smoking Status    Former Smoker   Smokeless Tobacco    Never Used       FAMILY HISTORY:  History reviewed  No pertinent family history      ALLERGIES:  No Known Allergies    MEDICATIONS:    Current Facility-Administered Medications:     acetaminophen (TYLENOL) tablet 650 mg, 650 mg, Oral, Q6H PRN, Kianna Wu MD    atorvastatin (LIPITOR) tablet 10 mg, 10 mg, Oral, Daily, Kianna TOMLIN MD, 10 mg at 02/09/18 0932    cholecalciferol (VITAMIN D3) tablet 1,000 Units, 1,000 Units, Oral, Daily, Kianna Greenberg MD, 1,000 Units at 02/09/18 0932    dextrose 5 % and sodium chloride 0 2 % infusion, 100 mL/hr, Intravenous, Continuous, Elda Ponce MD    diltiazem (CARDIZEM CD) 24 hr capsule 120 mg, 120 mg, Oral, Daily, Janelle Carney MD    divalproex sodium (DEPAKOTE) EC tablet 250 mg, 250 mg, Oral, BID, Kianna Greenberg MD, 250 mg at 02/09/18 0932    ferrous sulfate tablet 325 mg, 325 mg, Oral, Daily With Breakfast, Judith Blankenship MD, 325 mg at 02/09/18 0932    metoprolol succinate (TOPROL-XL) 24 hr tablet 100 mg, 100 mg, Oral, Daily, Janelle Carney MD    rivaroxaban (XARELTO) tablet 20 mg, 20 mg, Oral, Daily With Breakfast, Kianna TOMLIN MD, 20 mg at 02/09/18 0932    sertraline (ZOLOFT) tablet 50 mg, 50 mg, Oral, Daily, Kianna TOMLIN MD, 50 mg at 02/09/18 0932    REVIEW OF SYSTEMS:  All the systems were reviewed and were negative except as documented on the HPI      PHYSICAL EXAM:  Current Weight: Weight - Scale: 106 kg (233 lb 14 5 oz)  First Weight: Weight - Scale: 106 kg (233 lb)  Vitals:    02/08/18 1830 02/08/18 2010 02/08/18 2343 02/09/18 0743   BP: 120/68 130/59 136/56 150/67   BP Location: Right arm Right arm Right arm Right arm   Pulse: 64 73 71 84   Resp: 18 18 20 20   Temp:  97 7 °F (36 5 °C) (!) 97 4 °F (36 3 °C) 98 4 °F (36 9 °C)   TempSrc:  Axillary Axillary Oral   SpO2: 92% 95% 92% 92%   Weight:  104 kg (228 lb 6 3 oz)  106 kg (233 lb 14 5 oz)       Intake/Output Summary (Last 24 hours) at 02/09/18 1143  Last data filed at 02/09/18 0909   Gross per 24 hour   Intake                0 ml   Output             1950 ml   Net            -1950 ml       General:  In no acute distress, on room air  Skin:  Warm, dry, no rashes  Eyes:  No jaundice  ENT:  Mucous membranes dry  Neck:  Supple  Chest:  Essentially clear to auscultation bilaterally   CVS:  Regular rate and rhythm  Abdomen:  Soft, nontender, nondistended  Extremities:  Trace bilateral lower extremity edema   Neuro:  Alert interactive, confused  Psych:  Appropriate mood    Invasive Devices:        Lab Results:     Results from last 7 days  Lab Units 02/09/18  0450 02/08/18  1530   WBC Thousand/uL 8 50 11 62*   HEMOGLOBIN g/dL 10 9* 12 1   HEMATOCRIT % 37 8 39 9   PLATELETS Thousands/uL 317 375   SODIUM mmol/L 152* 149*   POTASSIUM mmol/L 4 3 5 2   CHLORIDE mmol/L 117* 111*   CO2 mmol/L 28 29   BUN mg/dL 35* 39*   CREATININE mg/dL 2 68* 3 65*   CALCIUM mg/dL 7 8* 8 5   MAGNESIUM mg/dL 2 0  --    PHOSPHORUS mg/dL 4 1  --    TOTAL PROTEIN g/dL  --  7 4   BILIRUBIN TOTAL mg/dL  --  0 30   ALK PHOS U/L  --  75   ALT U/L  --  31   AST U/L  --  24   GLUCOSE RANDOM mg/dL 72 109       Other Studies:   Urinalysis RBCs 4-10, WBCs 10-20, coarse granular casts 0-3, hyaline casts 4-10, reported WBC cast -noted patient has a chronic suprapubic catheter      Portions of the record may have been created with voice recognition software  Occasional wrong word or "sound a like" substitutions may have occurred due to the inherent limitations of voice recognition software  Read the chart carefully and recognize, using context, where substitutions have occurred  If you have any questions, please contact the dictating provider

## 2018-02-09 NOTE — PHYSICAL THERAPY NOTE
PT screen  Order rec'd chart reviewed  Pt os form snf, dependent for care, babak lift oob if reymundo  No skilled PT needs indicated at this time   D/c PT

## 2018-02-09 NOTE — H&P
H&P Exam - Apple Dwyer [de-identified] y o  male MRN: [id number]    Unit/Bed#: [facility] Encounter: [id number]        History of Present Illness     HPI:  Apple Dwyer is a [de-identified] y o  male male with pmhx of diabetes type 2, hypertension, AAA s/p repair, hyperlipidemia, afib, s/p PPM, CKD with unknown baseline, history of PE and DVT, dementia that presents to E D from nursing home for evaluation for worsening lethargy and decline per NH staff  Pt has history of dementia at baseline and unable to provide me any meaningful history  He is noted confused  He was recently admitted and discharged on 2/2/18 for ESBL producing klebsiella UTI in the setting of chronic suprapubic catheter  The catheter was changed last admission by urologist  He was treated with IV antibiotic with ertapenem and discharged to [facility]  Per previous discharge record his mentation returned to his baseline  He was also treated then for VIVIAN and got better with IV hydration and his creatinine on discharge was 1  18  Today he was brought in for increasing lethargy and more confused than his baseline  No fever or chills  He was noted prior to last discharge to have developed mild fluid overload and was given IV lasix and discharged back on oral lasix 80mg bid  He also was reported have had one of his sedatives dose just increased  In the ED he was hypotensive but this improved with 2L IV fluid given and has been stable  His creatinine noted to have jumped to 3 6 from his recent of 1 18         Historical Information   Past Medical History:   Diagnosis Date    [facility] ([facility]) [facility] )     Anemia     [facility]     Cardiac disease     Dementia     Diabetes mellitus (HCC)     Encephalopathy     Hyperlipidemia     Hypertension     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     Psychiatric disorder     Renal disorder     UTI (urinary tract infection)      Patient Active Problem List   Diagnosis    Sepsis ([facility] )    UTI (urinary tract infection) due to urinary indwelling catheter ([place] [address] )    History of pulmonary embolus (PE)    Essential hypertension    Encephalopathy, metabolic    VIVIAN (acute kidney injury) ([place] [address] )    Hypernatremia    Paroxysmal atrial fibrillation (HCC)    Senile dementia with behavioral disturbance    Altered mental status     Past Surgical History:   Procedure Laterality Date    CARDIAC DEFIBRILLATOR PLACEMENT         Social History   History   Alcohol Use No     History   Drug Use No     History   Smoking Status    Former Smoker   Smokeless Tobacco    Never Used       Family History: History reviewed  No pertinent family history  Meds/Allergies     No current facility-administered medications for this encounter       Current Outpatient Prescriptions:     acetaminophen (TYLENOL) 325 mg tablet, Take 650 mg by mouth every 6 (six) hours as needed for mild pain, Disp: , Rfl:     atorvastatin (LIPITOR) 10 mg tablet, Take 10 mg by mouth daily, Disp: , Rfl:     cholecalciferol (VITAMIN D3) 1,000 units tablet, Take 1,000 Units by mouth daily, Disp: , Rfl:     diltiazem (DILACOR XR) 120 MG 24 hr capsule, Take 120 mg by mouth daily, Disp: , Rfl:     divalproex sodium (DEPAKOTE) 250 mg EC tablet, Take 250 mg by mouth 2 (two) times a day, Disp: , Rfl:     ferrous sulfate 325 (65 Fe) mg tablet, Take 325 mg by mouth daily with breakfast, Disp: , Rfl:     furosemide (LASIX) 80 mg tablet, Take 80 mg by mouth 2 (two) times a day, Disp: , Rfl:     hydrALAZINE (APRESOLINE) 50 mg tablet, Take 50 mg by mouth 3 (three) times a day, Disp: , Rfl:     lisinopril (ZESTRIL) 10 mg tablet, Take 1 tablet (10 mg total) by mouth daily, Disp: 30 tablet, Rfl: 0    metoprolol succinate (TOPROL-XL) 100 mg 24 hr tablet, Take 100 mg by mouth daily, Disp: , Rfl:     potassium chloride (K-DUR,KLOR-CON) 20 mEq tablet, Take 20 mEq by mouth 2 (two) times a day, Disp: , Rfl:     risperiDONE (RisperDAL) 0 25 mg tablet, Take 0 25 mg by mouth 2 (two) times a day, Disp: , Rfl:     rivaroxaban (XARELTO) 20 mg tablet, Take 20 mg by mouth, Disp: , Rfl:     sertraline (ZOLOFT) 100 mg tablet, Take 50 mg by mouth daily, Disp: , Rfl:     No Known Allergies    Review of Systems  Unable to provide secondary to change in mental status        Objective   Vitals: Blood pressure 120/68, pulse 64, temperature 98 °F (36 7 °C), temperature source Temporal, resp  rate 18, weight 106 kg (233 lb), SpO2 92 %  Physical Exam   General- Awake but confused  He is confused  He would not answer my questions but responds to verbal stimuli to name and some commands  HEENT: PERRLA, EOMI, sclera anicteric, very dry mucous membranes, tongue mucosa very dry without lesions  Neck: supple, no JVD, lymphadenopathy, thyromegaly  PPM insitu  Heart: Regular, S1S2 present  No murmur, rub or gallop  Lungs; Decreased breath sounds  No wheezing, crackles or rhonchi  No accessory muscle use or respiratory distress  Abdomen: soft, non-tender, non-distended, NABS  No guarding or rebound  No peritoneal sound or mass  Extremities: Bilateral LE +1 edema  Neurologic:  Confused, but moving all extremities independently  No focal deficits noted  Skin: warm and dry  No petechiae, purpura or rash  Lab Results:     Results from last 7 days  Lab Units 02/08/18  1530   WBC Thousand/uL 11 62*   HEMOGLOBIN g/dL 12 1   HEMATOCRIT % 39 9   PLATELETS Thousands/uL 375       Results from last 7 days  Lab Units 02/08/18  1530   SODIUM mmol/L 149*   POTASSIUM mmol/L 5 2   CHLORIDE mmol/L 111*   CO2 mmol/L 29   BUN mg/dL 39*   CREATININE mg/dL 3 65*   GLUCOSE RANDOM mg/dL 109   CALCIUM mg/dL 8 5         Imaging:  Xr Chest 1 View Portable    Result Date: 2/8/2018  Impression: Low lung volumes with bibasilar atelectasis       EKG, Pathology, and Other Studies:Reviewed    Assessment/Plan     Assessment:  - Altered mental status secondary to metabolic encephalopathy possibly secondary to volume depletion and recently increased dose of sedatives  -Hypotension improved on IVF  - VIVIAN due to volume depletion in setting of diuretic use and ACEI  - recently treated ESBL producing klebsiella UTI in setting of suprapubic catheter placement  -Paroxysmal A fib on Xarelto  -history of PE and DVT on Xarelto  - Hypernatremia in setting of volume depletion  -Senile dementia  -Hyperlipidemia  - Diet controlled diabetes type 2  -Chronic diastolic heart failure  -DNR/DNI    Plan:  - Will continue with IV fluid for resuscitation  Daughter has indicated in the ED if no improvement with hydration in couple of days may consider hospice  -BP improving, continue to monitor closely and bolus intermittent as needed  - Recently completely course of antibiotics for UTI, will keep off for now  - Rpt BMP in the AM  - Hold all antihypertensives including his diuretics and ACEI  -Continue his Xarelto at current dose, if no improvement in his renal function, may need to readdress dosage  -Hold his sedatives for now  -cautious with hydration given recent overload and his of chronic diastolic heart failure  Intermittent volume status check  Code Status: Prior      Counseling / Coordination of Care  Total floor / unit time spent today 55 minutes  Greater than 50% of total time was spent with the patient and / or family counseling and / or coordination of care  Anticipated duration of stay at least 2 midnight  Portions of the record may have been created with voice recognition software  Occasional wrong word or "sound a like" substitutions may have occurred due to the inherent limitations of voice recognition software  Read the chart carefully and recognize, using context, where substitutions have occurred

## 2018-02-09 NOTE — PHYSICIAN ADVISOR
Current patient class: Inpatient  The patient is currently on Hospital Day: 2      The patient was admitted to the hospital  on 2/8/18 at [id number] for the following diagnosis:  Altered mental status [R41 82]  Acute renal failure (ARF) ([facility] ) [N17 9]       There is documentation in the medical record of an expected length of stay of at least 2 midnights  The patient is therefore expected to satisfy the 2 midnight benchmark and given the 2 midnight presumption is appropriate for INPATIENT ADMISSION  Given this expectation of a satisfying stay, CMS instructs us that the patient is most often appropriate for inpatient admission under part A provided medical necessity is documented in the chart  After review of the relevant documentation, labs, vital signs and test results, the patient is appropriate for INPATIENT ADMISSION  Admission to the hospital as an inpatient is a complex decision making process which requires the practitioner to consider the patients presenting complaint, history and physical examination and all relevant testing  With this in mind, in this case, the patient was deemed appropriate for INPATIENT ADMISSION  After review of the documentation and testing available at the time of the admission I concur with this clinical determination of medical necessity  The patient does have an inpatient admission within the previous 30 days  The patient was admitted on 1/26/18 and discharged on 2/2/18 as an inpatient  The patient therefore required readmission review  In this case the patient should be considered a SEPARATE and UNRELATED INPATIENT ADMISSION  The patient had been discharged in stable condition with a completed care plan  There were no unresolved acute medical issues at the time of discharged which would have reasonably been expected to prompt this readmission  Rationale is as follows:     The patient is a [de-identified] yrs   Male who presented to the ED at 2/8/2018  3:09 PM with a chief complaint of Altered Mental Status (To ED via EMS for evaluation or worsening lethargy and "decline" per staff  Patient has multiple chronic medical conditions  )     Patient admitted from his living facility with a report of increasing lethargy and a decreasing mental status from his normal baseline  He was recently admitted to the hospital with an acute encephalopathy secondary to pneumonia, UTI and sepsis  Those problems were resolved and it is noted that he returned to his normal baseline status when discharged  On this admission, the patient had an elevated BUN of 39 (previously 10), and a creatinine of 3 65 (previously 1 18)  He was sseen in consult by Nephrology who felt that his kidney issue was pre-renal and could be secondary to poor oral intake, change sin mental status and previous use of diuretics  A renal US is ordered  This patient has several chronic issues; however, he was discharged with mental status changes secondary to sepsis and that does not appear to be the case presently  It would be appropriate to consider this admission as SEPARATE and UNRELATED      The patients vitals on arrival were ED Triage Vitals [02/08/18 1522]   Temperature Pulse Respirations Blood Pressure SpO2   98 °F (36 7 °C) 69 20 102/53 96 %      Temp Source Heart Rate Source Patient Position - Orthostatic VS BP Location FiO2 (%)   Temporal Monitor Lying Left arm --      Pain Score       No Pain           Past Medical History:   Diagnosis Date    Abdominal aortic aneurysm (AAA) (HCC)     Anemia     Atrial fibrillation (HCC)     Cardiac disease     Dementia     Diabetes mellitus (HCC)     Encephalopathy     Hyperlipidemia     Hypertension     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     Psychiatric disorder     Renal disorder     UTI (urinary tract infection)      Past Surgical History:   Procedure Laterality Date    CARDIAC DEFIBRILLATOR PLACEMENT             Consults have been placed to: IP CONSULT TO CASE MANAGEMENT  IP CONSULT TO NEPHROLOGY  IP CONSULT TO INFECTIOUS DISEASES    Vitals:    02/08/18 2010 02/08/18 2343 02/09/18 0743 02/09/18 1455   BP: 130/59 136/56 150/67 118/54   BP Location: Right arm Right arm Right arm Right arm   Pulse: 73 71 84 71   Resp: 18 20 20 22   Temp: 97 7 °F (36 5 °C) (!) 97 4 °F (36 3 °C) 98 4 °F (36 9 °C) 98 8 °F (37 1 °C)   TempSrc: Axillary Axillary Oral Oral   SpO2: 95% 92% 92% 95%   Weight: 104 kg (228 lb 6 3 oz)  106 kg (233 lb 14 5 oz)        Most recent labs:    Recent Labs      02/08/18   1530  02/09/18   0450   WBC  11 62*  8 50   HGB  12 1  10 9*   HCT  39 9  37 8   PLT  375  317   K  5 2  4 3   NA  149*  152*   CALCIUM  8 5  7 8*   BUN  39*  35*   CREATININE  3 65*  2 68*   TROPONINI  0 03   --    AST  24   --    ALT  31   --    ALKPHOS  75   --    BILITOT  0 30   --        Scheduled Meds:  Current Facility-Administered Medications:  acetaminophen 650 mg Oral Q6H PRN Kianna Chowdary MD    atorvastatin 10 mg Oral Daily Kianna Chowdary MD    cholecalciferol 1,000 Units Oral Daily Kianna TOMLIN MD    dextrose 5 % and sodium chloride 0 2 % 100 mL/hr Intravenous Continuous Lawyer Yesenia MD Last Rate: 100 mL/hr (02/09/18 1240)   diltiazem 120 mg Oral Daily Briana Mckee MD    divalproex sodium 250 mg Oral BID Kianna Chowdary MD    ferrous sulfate 325 mg Oral Daily With Breakfast Kianna Chowdary MD    insulin lispro 1-6 Units Subcutaneous TID AC Briana Mckee MD    metoprolol succinate 100 mg Oral Daily Briana Mckee MD    rivaroxaban 20 mg Oral Daily With Breakfast Kianna Chowdary MD    sertraline 50 mg Oral Daily Kianna Chowdary MD      Continuous Infusions:  dextrose 5 % and sodium chloride 0 2 % 100 mL/hr Last Rate: 100 mL/hr (02/09/18 1240)     PRN Meds:   acetaminophen    Surgical procedures (if appropriate):

## 2018-02-09 NOTE — CASE MANAGEMENT
Initial Clinical Review    Admission: Date/Time/Statement: 2/8/18 @ 1833     Orders Placed This Encounter   Procedures    Inpatient Admission (expected length of stay for this patient is greater than two midnights)     Standing Status:   Standing     Number of Occurrences:   1     Order Specific Question:   Admitting Physician     Answer:   Marycarmen Hare [[id number]]     Order Specific Question:   Level of Care     Answer:   Med Surg [16]     Order Specific Question:   Bed request comments     Answer:   Telemetry     Order Specific Question:   Estimated length of stay     Answer:   More than 2 Midnights     Order Specific Question:   Certification     Answer:   I certify that inpatient services are medically necessary for this patient for a duration of greater than two midnights  See H&P and MD Progress Notes for additional information about the patient's course of treatment  ED: Date/Time/Mode of Arrival:   ED Arrival Information     Expected Arrival Acuity Means of Arrival Escorted By Service Admission Type    - 2/8/2018 15:09 Urgent Ambulance [facility]) General Medicine Urgent    Arrival Complaint    altered mental status      Chief Complaint:   Chief Complaint   Patient presents with    Altered Mental Status     To ED via EMS for evaluation or worsening lethargy and "decline" per staff  Patient has multiple chronic medical conditions  History of Illness:   Brought in from NH for AMS  Notes worsening lethargy and more confused than usual  Hx of demential  Recently d/c from hospital after being txd for complicated uti (mrsa/esbl w/ suprapubic catheter) and prolonged encephalopathy  Finished abx at [facility] and well for a few days  Per daughter, had risperdal and pain meds increased due to agitation earlier in the week  In ED, pt awake and alert, oriented to self  Has no complaint  Noted to be hypotensive, but not tachycardic or febrile    ED Vital Signs:   ED Triage Vitals [02/08/18 1522]   Temperature Pulse Respirations Blood Pressure SpO2   98 °F (36 7 °C) 69 20 102/53 96 %      Temp Source Heart Rate Source Patient Position - Orthostatic VS BP Location FiO2 (%)   Temporal Monitor Lying Left arm --      Pain Score       No Pain        Wt Readings from Last 1 Encounters:   02/09/18 106 kg (233 lb 14 5 oz)   Vital Signs (abnormal):   T 97 4  Abnormal Labs/Diagnostic Test Results:   WBC 11 62   BUN 39 CR 3 65  ALB 2 1 GFR 15   VALPROIC ACID 12   U/A+LEUK, KETONES, BILI, BLOOD, BACTERIA  CXR=Low lung volumes with bibasilar atelectasis  EKG=  Ventricular Rate BPM 70    Atrial Rate BPM 70    OH Interval ms 182    QRSD Interval ms 122    QT Interval ms 482    QTC Interval ms 520    P [place] degrees 76    QRS Axis degrees -37    T Wave Axis degrees 24    Narrative     Sinus rhythm with Premature atrial complexes  Left axis deviation  Right bundle branch block        ED Treatment:   Medication Administration from 02/08/2018 1509 to 02/08/2018 2010       Date/Time Order Dose Route Action Action by Comments     02/08/2018 1700 sodium chloride 0 9 % bolus 1,000 mL 0 mL Intravenous Stopped Ruby Keane RN      02/08/2018 1534 sodium chloride 0 9 % bolus 1,000 mL 1,000 mL Intravenous New Bag Ruby Keane RN      02/08/2018 1816 sodium chloride 0 9 % bolus 1,000 mL 0 mL Intravenous Stopped Ruby Keane RN      02/08/2018 1628 sodium chloride 0 9 % bolus 1,000 mL 1,000 mL Intravenous New Bag Ruby Keane RN           Past Medical/Surgical History:    Active Ambulatory Problems     Diagnosis Date Noted    [facility] 01/27/2018    UTI (urinary tract infection) due to urinary indwelling catheter ([facility] ) 01/27/2018    History of pulmonary embolus (PE) 01/28/2018    Essential hypertension 01/29/2018    Encephalopathy, metabolic 12/45/1666    VIVIAN [facility]acute kidney injury) [facility] ) 01/29/2018    Hypernatremia 01/29/2018    Paroxysmal atrial fibrillation (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     Psychiatric disorder     Renal disorder     UTI (urinary tract infection)        Admitting Diagnosis: Altered mental status [R41 82]  Acute renal failure (ARF) (HCC) [N17 9]    Age/Sex: [de-identified] y o  male    Assessment/Plan:   - Altered mental status secondary to metabolic encephalopathy possibly secondary to volume depletion and recently increased dose of sedatives  -Hypotension improved on IVF  - VIVIAN due to volume depletion in setting of diuretic use and ACEI  - recently treated ESBL producing klebsiella UTI in setting of suprapubic catheter placement  -Paroxysmal A fib on Xarelto  -history of PE and DVT on Xarelto  - Hypernatremia in setting of volume depletion  -Senile dementia  -Hyperlipidemia  - Diet controlled diabetes type 2  -Chronic diastolic heart failure  -DNR/DNI     Plan:  - Will continue with IV fluid for resuscitation  Daughter has indicated in the ED if no improvement with hydration in couple of days may consider hospice  -BP improving, continue to monitor closely and bolus intermittent as needed  - Recently completely course of antibiotics for UTI, will keep off for now  - Rpt BMP in the AM  - Hold all antihypertensives including his diuretics and ACEI  -Continue his Xarelto at current dose, if no improvement in his renal function, may need to readdress dosage  -Hold his sedatives for now  -cautious with hydration given recent overload and his of chronic diastolic heart failure  Intermittent volume status check       Admission Orders:  TELEMETRY  PT/OT/ST  EVAL & Ellyn Earnest  CONSULT RENAL  CONSULT ID  CONTACT ISOLATION  Scheduled Meds:   Current Facility-Administered Medications:  acetaminophen 650 mg Oral Q6H PRN Kianna Vaughan MD   atorvastatin 10 mg Oral Daily Kianna Vaughan MD   cholecalciferol 1,000 Units Oral Daily Kianna TOMLIN MD   dextrose 5 % and sodium chloride 0 2 % 100 mL/hr Intravenous Continuous Mayra Linda MD   diltiazem 120 mg Oral Daily Sergei Vela MD   divalproex sodium 250 mg Oral BID Kianna Vaughan MD   ferrous sulfate 325 mg Oral Daily With Breakfast Kianna Vaughan MD   metoprolol succinate 100 mg Oral Daily Sergei Vela MD   rivaroxaban 20 mg Oral Daily With Breakfast Kianna Vaughan MD   sertraline 50 mg Oral Daily Kianna TOMLIN MD     Continuous Infusions:   dextrose 5 % and sodium chloride 0 2 % 100 mL/hr     PRN Meds:   acetaminophen

## 2018-02-09 NOTE — PROGRESS NOTES
Progress Note - Jean Carlos Vicente [de-identified] y o  male MRN: [id number]  Unit/Bed#: [address] [place] [address] [id number] Encounter: [id number]    Assessment:  Principal Problem:    [facility]  Active Problems:    UTI (urinary tract infection) due to urinary indwelling catheter ([facility] )    History of pulmonary embolus (PE)    Essential hypertension    Encephalopathy, metabolic    Paroxysmal atrial fibrillation ([facility] )    Senile dementia with behavioral disturbance    Altered mental status  Resolved Problems:    * No resolved hospital problems  *      Plan:  · Acute kidney injury on chronic kidney disease stage 3  · Acute encephalopathy secondary to above  · Hypernatremia  On IV fluids  Renal ultrasound  Follow-up renal function  Avoid nephrotoxins medications/hypotension  Nephrology consult  Hold Lasix and Ace inhibitors  · UTI ? Colonization  Will hold off antibiotics  Patient is afebrile and no leukocytosis  Will have ID input  · Paroxysmal atrial fibrillation, chronic diastolic heart  On Xarelto for anticoagulation  Hold Lasix  Will restart on Toprol-XL and Cardizem CD for rate control  Hold lisinopril  · Speech/swallow evaluation  · DVT prophylaxis  Subjective:   Patient is seen and examined at bedside  Patient is somewhat confused and unable to provide a good history  Afebrile  Objective:   Vitals: Blood pressure 150/67, pulse 84, temperature 98 4 °F (36 9 °C), temperature source Oral, resp  rate 20, weight 106 kg (233 lb 14 5 oz), SpO2 92 %  ,Body mass index is 31 72 kg/m²    SPO2 RA Rest    Flowsheet Row ED to Hosp-Admission (Current) from 2/8/2018 in [facility] Surg [facility]   SpO2  92 %   SpO2 Activity  At Rest   O2 Device  None (Room air)   O2 Flow Rate  2 L/min        I&O:   Intake/Output Summary (Last 24 hours) at 02/09/18 0933  Last data filed at 02/09/18 0909   Gross per 24 hour   Intake                0 ml   Output             1950 ml   Net            -1950 ml       Physical Exam:    General- Alert, lying comfortably in bed  Not in any acute distress  HEENT- CITLALLI, EOM intact  Neck- Supple, No JVD  CVS-  S1 and S2 normal   Chest- Bilateral Air entry, No rhochi, crackles or wheezing present  Abdomen- soft, suprapubic catheter in place, no guarding or rigidity, BS+  Extremities-  No pedal edema, No calf tenderness  CNS-    No focal deficits present  Invasive Devices     Peripheral Intravenous Line            Peripheral IV 02/08/18 Left Antecubital less than 1 day    Peripheral IV 02/08/18 Left Wrist less than 1 day          Drain            Suprapubic Catheter 18 Fr  13 days                      Social History  reviewed  History reviewed  No pertinent family history   reviewed    Meds:  Current Facility-Administered Medications   Medication Dose Route Frequency Provider Last Rate Last Dose    acetaminophen (TYLENOL) tablet 650 mg  650 mg Oral Q6H PRN Kianna Diamond MD        atorvastatin (LIPITOR) tablet 10 mg  10 mg Oral Daily Kianna Diamond MD        cholecalciferol (VITAMIN D3) tablet 1,000 Units  1,000 Units Oral Daily Kianna Diamond MD        divalproex sodium (DEPAKOTE) EC tablet 250 mg  250 mg Oral BID Kianna Diamond MD   250 mg at 02/08/18 2048    ferrous sulfate tablet 325 mg  325 mg Oral Daily With Breakfast Kianna Diamond MD        rivaroxaban (XARELTO) tablet 20 mg  20 mg Oral Daily With Breakfast Kianna Diamond MD        sertraline (ZOLOFT) tablet 50 mg  50 mg Oral Daily Kianna Diamond MD        sodium chloride infusion 0 45 %  100 mL/hr Intravenous Continuous Kianna Diamond  mL/hr at 02/08/18 2047 100 mL/hr at 02/08/18 2047      Prescriptions Prior to Admission   Medication    LORazepam (ATIVAN) 0 5 mg tablet    acetaminophen (TYLENOL) 325 mg tablet    atorvastatin (LIPITOR) 10 mg tablet    cholecalciferol (VITAMIN D3) 1,000 units tablet    diltiazem (DILACOR XR) 120 MG 24 hr capsule    divalproex sodium (DEPAKOTE) 250 mg EC tablet    ferrous sulfate 325 (65 Fe) mg tablet    furosemide (LASIX) 80 mg tablet    hydrALAZINE (APRESOLINE) 50 mg tablet    lisinopril (ZESTRIL) 10 mg tablet    metoprolol succinate (TOPROL-XL) 100 mg 24 hr tablet    potassium chloride (K-DUR,KLOR-CON) 20 mEq tablet    risperiDONE (RisperDAL) 0 25 mg tablet    rivaroxaban (XARELTO) 20 mg tablet    sertraline (ZOLOFT) 100 mg tablet       Labs:    Results from last 7 days  Lab Units 02/09/18  0450 02/08/18  1530   WBC Thousand/uL 8 50 11 62*   HEMOGLOBIN g/dL 10 9* 12 1   HEMATOCRIT % 37 8 39 9   PLATELETS Thousands/uL 317 375   NEUTROS PCT %  --  79*   LYMPHS PCT %  --  14   MONOS PCT %  --  5   EOS PCT %  --  2       Results from last 7 days  Lab Units 02/09/18  0450 02/08/18  1530   SODIUM mmol/L 152* 149*   POTASSIUM mmol/L 4 3 5 2   CHLORIDE mmol/L 117* 111*   CO2 mmol/L 28 29   BUN mg/dL 35* 39*   CREATININE mg/dL 2 68* 3 65*   CALCIUM mg/dL 7 8* 8 5   TOTAL PROTEIN g/dL  --  7 4   BILIRUBIN TOTAL mg/dL  --  0 30   ALK PHOS U/L  --  75   ALT U/L  --  31   AST U/L  --  24   GLUCOSE RANDOM mg/dL 72 109     Lab Results   Component Value Date    TROPONINI 0 03 02/08/2018         Lab Results   Component Value Date    BLOODCX No Growth After 5 Days  01/26/2018    BLOODCX No Growth After 5 Days  01/26/2018    URINECX (A) 01/27/2018     10,000-19,000 cfu/ml Methicillin Resistant Staphylococcus aureus    URINECX 10,000-19,000 cfu/ml Klebsiella pneumoniae ESBL (A) 01/27/2018    URINECX 4075-5280 cfu/ml Enterococcus faecalis (A) 01/27/2018         Imaging:  Results for orders placed during the hospital encounter of 02/08/18   XR chest 1 view portable    Narrative CHEST     INDICATION:  Confusion    COMPARISON:  1/20/2018 at 1:59 PM     VIEWS:   AP frontal    IMAGES:  2    FINDINGS:      Left transvenous defibrillator pacemaker leads are unchanged in position  Mild cardiomegaly appears stable  Lung volumes are low  Bibasilar atelectasis is present  No pneumothorax  Visualized osseous structures appear within normal limits for the patient's age  Impression Low lung volumes with bibasilar atelectasis  Workstation performed: [id number]       Results for orders placed during the hospital encounter of 01/26/18   XR chest pa & lateral    Narrative CHEST     INDICATION:  Cough    COMPARISON:  1/26/2018    VIEWS:  Frontal and lateral projections    IMAGES:  2    FINDINGS:  There is a stable pacer/ICD  Cardiomediastinal silhouette appears unremarkable  The lungs are clear  No pneumothorax or pleural effusion  Visualized osseous structures appear within normal limits for the patient's age  Impression No active pulmonary disease  Workstation performed: [id number]         Labs & Imaging: I have personally reviewed pertinent reports        VTE Pharmacologic Prophylaxis:  Xarelto  VTE Mechanical Prophylaxis: sequential compression device    Code Status:   Level 3 - DNAR and DNI      "This note has been constructed using a voice recognition system"      Percy Henson MD  2/9/2018,9:33 AM

## 2018-02-09 NOTE — SPEECH THERAPY NOTE
Speech-Language Pathology Bedside Swallow Evaluation        Patient Name: Leta Alvarado    [id number] Date: 2/9/2018     Problem List  Patient Active Problem List   Diagnosis    [facility]    UTI (urinary tract infection) due to urinary indwelling catheter ([facility]    History of pulmonary embolus (PE)    Essential hypertension    Encephalopathy, metabolic    [facility]    Hypernatremia    Paroxysmal atrial fibrillation ([facility]    Senile dementia with behavioral disturbance    Altered mental status       Past Medical History  Past Medical History:   Diagnosis Date    Abdominal aortic [facility] (AAA) ([facility]     Anemia     Atrial fibrillation ([facility]     Cardiac disease     Dementia     Diabetes mellitus (HCC)     Encephalopathy     Hyperlipidemia     Hypertension     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     Psychiatric disorder     Renal disorder     UTI (urinary tract infection)        Past Surgical History  Past Surgical History:   Procedure Laterality Date    CARDIAC DEFIBRILLATOR PLACEMENT           Current Medical Status  Pt is a [de-identified] y o  male who presented to [facility] 2/8/18 with VIVIAN; change in MS  Pt  presents to E D from nursing home for evaluation for worsening lethargy and decline per NH staff  Pt has history of dementia at baseline and unable to provide me any meaningful history  He is noted confused  Past medical history:   Please see H&P for details      Special Studies:  CXR: 2/8/18 Lung volumes are low  Bibasilar atelectasis is present       Social/Education/Vocational Hx:  Pt lives in SNF[facility]    Swallow Information   Current Risks for Dysphagia & Aspiration: known history of dysphagia and AMS     Current Symptoms/Concerns: known dysphagia    Current Diet: puree/level 1 diet and nectar thick liquids      Baseline Diet: puree/level 1 diet and nectar thick liquids      Baseline Assessment   Behavior/Cognition: alert    Speech/Language Status: able to participate in basic conversation and able to follow commands    Patient Positioning: upright in bed       Swallow Mechanism Exam   Facial: symmetrical  Labial: WFL  Lingual: WFL  Mandible: adequate ROM  Dentition: upper dentures  Vocal quality:clear/adequate   Volitional Cough: strong/productive   Respiratory: NC      Consistencies Assessed and Performance   Consistencies Administered: thin liquids, nectar thick, honey thick and puree    Oral Stage: adequate bolus retrieval via spoon, cup and straw, pt tended to take successive sips by straw  Pharyngeal Stage: swallows mildly delayed, but complete  Coughing episode noted after successive sips of  Thin liquids  Esophageal Concerns: none reported        Summary   Swallow Summary: Mild dysphagia w/ limited consistencies assessed  Appeared at risk for aspiration w/ successive sips of thin liquids by straw      Recommendations: puree/level 1 diet and nectar thick liquids     Recommended Form of Meds: crushed with puree     Aspiration precautions and compensatory swallowing strategies: upright posture, only feed when fully alert, slow rate of feeding and small bites/sips    Results Reviewed with: patient and RN     Treatment Recommendations: will follow up for diet tolerance and potential to advance diet

## 2018-02-09 NOTE — CONSULTS
Consultation - Infectious Disease   Holli Aggarwal [de-identified] y o  male MRN: [id number]  Unit/Bed#: [address] [place] [address] [phone] Encounter: [id number]      Assessment/Plan     Assessment:  [de-identified]y o  year old male with PMH of DM, AAA repair, HLD, AF, s/p PPM, CKD, h/o PE, DVT, and dementia who presents from nursing home with lethargy  1  Lethargy and acute encephalopathy - possibly due to decreased po intake  Less likely due to sepsis with no infectious etiology evident at this time  UA only showed 10-20 WBC in the setting of recently satisfactorily completing 7 day course of ertapenem for Klebsiella cUTI  CXR did not show any infiltrates and pt remains afebrile without leukocytosis  Plan:  1  Continue to monitor off abx with concern for infection being low at this time  If pt continues to remain clinically stable, then stable for discharge off abx  Please call back if any infectious issues arise  History of Present Illness   Physician Requesting Consult: Heron Roberson MD  Reason for Consult / Principal Problem: ?sepsis  Hx and PE limited by: none  HPI: Holli Aggarwal is a [de-identified]y o  year old male with PMH of DM, AAA repair, HLD, AF, s/p PPM, CKD, h/o PE, DVT, and dementia who presents from nursing home with lethargy  He has underlying dementia and unable to provide much history  He was recently admitted here at the end of last month for ESBL Klebsiella cUTI  CT A/P showed thickened bladder wall though collapsed with a suprapubic catheter  Catheter was changed and pt was discharged on 7 day course of ertapenem  On admission now, he was afebrile without leukocytosis  UA showed 10-20 WBC and urine cx are in process  CXR showed low lung volumes with bibasilar atelectasis  Renal US showed no hydronephrosis  He is being monitored off abx now       Inpatient consult to Infectious Diseases  Consult performed by: Shlomo Tilley, [address] [place] [address] ordered by: Mayra Mcgill          Review of Systems   Unable to perform ROS: Dementia Constitutional: Negative for chills  Historical Information   Past Medical History:   Diagnosis Date    [facility]     Anemia     Atrial fibrillation [facility]     Cardiac disease     Dementia     Diabetes mellitus (HCC)     Encephalopathy     Hyperlipidemia     Hypertension     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     Psychiatric disorder     Renal disorder     UTI (urinary tract infection)      Past Surgical History:   Procedure Laterality Date    CARDIAC DEFIBRILLATOR PLACEMENT       Social History   History   Alcohol Use No     History   Drug Use No     History   Smoking Status    Former Smoker   Smokeless Tobacco    Never Used     Family History: non-contributory    Meds/Allergies   all current active meds have been reviewed    No Known Allergies    Objective       Intake/Output Summary (Last 24 hours) at 02/09/18 1722  Last data filed at 02/09/18 0909   Gross per 24 hour   Intake                0 ml   Output             1950 ml   Net            -1950 ml       Invasive Devices:   Peripheral IV 02/09/18 Right Hand (Active)   Site Assessment Clean; Intact;Dry 2/9/2018  3:00 PM   Dressing Type Transparent 2/9/2018  3:00 PM   Line Status Flushed 2/9/2018  3:00 PM   Dressing Status Clean;Dry; Intact 2/9/2018  3:00 PM       Suprapubic Catheter 18 Fr  (Active)   Dressing Status Old drainage 2/8/2018  3:41 PM   Output (mL) 600 mL 2/9/2018  9:09 AM       Physical Exam   Constitutional: He appears well-developed and well-nourished  No distress  Cardiovascular: Normal rate and regular rhythm  Pulmonary/Chest: Effort normal and breath sounds normal  No respiratory distress  He has no rales  Abdominal: Soft  Bowel sounds are normal  He exhibits no distension  There is no tenderness  Musculoskeletal: He exhibits no edema  Skin: Skin is warm and dry  No erythema  Lab Results: I have personally reviewed pertinent labs    Imaging Studies: I have personally reviewed pertinent reports  EKG, Pathology, and Other Studies: I have personally reviewed pertinent reports  Counseling / Coordination of Care  Total floor / unit time spent today 35 minutes  Greater than 50% of total time was spent with the patient and / or family counseling and / or coordination of care   A description of the counseling / coordination of care:

## 2018-02-10 LAB
ANION GAP SERPL CALCULATED.3IONS-SCNC: 6 MMOL/L (ref 4–13)
BACTERIA UR QL AUTO: ABNORMAL /HPF
BASOPHILS # BLD AUTO: 0.02 THOUSANDS/ΜL (ref 0–0.1)
BASOPHILS NFR BLD AUTO: 0 % (ref 0–1)
BILIRUB UR QL STRIP: NEGATIVE
BUN SERPL-MCNC: 26 MG/DL (ref 5–25)
CALCIUM SERPL-MCNC: 7.7 MG/DL (ref 8.3–10.1)
CHLORIDE SERPL-SCNC: 111 MMOL/L (ref 100–108)
CLARITY UR: CLEAR
CO2 SERPL-SCNC: 30 MMOL/L (ref 21–32)
COLOR UR: YELLOW
CREAT SERPL-MCNC: 1.94 MG/DL (ref 0.6–1.3)
EOSINOPHIL # BLD AUTO: 0.23 THOUSAND/ΜL (ref 0–0.61)
EOSINOPHIL NFR BLD AUTO: 3 % (ref 0–6)
ERYTHROCYTE [DISTWIDTH] IN BLOOD BY AUTOMATED COUNT: 15.9 % (ref 11.6–15.1)
GFR SERPL CREATININE-BSD FRML MDRD: 32 ML/MIN/1.73SQ M
GLUCOSE SERPL-MCNC: 106 MG/DL (ref 65–140)
GLUCOSE SERPL-MCNC: 107 MG/DL (ref 65–140)
GLUCOSE SERPL-MCNC: 149 MG/DL (ref 65–140)
GLUCOSE SERPL-MCNC: 80 MG/DL (ref 65–140)
GLUCOSE SERPL-MCNC: 91 MG/DL (ref 65–140)
GLUCOSE UR STRIP-MCNC: NEGATIVE MG/DL
HCT VFR BLD AUTO: 36.3 % (ref 36.5–49.3)
HGB BLD-MCNC: 10.6 G/DL (ref 12–17)
HGB UR QL STRIP.AUTO: ABNORMAL
KETONES UR STRIP-MCNC: NEGATIVE MG/DL
LEUKOCYTE ESTERASE UR QL STRIP: NEGATIVE
LYMPHOCYTES # BLD AUTO: 2.4 THOUSANDS/ΜL (ref 0.6–4.47)
LYMPHOCYTES NFR BLD AUTO: 33 % (ref 14–44)
MAGNESIUM SERPL-MCNC: 1.8 MG/DL (ref 1.6–2.6)
MCH RBC QN AUTO: 25.6 PG (ref 26.8–34.3)
MCHC RBC AUTO-ENTMCNC: 29.2 G/DL (ref 31.4–37.4)
MCV RBC AUTO: 88 FL (ref 82–98)
MONOCYTES # BLD AUTO: 0.6 THOUSAND/ΜL (ref 0.17–1.22)
MONOCYTES NFR BLD AUTO: 8 % (ref 4–12)
NEUTROPHILS # BLD AUTO: 4.08 THOUSANDS/ΜL (ref 1.85–7.62)
NEUTS SEG NFR BLD AUTO: 56 % (ref 43–75)
NITRITE UR QL STRIP: NEGATIVE
NON-SQ EPI CELLS URNS QL MICRO: ABNORMAL /HPF
PH UR STRIP.AUTO: 6 [PH] (ref 4.5–8)
PLATELET # BLD AUTO: 323 THOUSANDS/UL (ref 149–390)
PMV BLD AUTO: 10.8 FL (ref 8.9–12.7)
POTASSIUM SERPL-SCNC: 4 MMOL/L (ref 3.5–5.3)
PROT UR STRIP-MCNC: NEGATIVE MG/DL
RBC # BLD AUTO: 4.14 MILLION/UL (ref 3.88–5.62)
RBC #/AREA URNS AUTO: ABNORMAL /HPF
SODIUM SERPL-SCNC: 147 MMOL/L (ref 136–145)
SP GR UR STRIP.AUTO: 1.01 (ref 1–1.03)
URATE CRY URNS QL MICRO: ABNORMAL /HPF
UROBILINOGEN UR QL STRIP.AUTO: 0.2 E.U./DL
WBC # BLD AUTO: 7.33 THOUSAND/UL (ref 4.31–10.16)
WBC #/AREA URNS AUTO: ABNORMAL /HPF

## 2018-02-10 PROCEDURE — 85025 COMPLETE CBC W/AUTO DIFF WBC: CPT | Performed by: INTERNAL MEDICINE

## 2018-02-10 PROCEDURE — 99232 SBSQ HOSP IP/OBS MODERATE 35: CPT | Performed by: INTERNAL MEDICINE

## 2018-02-10 PROCEDURE — 81001 URINALYSIS AUTO W/SCOPE: CPT | Performed by: INTERNAL MEDICINE

## 2018-02-10 PROCEDURE — 80048 BASIC METABOLIC PNL TOTAL CA: CPT | Performed by: INTERNAL MEDICINE

## 2018-02-10 PROCEDURE — 83735 ASSAY OF MAGNESIUM: CPT | Performed by: INTERNAL MEDICINE

## 2018-02-10 PROCEDURE — 82948 REAGENT STRIP/BLOOD GLUCOSE: CPT

## 2018-02-10 RX ORDER — DILTIAZEM HYDROCHLORIDE 120 MG/1
120 CAPSULE, COATED, EXTENDED RELEASE ORAL DAILY
Status: DISCONTINUED | OUTPATIENT
Start: 2018-02-11 | End: 2018-02-13 | Stop reason: HOSPADM

## 2018-02-10 RX ADMIN — DEXTROSE AND SODIUM CHLORIDE 100 ML/HR: 5; .2 INJECTION, SOLUTION INTRAVENOUS at 19:45

## 2018-02-10 RX ADMIN — DIVALPROEX SODIUM 250 MG: 250 TABLET, DELAYED RELEASE ORAL at 09:24

## 2018-02-10 RX ADMIN — Medication 325 MG: at 08:00

## 2018-02-10 RX ADMIN — DILTIAZEM HYDROCHLORIDE 120 MG: 120 CAPSULE, COATED, EXTENDED RELEASE ORAL at 09:24

## 2018-02-10 RX ADMIN — DIVALPROEX SODIUM 250 MG: 250 TABLET, DELAYED RELEASE ORAL at 17:40

## 2018-02-10 RX ADMIN — METOPROLOL SUCCINATE 100 MG: 50 TABLET, EXTENDED RELEASE ORAL at 09:24

## 2018-02-10 RX ADMIN — VITAMIN D, TAB 1000IU (100/BT) 1000 UNITS: 25 TAB at 09:24

## 2018-02-10 RX ADMIN — ATORVASTATIN CALCIUM 10 MG: 10 TABLET, FILM COATED ORAL at 09:24

## 2018-02-10 RX ADMIN — RIVAROXABAN 20 MG: 20 TABLET, FILM COATED ORAL at 09:24

## 2018-02-10 RX ADMIN — DEXTROSE AND SODIUM CHLORIDE 100 ML/HR: 5; .2 INJECTION, SOLUTION INTRAVENOUS at 09:21

## 2018-02-10 RX ADMIN — SERTRALINE HYDROCHLORIDE 50 MG: 50 TABLET ORAL at 09:24

## 2018-02-10 NOTE — PROGRESS NOTES
Patient slept well in between intervals of care  Turned and repositioned q2  Chu draining  Denies pain  Resting in bed with call bell in reach  Bed alarm on for patient safety

## 2018-02-10 NOTE — PROGRESS NOTES
Progress Note - Nephrology   Miah Parikh [de-identified] y o  male MRN: [id number]  Unit/Bed#: [address] [place] [address] [id number] Encounter: [id number]    ASSESSMENT AND PLAN:  1  VIVIAN:  Most compatible with poor oral intake, diuretics and Ace inhibitor  His renal function is beginning to improve  Of note, we have no records to determine his baseline renal function  -UA:  1+ proteinuria, 4-10 RBCs and renal tubular epithelial says  -renal ultrasound:  No hydronephrosis, bilateral renal cysts  Recommendations:  -hypotonic fluids  -recheck UA in regards to hematuria  2  Hypernatremia:  Secondary to poor oral intake/dehydration with insensible losses:  Serum sodium continues to improve  Maintain current hypotonic IV fluids  3   Mineral bone disorder:  Phosphorus and magnesium are acceptable  4   Anemia:  Check iron studies  5   Hypertension:  Low normal   Hold parameters for medications  Avoid hypoperfusion in the setting of VIVIAN  6   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        Subjective: The patient is completely disoriented and not verbally responsive  Objective:     Vitals: Blood pressure 123/56, pulse 57, temperature 98 °F (36 7 °C), temperature source Axillary, resp  rate 17, weight 109 kg (240 lb 8 4 oz), SpO2 94 %  ,Body mass index is 32 62 kg/m²      Weight (last 2 days)     Date/Time   Weight    02/10/18 1502  109 (240 52)    02/10/18 0730  108 (237 66)    02/09/18 0743  106 (233 91)    02/08/18 2010  104 (228 4)    02/08/18 1522  106 (233)                Intake/Output Summary (Last 24 hours) at 02/10/18 1615  Last data filed at 02/10/18 1333   Gross per 24 hour   Intake              920 ml   Output             2025 ml   Net            -1105 ml            Physical Exam: General:  Completely confused  Skin:  No acute rash  Eyes:  No scleral icterus  ENT:  Dry mucous membranes  Neck:  Supple, no jugular venous distention  Chest:  Clear to auscultation but poor effort  CVS:  No rubs or gallops appreciated  Abdomen:  Soft and nontender with normal bowel sounds  Extremities:  No edema and no cyanosis  Neuro:  He seems to move all extremities but poorly cooperative  Psych:  Nonverbal                 Medications:    Scheduled Meds:  Current Facility-Administered Medications:  acetaminophen 650 mg Oral Q6H PRN Kianna Plaza MD    atorvastatin 10 mg Oral Daily Kianna Plaza MD    cholecalciferol 1,000 Units Oral Daily Kianna Plaza MD    dextrose 5 % and sodium chloride 0 2 % 100 mL/hr Intravenous Continuous Daniel Segovia MD Last Rate: 100 mL/hr (02/10/18 0921)   diltiazem 120 mg Oral Daily Dave Gray MD    divalproex sodium 250 mg Oral BID Kianna Plaza MD    ferrous sulfate 325 mg Oral Daily With Breakfast Kianna Plaza MD    insulin lispro 1-6 Units Subcutaneous TID AC Dave Gray MD    metoprolol succinate 100 mg Oral Daily Dave Gray MD    rivaroxaban 20 mg Oral Daily With Breakfast Kianna Plaza MD    sertraline 50 mg Oral Daily Kianna Plaza MD        PRN Meds:   acetaminophen    Continuous Infusions:  dextrose 5 % and sodium chloride 0 2 % 100 mL/hr Last Rate: 100 mL/hr (02/10/18 0921)       Lab, Imaging and other studies: I have personally reviewed pertinent labs    Laboratory Results:    Results from last 7 days  Lab Units 02/10/18  0440 02/09/18  0450 02/08/18  1530   WBC Thousand/uL 7 33 8 50 11 62*   HEMOGLOBIN g/dL 10 6* 10 9* 12 1   HEMATOCRIT % 36 3* 37 8 39 9   PLATELETS Thousands/uL 323 317 375   SODIUM mmol/L 147* 152* 149*   POTASSIUM mmol/L 4 0 4 3 5 2   CHLORIDE mmol/L 111* 117* 111*   CO2 mmol/L 30 28 29   BUN mg/dL 26* 35* 39*   CREATININE mg/dL 1 94* 2 68* 3 65*   CALCIUM mg/dL 7 7* 7 8* 8 5   MAGNESIUM mg/dL 1 8 2 0  --    PHOSPHORUS mg/dL  --  4 1  --    TOTAL PROTEIN g/dL  --   --  7 4   GLUCOSE RANDOM mg/dL 80 72 109     Urinalysis: Lab Results   Component Value Date    COLORU Yellow 02/08/2018    CLARITYU Slightly Cloudy 02/08/2018    SPECGRAV 1 025 02/08/2018    PHUR 5 5 02/08/2018    LEUKOCYTESUR Small (A) 02/08/2018    NITRITE Negative 02/08/2018    PROTEINUA 30 (1+) (A) 02/08/2018    GLUCOSEU Negative 02/08/2018    KETONESU Trace (A) 02/08/2018    BILIRUBINUR Interference- unable to analyze (A) 02/08/2018    BLOODU Trace-lysed (A) 02/08/2018     ABGs: No results found for: [facility]  Radiology review:     Portions of the record may have been created with voice recognition software   Occasional wrong word or "sound a like" substitutions may have occurred due to the inherent limitations of voice recognition software   Read the chart carefully and recognize, using context, where substitutions have occurred

## 2018-02-10 NOTE — OCCUPATIONAL THERAPY NOTE
Occupational Therapy         Patient Name: Jean Carlos Vicente  SFJIT'C Date: 2/10/2018    Patient screened for OT services, patient is LTC resident of [address] [place] [address],[address] SNF, per old records patient is dependent with all care and is a babak lift in/out of wheelchair  Patient does not require further OT evaluation in acute care, recommend return to SNF post discharge    DC OT    Antoine Bowman MS, OTR/L

## 2018-02-10 NOTE — PROGRESS NOTES
Progress Note - Leta Alvarado [de-identified] y o  male MRN: [id number]  Unit/Bed#: [address] [place] [address] [id number] Encounter: [id number]    Assessment:  Principal Problem:    VIVIAN (acute kidney injury) ([facility] )  Active Problems:    UTI (urinary tract infection) due to urinary indwelling catheter ([facility] )    History of pulmonary embolus (PE)    Essential hypertension    Encephalopathy, metabolic    Paroxysmal atrial fibrillation ([facility] )    Senile dementia with behavioral disturbance    Altered mental status  Resolved Problems:    * No resolved hospital problems  *      Plan:  · Acute kidney injury on chronic kidney disease stage 3  · Acute encephalopathy secondary to above  · Hypernatremia  On IV fluids  Renal ultrasound  Follow-up renal function  Avoid nephrotoxins medications/hypotension  Nephrology consult appreciated  Hold Lasix and Ace inhibitors  Sodium is improving  · UTI ? Colonization  Will hold off antibiotics  ID input appreciated  They also doubt sepsis and think this is asymptomatic colonization  · Paroxysmal atrial fibrillation, chronic diastolic heart  On Xarelto for anticoagulation  Hold Lasix and lisinopril  Will restart on Toprol-XL and Cardizem CD for rate control  · Speech/swallow evaluation noted  On level 1 diet with nectar thick liquids  · DVT prophylaxis  Subjective:   Patient is seen and examined at bedside  Denies any complaints  Afebrile  Objective:   Vitals: Blood pressure 106/73, pulse 58, temperature 98 5 °F (36 9 °C), temperature source Tympanic, resp  rate 18, weight 108 kg (237 lb 10 5 oz), SpO2 90 %  ,Body mass index is 32 23 kg/m²    SPO2 RA Rest    Flowsheet Row ED to Hosp-Admission (Current) from 2/8/2018 in [facility]   SpO2  90 %   SpO2 Activity  At Rest   O2 Device  None (Room air)   O2 Flow Rate  2 L/min        I&O:   Intake/Output Summary (Last 24 hours) at 02/10/18 0842  Last data filed at 02/10/18 0543   Gross per 24 hour   Intake              120 ml Output             2025 ml   Net            -1905 ml       Physical Exam:    General- Alert, lying comfortably in bed  Not in any acute distress  HEENT- CITLALLI, EOM intact  Neck- Supple, No JVD  CVS- irregular, S1 and S2 normal   Chest- Bilateral Air entry, No rhochi, crackles or wheezing present  Abdomen- soft, nontender, not distended, suprapubic catheter in place, BS+  Extremities-  No pedal edema, No calf tenderness  CNS- No focal deficits present  Invasive Devices     Peripheral Intravenous Line            Peripheral IV 02/09/18 Right Hand 1 day          Drain            Suprapubic Catheter 18 Fr  14 days                      Social History  reviewed  History reviewed  No pertinent family history   reviewed    Meds:  Current Facility-Administered Medications   Medication Dose Route Frequency Provider Last Rate Last Dose    acetaminophen (TYLENOL) tablet 650 mg  650 mg Oral Q6H PRN Kianna Dempsey MD        atorvastatin (LIPITOR) tablet 10 mg  10 mg Oral Daily Kianna Dempsey MD   10 mg at 02/09/18 0932    cholecalciferol (VITAMIN D3) tablet 1,000 Units  1,000 Units Oral Daily Chairty TOMLIN MD   1,000 Units at 02/09/18 0932    dextrose 5 % and sodium chloride 0 2 % infusion  100 mL/hr Intravenous Continuous Jennifer Alicea  mL/hr at 02/09/18 2311 100 mL/hr at 02/09/18 2311    diltiazem (CARDIZEM CD) 24 hr capsule 120 mg  120 mg Oral Daily Harley Taylor MD   120 mg at 02/09/18 1239    divalproex sodium (DEPAKOTE) EC tablet 250 mg  250 mg Oral BID Kianna Dempsey MD   250 mg at 02/09/18 1747    ferrous sulfate tablet 325 mg  325 mg Oral Daily With Breakfast Kianna Dempsey MD   325 mg at 02/09/18 0932    insulin lispro (HumaLOG) 100 units/mL subcutaneous injection 1-6 Units  1-6 Units Subcutaneous TID [facility] Harley Taylor MD   1 Units at 02/09/18 1744    metoprolol succinate (TOPROL-XL) 24 hr tablet 100 mg  100 mg Oral Daily Harley Taylor MD   100 mg at 02/09/18 1239    rivaroxaban (XARELTO) tablet 20 mg  20 mg Oral Daily With Breakfast Kianna Moser MD   20 mg at 02/09/18 0932    sertraline (ZOLOFT) tablet 50 mg  50 mg Oral Daily Kianna Moser MD   50 mg at 02/09/18 0932      Prescriptions Prior to Admission   Medication    LORazepam (ATIVAN) 0 5 mg tablet    acetaminophen (TYLENOL) 325 mg tablet    atorvastatin (LIPITOR) 10 mg tablet    cholecalciferol (VITAMIN D3) 1,000 units tablet    diltiazem (DILACOR XR) 120 MG 24 hr capsule    divalproex sodium (DEPAKOTE) 250 mg EC tablet    ferrous sulfate 325 (65 Fe) mg tablet    furosemide (LASIX) 80 mg tablet    hydrALAZINE (APRESOLINE) 50 mg tablet    lisinopril (ZESTRIL) 10 mg tablet    metoprolol succinate (TOPROL-XL) 100 mg 24 hr tablet    potassium chloride (K-DUR,KLOR-CON) 20 mEq tablet    risperiDONE (RisperDAL) 0 25 mg tablet    rivaroxaban (XARELTO) 20 mg tablet    sertraline (ZOLOFT) 100 mg tablet       Labs:    Results from last 7 days  Lab Units 02/10/18  0440 02/09/18  0450 02/08/18  1530   WBC Thousand/uL 7 33 8 50 11 62*   HEMOGLOBIN g/dL 10 6* 10 9* 12 1   HEMATOCRIT % 36 3* 37 8 39 9   PLATELETS Thousands/uL 323 317 375   NEUTROS PCT % 56  --  79*   LYMPHS PCT % 33  --  14   MONOS PCT % 8  --  5   EOS PCT % 3  --  2       Results from last 7 days  Lab Units 02/10/18  0440 02/09/18  0450 02/08/18  1530   SODIUM mmol/L 147* 152* 149*   POTASSIUM mmol/L 4 0 4 3 5 2   CHLORIDE mmol/L 111* 117* 111*   CO2 mmol/L 30 28 29   BUN mg/dL 26* 35* 39*   CREATININE mg/dL 1 94* 2 68* 3 65*   CALCIUM mg/dL 7 7* 7 8* 8 5   TOTAL PROTEIN g/dL  --   --  7 4   BILIRUBIN TOTAL mg/dL  --   --  0 30   ALK PHOS U/L  --   --  75   ALT U/L  --   --  31   AST U/L  --   --  24   GLUCOSE RANDOM mg/dL 80 72 109     Lab Results   Component Value Date    TROPONINI 0 03 02/08/2018         Lab Results   Component Value Date    BLOODCX No Growth After 5 Days  01/26/2018    BLOODCX No Growth After 5 Days   01/26/2018 URINECX No Growth <1000 cfu/mL 02/08/2018    URINECX (A) 01/27/2018     10,000-19,000 cfu/ml Methicillin Resistant Staphylococcus aureus    URINECX 10,000-19,000 cfu/ml Klebsiella pneumoniae ESBL (A) 01/27/2018    URINECX [id number] cfu/ml Enterococcus faecalis (A) 01/27/2018         Imaging:  Results for orders placed during the hospital encounter of 02/08/18   XR chest 1 view portable    Narrative CHEST     INDICATION:  Confusion    COMPARISON:  1/20/2018 at 1:59 PM     VIEWS:   AP frontal    IMAGES:  2    FINDINGS:      Left transvenous defibrillator pacemaker leads are unchanged in position  Mild cardiomegaly appears stable  Lung volumes are low  Bibasilar atelectasis is present  No pneumothorax  Visualized osseous structures appear within normal limits for the patient's age  Impression Low lung volumes with bibasilar atelectasis  Workstation performed: [id number]       Results for orders placed during the hospital encounter of 01/26/18   XR chest pa & lateral    Narrative CHEST     INDICATION:  Cough    COMPARISON:  1/26/2018    VIEWS:  Frontal and lateral projections    IMAGES:  2    FINDINGS:  There is a stable pacer/ICD  Cardiomediastinal silhouette appears unremarkable  The lungs are clear  No pneumothorax or pleural effusion  Visualized osseous structures appear within normal limits for the patient's age  Impression No active pulmonary disease  Workstation performed: [id number]         Labs & Imaging: I have personally reviewed pertinent reports        VTE Pharmacologic Prophylaxis:  Xarelto  VTE Mechanical Prophylaxis: sequential compression device    Code Status:   Level 3 - DNAR and DNI      "This note has been constructed using a voice recognition system"      Real Hart MD  2/10/2018,8:42 AM

## 2018-02-11 PROBLEM — E87.0 HYPEROSMOLALITY AND/OR HYPERNATREMIA: Status: ACTIVE | Noted: 2018-02-11

## 2018-02-11 LAB
ANION GAP SERPL CALCULATED.3IONS-SCNC: 6 MMOL/L (ref 4–13)
BASOPHILS # BLD AUTO: 0.02 THOUSANDS/ΜL (ref 0–0.1)
BASOPHILS NFR BLD AUTO: 0 % (ref 0–1)
BUN SERPL-MCNC: 19 MG/DL (ref 5–25)
CALCIUM SERPL-MCNC: 7.8 MG/DL (ref 8.3–10.1)
CHLORIDE SERPL-SCNC: 109 MMOL/L (ref 100–108)
CO2 SERPL-SCNC: 29 MMOL/L (ref 21–32)
CREAT SERPL-MCNC: 1.53 MG/DL (ref 0.6–1.3)
EOSINOPHIL # BLD AUTO: 0.26 THOUSAND/ΜL (ref 0–0.61)
EOSINOPHIL NFR BLD AUTO: 4 % (ref 0–6)
ERYTHROCYTE [DISTWIDTH] IN BLOOD BY AUTOMATED COUNT: 15.3 % (ref 11.6–15.1)
FERRITIN SERPL-MCNC: 83 NG/ML (ref 8–388)
GFR SERPL CREATININE-BSD FRML MDRD: 42 ML/MIN/1.73SQ M
GLUCOSE SERPL-MCNC: 107 MG/DL (ref 65–140)
GLUCOSE SERPL-MCNC: 109 MG/DL (ref 65–140)
GLUCOSE SERPL-MCNC: 136 MG/DL (ref 65–140)
GLUCOSE SERPL-MCNC: 158 MG/DL (ref 65–140)
GLUCOSE SERPL-MCNC: 84 MG/DL (ref 65–140)
HCT VFR BLD AUTO: 34.7 % (ref 36.5–49.3)
HGB BLD-MCNC: 10.6 G/DL (ref 12–17)
IRON SATN MFR SERPL: 15 %
IRON SERPL-MCNC: 33 UG/DL (ref 65–175)
LYMPHOCYTES # BLD AUTO: 2.35 THOUSANDS/ΜL (ref 0.6–4.47)
LYMPHOCYTES NFR BLD AUTO: 36 % (ref 14–44)
MCH RBC QN AUTO: 26.1 PG (ref 26.8–34.3)
MCHC RBC AUTO-ENTMCNC: 30.5 G/DL (ref 31.4–37.4)
MCV RBC AUTO: 86 FL (ref 82–98)
MONOCYTES # BLD AUTO: 0.52 THOUSAND/ΜL (ref 0.17–1.22)
MONOCYTES NFR BLD AUTO: 8 % (ref 4–12)
NEUTROPHILS # BLD AUTO: 3.46 THOUSANDS/ΜL (ref 1.85–7.62)
NEUTS SEG NFR BLD AUTO: 52 % (ref 43–75)
PLATELET # BLD AUTO: 302 THOUSANDS/UL (ref 149–390)
PMV BLD AUTO: 10.8 FL (ref 8.9–12.7)
POTASSIUM SERPL-SCNC: 3.7 MMOL/L (ref 3.5–5.3)
RBC # BLD AUTO: 4.06 MILLION/UL (ref 3.88–5.62)
SODIUM SERPL-SCNC: 144 MMOL/L (ref 136–145)
TIBC SERPL-MCNC: 217 UG/DL (ref 250–450)
WBC # BLD AUTO: 6.61 THOUSAND/UL (ref 4.31–10.16)

## 2018-02-11 PROCEDURE — 82948 REAGENT STRIP/BLOOD GLUCOSE: CPT

## 2018-02-11 PROCEDURE — 99232 SBSQ HOSP IP/OBS MODERATE 35: CPT | Performed by: INTERNAL MEDICINE

## 2018-02-11 PROCEDURE — 80048 BASIC METABOLIC PNL TOTAL CA: CPT | Performed by: INTERNAL MEDICINE

## 2018-02-11 PROCEDURE — 83540 ASSAY OF IRON: CPT | Performed by: INTERNAL MEDICINE

## 2018-02-11 PROCEDURE — 82728 ASSAY OF FERRITIN: CPT | Performed by: INTERNAL MEDICINE

## 2018-02-11 PROCEDURE — 85025 COMPLETE CBC W/AUTO DIFF WBC: CPT | Performed by: INTERNAL MEDICINE

## 2018-02-11 PROCEDURE — 83550 IRON BINDING TEST: CPT | Performed by: INTERNAL MEDICINE

## 2018-02-11 RX ADMIN — RIVAROXABAN 20 MG: 20 TABLET, FILM COATED ORAL at 08:27

## 2018-02-11 RX ADMIN — DILTIAZEM HYDROCHLORIDE 120 MG: 120 CAPSULE, COATED, EXTENDED RELEASE ORAL at 08:29

## 2018-02-11 RX ADMIN — DIVALPROEX SODIUM 250 MG: 250 TABLET, DELAYED RELEASE ORAL at 08:26

## 2018-02-11 RX ADMIN — INSULIN LISPRO 1 UNITS: 100 INJECTION, SOLUTION INTRAVENOUS; SUBCUTANEOUS at 11:29

## 2018-02-11 RX ADMIN — Medication 325 MG: at 08:27

## 2018-02-11 RX ADMIN — DEXTROSE AND SODIUM CHLORIDE 100 ML/HR: 5; .2 INJECTION, SOLUTION INTRAVENOUS at 05:39

## 2018-02-11 RX ADMIN — DIVALPROEX SODIUM 250 MG: 250 TABLET, DELAYED RELEASE ORAL at 17:05

## 2018-02-11 RX ADMIN — DEXTROSE AND SODIUM CHLORIDE 75 ML/HR: 5; .2 INJECTION, SOLUTION INTRAVENOUS at 16:43

## 2018-02-11 RX ADMIN — ATORVASTATIN CALCIUM 10 MG: 10 TABLET, FILM COATED ORAL at 08:27

## 2018-02-11 RX ADMIN — VITAMIN D, TAB 1000IU (100/BT) 1000 UNITS: 25 TAB at 08:27

## 2018-02-11 RX ADMIN — SERTRALINE HYDROCHLORIDE 50 MG: 50 TABLET ORAL at 08:29

## 2018-02-11 NOTE — PROGRESS NOTES
Progress Note - Nephrology   Onetha Ran [de-identified] y o  male MRN: [id number]  Unit/Bed#: [address] [place] [address] 205-01 Encounter: [id number]    ASSESSMENT AND PLAN:  42-year-old male with a history of dementia, urinary retention with chronic suprapubic catheter, history of PE on anticoagulation who presented with a change in mental status VIVIAN and hypernatremia:    1  VIVIAN:  Most compatible with poor oral intake, diuretics and Ace inhibitor  His renal function is beginning to improve  Of note, we have no records to determine his baseline renal function  -UA:  1+ proteinuria, 4-10 RBCs and renal tubular epithelial says  -renal ultrasound:  No hydronephrosis, bilateral renal cysts  Recommendations:  -hypotonic fluids  -recheck UA in regards to hematuria:  Now 2-4 RBCs, no proteinuria  2   Hypernatremia:  Secondary to poor oral intake/dehydration with insensible losses:  Serum sodium continues to improve, and is now normal   Maintain current hypotonic IV fluids, but decrease the rate to 75 mL  Recheck labs in a m  3   Mineral bone disorder:  Phosphorus and magnesium are acceptable  4   Anemia:  Check iron studies  5   Hypertension:  Low normal   Hold parameters for medications  Avoid hypoperfusion in the setting of VIVIAN  6   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           Subjective: The patient is more alert today and actually communicates verbally  Still very disoriented  Denies all symptoms  Objective:     Vitals: Blood pressure 105/79, pulse 60, temperature 98 5 °F (36 9 °C), temperature source Axillary, resp  rate 18, weight 110 kg (242 lb 8 1 oz), SpO2 98 %  ,Body mass index is 32 89 kg/m²      Weight (last 2 days)     Date/Time   Weight    02/11/18 0711  110 (242 51)    02/10/18 1502  109 (240 52)    02/10/18 0730  108 (237 66)    02/09/18 0743  106 (233 91)                Intake/Output Summary (Last 24 hours) at 02/11/18 0734  Last data filed at 02/11/18 0535   Gross per 24 hour   Intake 2790 ml   Output             1650 ml   Net             1140 ml            Physical Exam: General:  No acute distress  Skin:  No acute rash  Eyes:  No scleral icterus  ENT:  Moist mucous membranes  Neck:  Supple, no jugular venous distention  Chest:  Clear to auscultation but poor effort  CVS:  No rubs or gallops appreciated  Abdomen:  Soft and nontender with normal bowel sounds  Extremities:  No edema no cyanosis  Neuro:  No gross focality but difficult exam uncooperative  Psych:  Alert but not oriented                Medications:    Scheduled Meds:  Current Facility-Administered Medications:  acetaminophen 650 mg Oral Q6H PRN Kianna Wu MD    atorvastatin 10 mg Oral Daily Kianna Wu MD    cholecalciferol 1,000 Units Oral Daily Kianna TOMLIN MD    dextrose 5 % and sodium chloride 0 2 % 100 mL/hr Intravenous Continuous Jen Armstrong MD Last Rate: 100 mL/hr (02/11/18 0539)   diltiazem 120 mg Oral Daily Rivera Garcia MD    divalproex sodium 250 mg Oral BID Kianna Wu MD    ferrous sulfate 325 mg Oral Daily With Breakfast Kianna Wu MD    insulin lispro 1-6 Units Subcutaneous TID AC Sussy Harrington MD    metoprolol succinate 100 mg Oral Daily Sussy Harrington MD    rivaroxaban 20 mg Oral Daily With Breakfast Kianna Wu MD    sertraline 50 mg Oral Daily Kianna Wu MD        PRN Meds:   acetaminophen    Continuous Infusions:  dextrose 5 % and sodium chloride 0 2 % 100 mL/hr Last Rate: 100 mL/hr (02/11/18 0539)       Lab, Imaging and other studies: I have personally reviewed pertinent labs    Laboratory Results:    Results from last 7 days  Lab Units 02/11/18  0446 02/10/18  0440 02/09/18  0450 02/08/18  1530   WBC Thousand/uL 6 61 7 33 8 50 11 62*   HEMOGLOBIN g/dL 10 6* 10 6* 10 9* 12 1   HEMATOCRIT % 34 7* 36 3* 37 8 39 9   PLATELETS Thousands/uL 302 323 317 375   SODIUM mmol/L 144 147* 152* 149*   POTASSIUM mmol/L 3 7 4 0 4 3 5 2   CHLORIDE mmol/L 109* 111* 117* 111*   CO2 mmol/L 29 30 28 29   BUN mg/dL 19 26* 35* 39*   CREATININE mg/dL 1 53* 1 94* 2 68* 3 65*   CALCIUM mg/dL 7 8* 7 7* 7 8* 8 5   MAGNESIUM mg/dL  --  1 8 2 0  --    PHOSPHORUS mg/dL  --   --  4 1  --    TOTAL PROTEIN g/dL  --   --   --  7 4   GLUCOSE RANDOM mg/dL 84 80 72 109     Urinalysis: Lab Results   Component Value Date    COLORU Yellow 02/10/2018    CLARITYU Clear 02/10/2018    SPECGRAV 1 010 02/10/2018    PHUR 6 0 02/10/2018    LEUKOCYTESUR Negative 02/10/2018    NITRITE Negative 02/10/2018    PROTEINUA Negative 02/10/2018    GLUCOSEU Negative 02/10/2018    KETONESU Negative 02/10/2018    BILIRUBINUR Negative 02/10/2018    BLOODU Small (A) 02/10/2018     ABGs: No results found for: [facility]  Radiology review:     Portions of the record may have been created with voice recognition software   Occasional wrong word or "sound a like" substitutions may have occurred due to the inherent limitations of voice recognition software   Read the chart carefully and recognize, using context, where substitutions have occurred

## 2018-02-11 NOTE — PROGRESS NOTES
Holli Stain, per nurse pt usually is given medications whole in pudding  Pt is usually on a mechanical soft diet, with nectar  Gave pt Depakote whole in applesauce, pt swallowed very well  Per Dr Mariola Delgado, repeat speech eval d/t increased alertness and ability to swallow pills without incident

## 2018-02-11 NOTE — PROGRESS NOTES
Progress Note - Ann Marie Diamond [de-identified] y o  male MRN: [id number]  Unit/Bed#: [address] [place] [address] 205-01 Encounter: [id number]    Assessment:  Principal Problem:    VIVIAN (acute kidney injury) ([facility] )  Active Problems:    UTI (urinary tract infection) due to urinary indwelling catheter ([facility] )    History of pulmonary embolus (PE)    Essential hypertension    Encephalopathy, metabolic    Paroxysmal atrial fibrillation (HCC)    Senile dementia with behavioral disturbance    Altered mental status    Hyperosmolality and/or hypernatremia  Resolved Problems:    * No resolved hospital problems  *      Plan:  · Acute kidney injury on chronic kidney disease stage 3  · Acute encephalopathy-resolved  · Hypernatremia-resolved  Nephrology follow-up appreciated  On hypertonic fluids  Renal ultrasound noted-no hydronephrosis, bilateral renal cysts  Avoid nephrotoxins medications/hypotension  Hold Lasix and Ace inhibitors  · Patient does not have UTI  Likely colonization  Will hold off antibiotics  ID input appreciated  · Paroxysmal atrial fibrillation, chronic diastolic heart  On Xarelto for anticoagulation  Continue Toprol-XL and Cardizem CD for rate control  · On level 1 diet with nectar thick liquids  · DVT prophylaxis  Subjective:   Patient is seen and examined at bedside  More awake and alert today  Denies any complaints  Afebrile  All other ROS are negative  Objective:   Vitals: Blood pressure 105/79, pulse 60, temperature 98 5 °F (36 9 °C), temperature source Axillary, resp  rate 18, weight 110 kg (242 lb 8 1 oz), SpO2 98 %  ,Body mass index is 32 89 kg/m²    SPO2 RA Rest    Flowsheet Row ED to Hosp-Admission (Current) from 2/8/2018 in [facility]   SpO2  98 %   SpO2 Activity  At Rest   O2 Device  None (Room air)   O2 Flow Rate  2 L/min        I&O:   Intake/Output Summary (Last 24 hours) at 02/11/18 0811  Last data filed at 02/11/18 0535   Gross per 24 hour   Intake             2790 ml   Output 1650 ml   Net             1140 ml       Physical Exam:    General- Alert, lying comfortably in bed  Not in any acute distress  HEENT- CITLALLI, EOM intact  Neck- Supple, No JVD  CVS- irregular, S1 and S2 normal   Chest- Bilateral Air entry, No rhochi, crackles or wheezing present  Abdomen- soft, nontender, suprapubic catheter in place, no guarding or rigidity, BS+  Extremities-  No pedal edema, No calf tenderness  CNS-  No focal deficits present  Invasive Devices     Peripheral Intravenous Line            Peripheral IV 02/09/18 Right Hand 2 days          Drain            Suprapubic Catheter 18 Fr  15 days                      Social History  reviewed  History reviewed  No pertinent family history   reviewed    Meds:  Current Facility-Administered Medications   Medication Dose Route Frequency Provider Last Rate Last Dose    acetaminophen (TYLENOL) tablet 650 mg  650 mg Oral Q6H PRN Kianna Guevara MD        atorvastatin (LIPITOR) tablet 10 mg  10 mg Oral Daily Kianna Guevara MD   10 mg at 02/10/18 0283    cholecalciferol (VITAMIN D3) tablet 1,000 Units  1,000 Units Oral Daily Kianna Guevara MD   1,000 Units at 02/10/18 0924    dextrose 5 % and sodium chloride 0 2 % infusion  75 mL/hr Intravenous Continuous Veronique Eid  mL/hr at 02/11/18 0539 100 mL/hr at 02/11/18 0539    diltiazem (CARDIZEM CD) 24 hr capsule 120 mg  120 mg Oral Daily Veronique Eid MD        divalproex sodium (DEPAKOTE) EC tablet 250 mg  250 mg Oral BID Kianna Guevara MD   250 mg at 02/10/18 1740    ferrous sulfate tablet 325 mg  325 mg Oral Daily With Breakfast Kianna Guevara MD   325 mg at 02/10/18 0800    insulin lispro (HumaLOG) 100 units/mL subcutaneous injection 1-6 Units  1-6 Units Subcutaneous TID [facility] Sweetie Ragland MD   1 Units at 02/09/18 1744    metoprolol succinate (TOPROL-XL) 24 hr tablet 100 mg  100 mg Oral Daily Sweetie Ragland MD   100 mg at 02/10/18 7909    rivaroxaban (Crys Jiang) tablet 20 mg  20 mg Oral Daily With Breakfast Kianna Guevara MD   20 mg at 02/10/18 9465    sertraline (ZOLOFT) tablet 50 mg  50 mg Oral Daily Kianna Guevara MD   50 mg at 02/10/18 9676      Prescriptions Prior to Admission   Medication    LORazepam (ATIVAN) 0 5 mg tablet    acetaminophen (TYLENOL) 325 mg tablet    atorvastatin (LIPITOR) 10 mg tablet    cholecalciferol (VITAMIN D3) 1,000 units tablet    diltiazem (DILACOR XR) 120 MG 24 hr capsule    divalproex sodium (DEPAKOTE) 250 mg EC tablet    ferrous sulfate 325 (65 Fe) mg tablet    furosemide (LASIX) 80 mg tablet    hydrALAZINE (APRESOLINE) 50 mg tablet    lisinopril (ZESTRIL) 10 mg tablet    metoprolol succinate (TOPROL-XL) 100 mg 24 hr tablet    potassium chloride (K-DUR,KLOR-CON) 20 mEq tablet    risperiDONE (RisperDAL) 0 25 mg tablet    rivaroxaban (XARELTO) 20 mg tablet    sertraline (ZOLOFT) 100 mg tablet       Labs:    Results from last 7 days  Lab Units 02/11/18  0446 02/10/18  0440 02/09/18  0450 02/08/18  1530   WBC Thousand/uL 6 61 7 33 8 50 11 62*   HEMOGLOBIN g/dL 10 6* 10 6* 10 9* 12 1   HEMATOCRIT % 34 7* 36 3* 37 8 39 9   PLATELETS Thousands/uL 302 323 317 375   NEUTROS PCT % 52 56  --  79*   LYMPHS PCT % 36 33  --  14   MONOS PCT % 8 8  --  5   EOS PCT % 4 3  --  2       Results from last 7 days  Lab Units 02/11/18  0446 02/10/18  0440 02/09/18  0450 02/08/18  1530   SODIUM mmol/L 144 147* 152* 149*   POTASSIUM mmol/L 3 7 4 0 4 3 5 2   CHLORIDE mmol/L 109* 111* 117* 111*   CO2 mmol/L 29 30 28 29   BUN mg/dL 19 26* 35* 39*   CREATININE mg/dL 1 53* 1 94* 2 68* 3 65*   CALCIUM mg/dL 7 8* 7 7* 7 8* 8 5   TOTAL PROTEIN g/dL  --   --   --  7 4   BILIRUBIN TOTAL mg/dL  --   --   --  0 30   ALK PHOS U/L  --   --   --  75   ALT U/L  --   --   --  31   AST U/L  --   --   --  24   GLUCOSE RANDOM mg/dL 84 80 72 109     Lab Results   Component Value Date    TROPONINI 0 03 02/08/2018         Lab Results   Component Value Date BLOODCX No Growth After 5 Days  01/26/2018    BLOODCX No Growth After 5 Days  01/26/2018    URINECX No Growth <1000 cfu/mL 02/08/2018    URINECX (A) 01/27/2018     10,000-19,000 cfu/ml Methicillin Resistant Staphylococcus aureus    URINECX 10,000-19,000 cfu/ml Klebsiella pneumoniae ESBL (A) 01/27/2018    URINECX 7623-6057 cfu/ml Enterococcus faecalis (A) 01/27/2018         Imaging:  Results for orders placed during the hospital encounter of 02/08/18   XR chest 1 view portable    Narrative CHEST     INDICATION:  Confusion    COMPARISON:  1/20/2018 at 1:59 PM     VIEWS:   AP frontal    IMAGES:  2    FINDINGS:      Left transvenous defibrillator pacemaker leads are unchanged in position  Mild cardiomegaly appears stable  Lung volumes are low  Bibasilar atelectasis is present  No pneumothorax  Visualized osseous structures appear within normal limits for the patient's age  Impression Low lung volumes with bibasilar atelectasis  Workstation performed: [id number]       Results for orders placed during the hospital encounter of 01/26/18   XR chest pa & lateral    Narrative CHEST     INDICATION:  Cough    COMPARISON:  1/26/2018    VIEWS:  Frontal and lateral projections    IMAGES:  2    FINDINGS:  There is a stable pacer/ICD  Cardiomediastinal silhouette appears unremarkable  The lungs are clear  No pneumothorax or pleural effusion  Visualized osseous structures appear within normal limits for the patient's age  Impression No active pulmonary disease  Workstation performed: [id number]         Labs & Imaging: I have personally reviewed pertinent reports  VTE Pharmacologic Prophylaxis:  Xarelto    VTE Mechanical Prophylaxis: sequential compression device    Code Status:   Level 3 - DNAR and DNI      "This note has been constructed using a voice recognition system"      Melita Bal MD  2/11/2018,8:11 AM

## 2018-02-12 PROBLEM — T83.511A UTI (URINARY TRACT INFECTION) DUE TO URINARY INDWELLING CATHETER (HCC): Status: RESOLVED | Noted: 2018-01-27 | Resolved: 2018-02-12

## 2018-02-12 PROBLEM — N39.0 UTI (URINARY TRACT INFECTION) DUE TO URINARY INDWELLING CATHETER (HCC): Status: RESOLVED | Noted: 2018-01-27 | Resolved: 2018-02-12

## 2018-02-12 PROBLEM — E86.0 DEHYDRATION: Status: ACTIVE | Noted: 2018-02-12

## 2018-02-12 PROBLEM — A41.9 SEPSIS (HCC): Status: RESOLVED | Noted: 2018-01-27 | Resolved: 2018-02-12

## 2018-02-12 LAB
ANION GAP SERPL CALCULATED.3IONS-SCNC: 5 MMOL/L (ref 4–13)
BUN SERPL-MCNC: 14 MG/DL (ref 5–25)
CALCIUM SERPL-MCNC: 7.7 MG/DL (ref 8.3–10.1)
CHLORIDE SERPL-SCNC: 108 MMOL/L (ref 100–108)
CO2 SERPL-SCNC: 29 MMOL/L (ref 21–32)
CREAT SERPL-MCNC: 1.35 MG/DL (ref 0.6–1.3)
GFR SERPL CREATININE-BSD FRML MDRD: 49 ML/MIN/1.73SQ M
GLUCOSE SERPL-MCNC: 106 MG/DL (ref 65–140)
GLUCOSE SERPL-MCNC: 133 MG/DL (ref 65–140)
GLUCOSE SERPL-MCNC: 162 MG/DL (ref 65–140)
GLUCOSE SERPL-MCNC: 74 MG/DL (ref 65–140)
GLUCOSE SERPL-MCNC: 82 MG/DL (ref 65–140)
MAGNESIUM SERPL-MCNC: 1.8 MG/DL (ref 1.6–2.6)
POTASSIUM SERPL-SCNC: 3.6 MMOL/L (ref 3.5–5.3)
SODIUM SERPL-SCNC: 142 MMOL/L (ref 136–145)

## 2018-02-12 PROCEDURE — 80048 BASIC METABOLIC PNL TOTAL CA: CPT | Performed by: INTERNAL MEDICINE

## 2018-02-12 PROCEDURE — 99232 SBSQ HOSP IP/OBS MODERATE 35: CPT | Performed by: INTERNAL MEDICINE

## 2018-02-12 PROCEDURE — 82948 REAGENT STRIP/BLOOD GLUCOSE: CPT

## 2018-02-12 PROCEDURE — 83735 ASSAY OF MAGNESIUM: CPT | Performed by: INTERNAL MEDICINE

## 2018-02-12 RX ORDER — POTASSIUM CHLORIDE 20 MEQ/1
40 TABLET, EXTENDED RELEASE ORAL ONCE
Status: COMPLETED | OUTPATIENT
Start: 2018-02-12 | End: 2018-02-12

## 2018-02-12 RX ADMIN — VITAMIN D, TAB 1000IU (100/BT) 1000 UNITS: 25 TAB at 08:49

## 2018-02-12 RX ADMIN — METOPROLOL SUCCINATE 100 MG: 50 TABLET, EXTENDED RELEASE ORAL at 08:48

## 2018-02-12 RX ADMIN — ATORVASTATIN CALCIUM 10 MG: 10 TABLET, FILM COATED ORAL at 08:49

## 2018-02-12 RX ADMIN — DIVALPROEX SODIUM 250 MG: 250 TABLET, DELAYED RELEASE ORAL at 08:49

## 2018-02-12 RX ADMIN — POTASSIUM CHLORIDE 40 MEQ: 20 TABLET, EXTENDED RELEASE ORAL at 09:04

## 2018-02-12 RX ADMIN — SERTRALINE HYDROCHLORIDE 50 MG: 50 TABLET ORAL at 08:49

## 2018-02-12 RX ADMIN — RIVAROXABAN 20 MG: 20 TABLET, FILM COATED ORAL at 08:48

## 2018-02-12 RX ADMIN — DILTIAZEM HYDROCHLORIDE 120 MG: 120 CAPSULE, COATED, EXTENDED RELEASE ORAL at 08:48

## 2018-02-12 RX ADMIN — ACETAMINOPHEN 650 MG: 325 TABLET, FILM COATED ORAL at 08:49

## 2018-02-12 RX ADMIN — Medication 325 MG: at 08:47

## 2018-02-12 RX ADMIN — DIVALPROEX SODIUM 250 MG: 250 TABLET, DELAYED RELEASE ORAL at 18:28

## 2018-02-12 RX ADMIN — DEXTROSE AND SODIUM CHLORIDE 75 ML/HR: 5; .2 INJECTION, SOLUTION INTRAVENOUS at 06:18

## 2018-02-12 NOTE — CASE MANAGEMENT
Continued Stay Review    Date: 2/12/18    Vital Signs: /75 (BP Location: Left arm)   Pulse 72   Temp 98 1 °F (36 7 °C) (Oral)   Resp 18   Wt 110 kg (242 lb 8 1 oz)   SpO2 95%   BMI 32 89 kg/m²     Medications:   Scheduled Meds:   Current Facility-Administered Medications:  acetaminophen 650 mg Oral Q6H PRN Kianna Dillon MD   atorvastatin 10 mg Oral Daily Kianna Dillon MD   cholecalciferol 1,000 Units Oral Daily Kianna Dillon MD   diltiazem 120 mg Oral Daily Benito Pulliam MD   divalproex sodium 250 mg Oral BID Kianna Dillon MD   ferrous sulfate 325 mg Oral Daily With Breakfast Kianna Dillon MD   insulin lispro 1-6 Units Subcutaneous TID AC Deedee Myers MD   metoprolol succinate 100 mg Oral Daily Deedee Myers MD   rivaroxaban 20 mg Oral Daily With Breakfast Kianna Dillon MD   sertraline 50 mg Oral Daily Kianna Dillon MD     Continuous Infusions:    PRN Meds:   acetaminophen    Abnormal Labs/Diagnostic Results:   CR 1 35 GFR 49     Age/Sex: [de-identified] y o  male     Assessment/Plan:     Principal Problem:    VIVIAN (acute kidney injury) ([facility] )  Active Problems:    Encephalopathy, metabolic    Senile dementia with behavioral disturbance    Essential hypertension    Paroxysmal atrial fibrillation (HCC)    Altered mental status    Hyperosmolality and/or hypernatremia    History of pulmonary embolus (PE)  Resolved Problems:    UTI (urinary tract infection) due to urinary indwelling catheter ([facility] )     Plan:  · Acute ehnlpfzkbaqtia-hovwssltckclm-yrwmetidb numbers with sodium now 142 was 152 after admission-will cap IV and recheck labs in the a m  but hope to have him stable for discharge within 24-48 hours  · Acute kidney injury with chronic kidney disease creatinine was 3 65 on admission-down to 1 35 today  · Dementia with altered mental status-improved alertness since risperidone has been decreased-also on Depakote-is SNF patient with bed to wheelchair via [place] lift according to nursing staff  · Urine-felt by ID to be colonization not active infection-has chronic indwelling catheter-antibiotics currently on home       Discharge Plan: [facility]ALET[facility]

## 2018-02-12 NOTE — PROGRESS NOTES
Progress Note - Leta Alvarado [de-identified] y o  male MRN: [id number]    Unit/Bed#: [address] [place] [address] 205-01 Encounter: [id number]      Assessment:  Principal Problem:    VIVIAN (acute kidney injury) ([facility] )  Active Problems:    Encephalopathy, metabolic    Senile dementia with behavioral disturbance    Essential hypertension    Paroxysmal atrial fibrillation (HCC)    Altered mental status    Hyperosmolality and/or hypernatremia    History of pulmonary embolus (PE)  Resolved Problems:    UTI (urinary tract infection) due to urinary indwelling catheter ([facility] )        Plan:  · Acute gljkloypxnzpmy-aljmsmhzvuump-oirdtuvqt numbers with sodium now 142 was 152 after admission-will cap IV and recheck labs in the a m  but hope to have him stable for discharge within 24-48 hours  · Acute kidney injury with chronic kidney disease creatinine was 3 65 on admission-down to 1 35 today  · Dementia with altered mental status-improved alertness since risperidone has been decreased-also on Depakote-is SNF patient with bed to wheelchair via [employer] lift according to nursing staff  · Urine-felt by ID to be colonization not active infection-has chronic indwelling catheter-antibiotics currently on home    Subjective:   Patient opens eyes and had 1-2 word conversation with me but not always focused on question at hand  He is cooperative at present with nursing student as well as myself  He did take his meds with applesauce without difficulty  ROS  Comprehensive system review negative other than noted above    Objective:     Vitals: Blood pressure 148/75, pulse 72, temperature 98 1 °F (36 7 °C), temperature source Oral, resp  rate 18, weight 110 kg (242 lb 8 1 oz), SpO2 95 %  ,Body mass index is 32 89 kg/m²    Current Facility-Administered Medications   Medication Dose Route Frequency Provider Last Rate Last Dose    acetaminophen (TYLENOL) tablet 650 mg  650 mg Oral Q6H PRN Kianna Taylor MD   650 mg at 02/12/18 0849    atorvastatin (LIPITOR) tablet 10 mg  10 mg Oral Daily Kianna Bey MD   10 mg at 02/12/18 0849    cholecalciferol (VITAMIN D3) tablet 1,000 Units  1,000 Units Oral Daily Kianna Bye MD   1,000 Units at 02/12/18 0849    dextrose 5 % and sodium chloride 0 2 % infusion  75 mL/hr Intravenous Continuous Lisa Goss MD 75 mL/hr at 02/12/18 0618 75 mL/hr at 02/12/18 0618    diltiazem (CARDIZEM CD) 24 hr capsule 120 mg  120 mg Oral Daily Lisa Goss MD   120 mg at 02/12/18 0848    divalproex sodium (DEPAKOTE) EC tablet 250 mg  250 mg Oral BID Kianna Bey MD   250 mg at 02/12/18 0849    ferrous sulfate tablet 325 mg  325 mg Oral Daily With Breakfast Kianna Bey MD   325 mg at 02/12/18 0847    insulin lispro (HumaLOG) 100 units/mL subcutaneous injection 1-6 Units  1-6 Units Subcutaneous TID [facility] Saran Randall MD   1 Units at 02/11/18 1129    metoprolol succinate (TOPROL-XL) 24 hr tablet 100 mg  100 mg Oral Daily Saran Randall MD   100 mg at 02/12/18 0848    rivaroxaban (XARELTO) tablet 20 mg  20 mg Oral Daily With Breakfast Kianna Bey MD   20 mg at 02/12/18 0848    sertraline (ZOLOFT) tablet 50 mg  50 mg Oral Daily Kianna Bey MD   50 mg at 02/12/18 0849     Prescriptions Prior to Admission   Medication    LORazepam (ATIVAN) 0 5 mg tablet    acetaminophen (TYLENOL) 325 mg tablet    atorvastatin (LIPITOR) 10 mg tablet    cholecalciferol (VITAMIN D3) 1,000 units tablet    diltiazem (DILACOR XR) 120 MG 24 hr capsule    divalproex sodium (DEPAKOTE) 250 mg EC tablet    ferrous sulfate 325 (65 Fe) mg tablet    furosemide (LASIX) 80 mg tablet    hydrALAZINE (APRESOLINE) 50 mg tablet    lisinopril (ZESTRIL) 10 mg tablet    metoprolol succinate (TOPROL-XL) 100 mg 24 hr tablet    potassium chloride (K-DUR,KLOR-CON) 20 mEq tablet    risperiDONE (RisperDAL) 0 25 mg tablet    rivaroxaban (XARELTO) 20 mg tablet    sertraline (ZOLOFT) 100 mg tablet         Intake/Output Summary (Last 24 hours) at 02/12/18 0924  Last data filed at 02/12/18 0500   Gross per 24 hour   Intake           897 92 ml   Output             1900 ml   Net         -1002 08 ml       Physical Exam:  General appearance: distracted and no distress  Neck: no adenopathy, no JVD and thyroid not enlarged, symmetric, no tenderness/mass/nodules  Lungs: clear to auscultation bilaterally  Heart: regular rate and rhythm, S1, S2 normal, no murmur, click, rub or gallop  Abdomen: soft, non-tender; bowel sounds normal; no masses,  no organomegaly  Extremities: extremities normal, warm and well-perfused; no cyanosis, clubbing, or edema  Skin: Skin color, texture, turgor normal  No rashes or lesions  Neurologic: Grossly normal       Lab, Imaging and other studies: I have personally reviewed pertinent reports  Results from last 7 days  Lab Units 02/11/18  0446 02/10/18  0440 02/09/18  0450 02/08/18  1530   WBC Thousand/uL 6 61 7 33 8 50 11 62*   HEMOGLOBIN g/dL 10 6* 10 6* 10 9* 12 1   HEMATOCRIT % 34 7* 36 3* 37 8 39 9   PLATELETS Thousands/uL 302 323 317 375   NEUTROS PCT % 52 56  --  79*   LYMPHS PCT % 36 33  --  14   MONOS PCT % 8 8  --  5   EOS PCT % 4 3  --  2       Results from last 7 days  Lab Units 02/12/18  0554 02/11/18  0446 02/10/18  0440 02/08/18  1530   SODIUM mmol/L 142 144 147*  < > 149*   POTASSIUM mmol/L 3 6 3 7 4 0  < > 5 2   CHLORIDE mmol/L 108 109* 111*  < > 111*   CO2 mmol/L 29 29 30  < > 29   BUN mg/dL 14 19 26*  < > 39*   CREATININE mg/dL 1 35* 1 53* 1 94*  < > 3 65*   CALCIUM mg/dL 7 7* 7 8* 7 7*  < > 8 5   TOTAL PROTEIN g/dL  --   --   --   --  7 4   BILIRUBIN TOTAL mg/dL  --   --   --   --  0 30   ALK PHOS U/L  --   --   --   --  75   ALT U/L  --   --   --   --  31   AST U/L  --   --   --   --  24   GLUCOSE RANDOM mg/dL 74 84 80  < > 109   < > = values in this interval not displayed    Lab Results   Component Value Date    TROPONINI 0 03 02/08/2018         Lab Results   Component Value Date    BLOODCX No Growth After 5 Days  01/26/2018    BLOODCX No Growth After 5 Days  01/26/2018    URINECX No Growth <1000 cfu/mL 02/08/2018    URINECX (A) 01/27/2018     10,000-19,000 cfu/ml Methicillin Resistant Staphylococcus aureus    URINECX 10,000-19,000 cfu/ml Klebsiella pneumoniae ESBL (A) 01/27/2018    URINECX 2343-4616 cfu/ml Enterococcus faecalis (A) 01/27/2018       Imaging:  Results for orders placed during the hospital encounter of 02/08/18   XR chest 1 view portable    Narrative CHEST     INDICATION:  Confusion    COMPARISON:  1/20/2018 at 1:59 PM     VIEWS:   AP frontal    IMAGES:  2    FINDINGS:      Left transvenous defibrillator pacemaker leads are unchanged in position  Mild cardiomegaly appears stable  Lung volumes are low  Bibasilar atelectasis is present  No pneumothorax  Visualized osseous structures appear within normal limits for the patient's age  Impression Low lung volumes with bibasilar atelectasis  Workstation performed: [id number]       Results for orders placed during the hospital encounter of 01/26/18   XR chest pa & lateral    Narrative CHEST     INDICATION:  Cough    COMPARISON:  1/26/2018    VIEWS:  Frontal and lateral projections    IMAGES:  2    FINDINGS:  There is a stable pacer/ICD  Cardiomediastinal silhouette appears unremarkable  The lungs are clear  No pneumothorax or pleural effusion  Visualized osseous structures appear within normal limits for the patient's age  Impression No active pulmonary disease  Workstation performed: [id number]         PATIENT CENTERED ROUNDS: I have performed rounds with the nursing staff            Feliz Sousa DO

## 2018-02-12 NOTE — SOCIAL WORK
Received call from Keisha Dunn with protective services at [facility] [employer]  She requested patient medical info be faxed

## 2018-02-12 NOTE — PROGRESS NOTES
Progress Note - Nephrology   Miranda Yadav [de-identified] y o  male MRN: [id number]  Unit/Bed#: [address] Encounter: [id number]      Assessment / Plan:  1  VIVIAN likely prerenal from poor oral intake, diuretics and ACEi - b/l sCr unknown  Peak sCr 3 65 on admission  sCr improving  -UA shows proteinuria, 2-4 WBCs and RTEs  Repeat UA with microscopy still shows small blood, 2-4 RBCs and 0-1 WBCs with occasional bacteria and occasional uric acid crystals   -renal ultrasound negative for obstructive uropathy  -monitor renal indices  -now off hypotonic fluids  Does not have peripheral IV access  Significant improvement in renal function noted    2  Hypokalemia - repleted today  Monitor K/Mag levels    3  Hypernatremia - resolved off hypotonic fluids  His hx of dementia and aspiration precautions diet may lead to recurrent hypernatremia  Monitor sNa  4  Anemia - Hgb stable in mid 10s, continue oral iron as iron low at 33, ferritin 83, iron sat 15%(low) and TIBC low at 217      5  HTN - BP labile but would continue metoprolol 100 mg p o  daily with hold parameters for now  If average SBP > 140 persistently, would consider additional antihypertensive agent    6  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      7  afib - on diltiazem 120 mg p o  daily, Xarelto      Subjective:   He denies any chest pain or shortness of  He says he wants something to drink  No other complaints  Objective:     Vitals: Blood pressure 148/75, pulse 72, temperature 98 1 °F (36 7 °C), temperature source Oral, resp  rate 18, weight 110 kg (242 lb 8 1 oz), SpO2 95 %  ,Body mass index is 32 89 kg/m²  Temp (24hrs), Av °F (36 7 °C), Min:97 8 °F (36 6 °C), Max:98 1 °F (36 7 °C)      Weight (last 2 days)     Date/Time   Weight    18 1517  110 (242 51)    18 0711  110 (242 51)    02/10/18 1502  109 (240 52)    02/10/18 0730  108 (237 66)                Intake/Output Summary (Last 24 hours) at 18 1557  Last data filed at 02/12/18 1328   Gross per 24 hour   Intake          1437 92 ml   Output             1550 ml   Net          -112 08 ml     I/O last 24 hours: In: 1437 9 [P O :240; I V :1197 9]  Out: 2550 [Urine:2550]        Physical Exam:   Physical Exam   Constitutional: He appears well-developed and well-nourished  No distress  HENT:   Head: Normocephalic and atraumatic  Mouth/Throat: No oropharyngeal exudate  Eyes: Right eye exhibits no discharge  Left eye exhibits no discharge  No scleral icterus  Neck: Normal range of motion  Neck supple  No JVD present  Cardiovascular: Normal rate and regular rhythm  Exam reveals no friction rub  No murmur heard  Pulmonary/Chest: Effort normal and breath sounds normal  No respiratory distress  He has no wheezes  Abdominal: Soft  Bowel sounds are normal  He exhibits no distension  There is no tenderness  Genitourinary:   Genitourinary Comments: +suprapubic urinary catheter   Musculoskeletal: He exhibits no edema  Neurological: He is alert  He exhibits normal muscle tone  Skin: Skin is warm and dry  No rash noted  He is not diaphoretic  Psychiatric: He has a normal mood and affect  His behavior is normal    Nursing note and vitals reviewed        Invasive Devices     Drain            Suprapubic Catheter 18 Fr  16 days                Medications:    Scheduled Meds:  Current Facility-Administered Medications:  acetaminophen 650 mg Oral Q6H PRN Kianna Fernandez MD   atorvastatin 10 mg Oral Daily Kianna Fernandez MD   cholecalciferol 1,000 Units Oral Daily Kianna Fernandez MD   diltiazem 120 mg Oral Daily Levi Mcdonald MD   divalproex sodium 250 mg Oral BID Kianna Fernandez MD   ferrous sulfate 325 mg Oral Daily With Breakfast Kianna Fernandez MD   insulin lispro 1-6 Units Subcutaneous TID AC Caitlyn Dumont MD   metoprolol succinate 100 mg Oral Daily Caitlyn Dumont MD   rivaroxaban 20 mg Oral Daily With Breakfast Kianna Fernandez MD   sertraline 50 mg Oral Daily Kianna Hicks MD       PRN Meds:   acetaminophen    Continuous Infusions:         LAB RESULTS:        Results from last 7 days  Lab Units 02/12/18  0554 02/11/18  0446 02/10/18  0440 02/09/18  0450 02/08/18  1530   WBC Thousand/uL  --  6 61 7 33 8 50 11 62*   HEMOGLOBIN g/dL  --  10 6* 10 6* 10 9* 12 1   HEMATOCRIT %  --  34 7* 36 3* 37 8 39 9   PLATELETS Thousands/uL  --  302 323 317 375   NEUTROS PCT %  --  52 56  --  79*   LYMPHS PCT %  --  36 33  --  14   MONOS PCT %  --  8 8  --  5   EOS PCT %  --  4 3  --  2   SODIUM mmol/L 142 144 147* 152* 149*   POTASSIUM mmol/L 3 6 3 7 4 0 4 3 5 2   CHLORIDE mmol/L 108 109* 111* 117* 111*   CO2 mmol/L 29 29 30 28 29   BUN mg/dL 14 19 26* 35* 39*   CREATININE mg/dL 1 35* 1 53* 1 94* 2 68* 3 65*   CALCIUM mg/dL 7 7* 7 8* 7 7* 7 8* 8 5   TOTAL PROTEIN g/dL  --   --   --   --  7 4   BILIRUBIN TOTAL mg/dL  --   --   --   --  0 30   ALK PHOS U/L  --   --   --   --  75   ALT U/L  --   --   --   --  31   AST U/L  --   --   --   --  24   GLUCOSE RANDOM mg/dL 74 84 80 72 109   MAGNESIUM mg/dL 1 8  --  1 8 2 0  --    PHOSPHORUS mg/dL  --   --   --  4 1  --        CUTURES:  Lab Results   Component Value Date    BLOODCX No Growth After 5 Days  01/26/2018    BLOODCX No Growth After 5 Days  01/26/2018    URINECX No Growth <1000 cfu/mL 02/08/2018    URINECX (A) 01/27/2018     10,000-19,000 cfu/ml Methicillin Resistant Staphylococcus aureus    URINECX 10,000-19,000 cfu/ml Klebsiella pneumoniae ESBL (A) 01/27/2018    URINECX 1689-9883 cfu/ml Enterococcus faecalis (A) 01/27/2018    URINECX >100,000 cfu/ml Klebsiella pneumoniae ESBL (A) 01/26/2018    URINECX 40,000-49,000 cfu/ml Enterococcus faecalis (A) 01/26/2018                 Portions of the record may have been created with voice recognition software  Occasional wrong word or "sound a like" substitutions may have occurred due to the inherent limitations of voice recognition software   Read the chart carefully and recognize, using context, where substitutions have occurred  If you have any questions, please contact the dictating provider

## 2018-02-12 NOTE — SPEECH THERAPY NOTE
Speech Language/Pathology  Pt seen by speech last week  Placed in diet listed in the orders from the facility are puree/nectar thick  Ok to offer meds that cannot be crushed whole in puree  No re eval needed at this time

## 2018-02-13 VITALS
DIASTOLIC BLOOD PRESSURE: 86 MMHG | BODY MASS INDEX: 32.77 KG/M2 | OXYGEN SATURATION: 96 % | TEMPERATURE: 98.1 F | WEIGHT: 241.62 LBS | HEART RATE: 67 BPM | RESPIRATION RATE: 18 BRPM | SYSTOLIC BLOOD PRESSURE: 190 MMHG

## 2018-02-13 PROBLEM — I50.32 CHRONIC DIASTOLIC CONGESTIVE HEART FAILURE (HCC): Status: ACTIVE | Noted: 2018-02-13

## 2018-02-13 PROBLEM — I48.20 CHRONIC ATRIAL FIBRILLATION (HCC): Status: ACTIVE | Noted: 2018-02-13

## 2018-02-13 LAB
ANION GAP SERPL CALCULATED.3IONS-SCNC: 6 MMOL/L (ref 4–13)
BASOPHILS # BLD AUTO: 0.01 THOUSANDS/ΜL (ref 0–0.1)
BASOPHILS NFR BLD AUTO: 0 % (ref 0–1)
BUN SERPL-MCNC: 15 MG/DL (ref 5–25)
CALCIUM SERPL-MCNC: 8 MG/DL (ref 8.3–10.1)
CHLORIDE SERPL-SCNC: 110 MMOL/L (ref 100–108)
CO2 SERPL-SCNC: 27 MMOL/L (ref 21–32)
CREAT SERPL-MCNC: 1.26 MG/DL (ref 0.6–1.3)
EOSINOPHIL # BLD AUTO: 0.24 THOUSAND/ΜL (ref 0–0.61)
EOSINOPHIL NFR BLD AUTO: 4 % (ref 0–6)
ERYTHROCYTE [DISTWIDTH] IN BLOOD BY AUTOMATED COUNT: 15 % (ref 11.6–15.1)
GFR SERPL CREATININE-BSD FRML MDRD: 54 ML/MIN/1.73SQ M
GLUCOSE SERPL-MCNC: 130 MG/DL (ref 65–140)
GLUCOSE SERPL-MCNC: 74 MG/DL (ref 65–140)
GLUCOSE SERPL-MCNC: 82 MG/DL (ref 65–140)
HCT VFR BLD AUTO: 36.2 % (ref 36.5–49.3)
HGB BLD-MCNC: 11 G/DL (ref 12–17)
LYMPHOCYTES # BLD AUTO: 2.29 THOUSANDS/ΜL (ref 0.6–4.47)
LYMPHOCYTES NFR BLD AUTO: 34 % (ref 14–44)
MCH RBC QN AUTO: 25.8 PG (ref 26.8–34.3)
MCHC RBC AUTO-ENTMCNC: 30.4 G/DL (ref 31.4–37.4)
MCV RBC AUTO: 85 FL (ref 82–98)
MONOCYTES # BLD AUTO: 0.7 THOUSAND/ΜL (ref 0.17–1.22)
MONOCYTES NFR BLD AUTO: 10 % (ref 4–12)
NEUTROPHILS # BLD AUTO: 3.6 THOUSANDS/ΜL (ref 1.85–7.62)
NEUTS SEG NFR BLD AUTO: 52 % (ref 43–75)
PLATELET # BLD AUTO: 290 THOUSANDS/UL (ref 149–390)
PMV BLD AUTO: 10.7 FL (ref 8.9–12.7)
POTASSIUM SERPL-SCNC: 3.5 MMOL/L (ref 3.5–5.3)
RBC # BLD AUTO: 4.27 MILLION/UL (ref 3.88–5.62)
SODIUM SERPL-SCNC: 143 MMOL/L (ref 136–145)
WBC # BLD AUTO: 6.84 THOUSAND/UL (ref 4.31–10.16)

## 2018-02-13 PROCEDURE — 99232 SBSQ HOSP IP/OBS MODERATE 35: CPT | Performed by: INTERNAL MEDICINE

## 2018-02-13 PROCEDURE — 80048 BASIC METABOLIC PNL TOTAL CA: CPT | Performed by: INTERNAL MEDICINE

## 2018-02-13 PROCEDURE — 85025 COMPLETE CBC W/AUTO DIFF WBC: CPT | Performed by: INTERNAL MEDICINE

## 2018-02-13 PROCEDURE — 99239 HOSP IP/OBS DSCHRG MGMT >30: CPT | Performed by: INTERNAL MEDICINE

## 2018-02-13 PROCEDURE — 82948 REAGENT STRIP/BLOOD GLUCOSE: CPT

## 2018-02-13 RX ORDER — POTASSIUM CHLORIDE 20 MEQ/1
40 TABLET, EXTENDED RELEASE ORAL ONCE
Status: COMPLETED | OUTPATIENT
Start: 2018-02-13 | End: 2018-02-13

## 2018-02-13 RX ORDER — FUROSEMIDE 40 MG/1
40 TABLET ORAL DAILY
Qty: 30 TABLET | Refills: 0 | Status: SHIPPED | OUTPATIENT
Start: 2018-02-13

## 2018-02-13 RX ORDER — POTASSIUM CHLORIDE 20 MEQ/1
20 TABLET, EXTENDED RELEASE ORAL DAILY
Qty: 30 TABLET | Refills: 0 | Status: SHIPPED | OUTPATIENT
Start: 2018-02-13

## 2018-02-13 RX ADMIN — POTASSIUM CHLORIDE 40 MEQ: 20 TABLET, EXTENDED RELEASE ORAL at 08:45

## 2018-02-13 RX ADMIN — VITAMIN D, TAB 1000IU (100/BT) 1000 UNITS: 25 TAB at 08:34

## 2018-02-13 RX ADMIN — METOPROLOL SUCCINATE 100 MG: 50 TABLET, EXTENDED RELEASE ORAL at 08:34

## 2018-02-13 RX ADMIN — SERTRALINE HYDROCHLORIDE 50 MG: 50 TABLET ORAL at 08:35

## 2018-02-13 RX ADMIN — DIVALPROEX SODIUM 250 MG: 250 TABLET, DELAYED RELEASE ORAL at 08:34

## 2018-02-13 RX ADMIN — Medication 325 MG: at 08:34

## 2018-02-13 RX ADMIN — RIVAROXABAN 20 MG: 20 TABLET, FILM COATED ORAL at 08:35

## 2018-02-13 RX ADMIN — ATORVASTATIN CALCIUM 10 MG: 10 TABLET, FILM COATED ORAL at 08:34

## 2018-02-13 RX ADMIN — DILTIAZEM HYDROCHLORIDE 120 MG: 120 CAPSULE, COATED, EXTENDED RELEASE ORAL at 08:34

## 2018-02-13 NOTE — ASSESSMENT & PLAN NOTE
Patient was admitted with acute metabolic encephalopathy due to acute kidney injury, dehydration  Patient's lisinopril for some might were placed on hold and he was hydrated with normal saline solution  His encephalopathy and acute kidney injury gradually resolved  Patient's creatinine on discharge is 1 26 which is close to his baseline  He will be discharged on lisinopril 10 mg daily and furosemide 40 mg daily which is a decrease compared to 80 mg b i d  he was on before  He will have a repeat basic metabolic profile done in 2 days

## 2018-02-13 NOTE — DISCHARGE SUMMARY
Discharge- Bo Valiente 1935, [de-identified] y o  male MRN: [id number]    Unit/Bed#: [address] [place] [address] Encounter: [id number]    Primary Care Provider: Wanda Guo MD   Date and time admitted to hospital: 2/8/2018  3:09 PM        * VIVIAN (acute kidney injury) [facility])   Assessment & Plan    Acute kidney injury was due to prerenal causes  Ultrasound the kidneys showed no hydronephrosis and he had no increase due to urinary residual   He was followed by Nephrology during the hospital stay        Encephalopathy, metabolic   Assessment & Plan    Patient was admitted with acute metabolic encephalopathy due to acute kidney injury, dehydration  Patient's lisinopril for some might were placed on hold and he was hydrated with normal saline solution  His encephalopathy and acute kidney injury gradually resolved  Patient's creatinine on discharge is 1 26 which is close to his baseline  He will be discharged on lisinopril 10 mg daily and furosemide 40 mg daily which is a decrease compared to 80 mg b i d  he was on before  He will have a repeat basic metabolic profile done in 2 days  Hypernatremia   Assessment & Plan    This was present on admission and resolved with IV hydration  Sodium is 143 on discharge        Paroxysmal atrial fibrillation [facility])   Assessment & Plan    Heart rates are controlled with Cardizem and Toprol  Patient is on Xarelto for CVA prophylaxis  Dehydration   Assessment & Plan    Resolved with IV fluids        Chronic diastolic congestive heart failure (HCC)   Assessment & Plan    I resume Lasix 40 mg daily on discharge  This is a dose decrease compared to what he had been taking  On day of discharge lungs were clear to auscultation without any crackles or wheezing  He had no lower extremity edema                  Hospital Course:      Bo Valiente is a [de-identified] y o  male patient who originally presented to the hospital on 2/8/2018 due to encephalopathy    Please see above list of diagnoses and related plan for additional information  Condition at Discharge:  good      Discharge instructions/Information to patient and family:   See after visit summary for information provided to patient and family  Provisions for Follow-Up Care:  See after visit summary for information related to follow-up care and any pertinent home health orders  Disposition:     Chris Clifford Cuate at [facility]       Discharge Statement:  I spent 34 minutes discharging the patient  This time was spent on the day of discharge  I had direct contact with the patient on the day of discharge  Greater than 50% of the total time was spent examining patient, answering all patient questions, arranging and discussing plan of care with patient as well as directly providing post-discharge instructions  Additional time then spent on discharge activities  Discharge Medications:  See after visit summary for reconciled discharge medications provided to patient and family        ** Please Note: This note has been constructed using a voice recognition system **

## 2018-02-13 NOTE — ASSESSMENT & PLAN NOTE
Acute kidney injury was due to prerenal causes    Ultrasound the kidneys showed no hydronephrosis and he had no increase due to urinary residual   He was followed by Nephrology during the hospital stay

## 2018-02-13 NOTE — ASSESSMENT & PLAN NOTE
I resume Lasix 40 mg daily on discharge  This is a dose decrease compared to what he had been taking  On day of discharge lungs were clear to auscultation without any crackles or wheezing    He had no lower extremity edema

## 2018-02-13 NOTE — SOCIAL WORK
[facility] does not have an appropriate bed available at this time  Malika Delacruz can accept patient  Notified patients daughter of above  She is agreeable to [address]

## 2018-02-13 NOTE — SOCIAL WORK
Patient is for discharge today to [place] [id number] at 1600 via [facility] ambulance  Called and notified patients daughter, Rena Prather

## 2018-02-14 ENCOUNTER — TELEPHONE (OUTPATIENT)
Dept: UROLOGY | Facility: AMBULATORY SURGERY CENTER | Age: 83
End: 2018-02-14

## 2018-02-14 NOTE — TELEPHONE ENCOUNTER
Mora called stating that her father follows with Dr Francisca Hardin and would like to cancel appt with Dr Alvah Gosselin  Also contacted Dr Mario Alberto Farfan as she had a question about the Urolift procedure  She will look that information up on line  And discuss with Dr Francisca Hardin

## 2018-02-15 ENCOUNTER — TELEPHONE (OUTPATIENT)
Dept: NEPHROLOGY | Facility: CLINIC | Age: 83
End: 2018-02-15

## 2018-02-15 DIAGNOSIS — N17.9 AKI (ACUTE KIDNEY INJURY) (HCC): ICD-10-CM

## 2018-02-15 DIAGNOSIS — E87.0 HYPERNATREMIA: Primary | ICD-10-CM

## 2018-03-15 ENCOUNTER — OFFICE VISIT (OUTPATIENT)
Dept: NEPHROLOGY | Facility: HOSPITAL | Age: 83
End: 2018-03-15
Payer: MEDICARE

## 2018-03-15 VITALS — DIASTOLIC BLOOD PRESSURE: 78 MMHG | SYSTOLIC BLOOD PRESSURE: 120 MMHG

## 2018-03-15 DIAGNOSIS — I10 ESSENTIAL HYPERTENSION: ICD-10-CM

## 2018-03-15 DIAGNOSIS — I48.91 ATRIAL FIBRILLATION, UNSPECIFIED TYPE (HCC): ICD-10-CM

## 2018-03-15 DIAGNOSIS — N17.9 AKI (ACUTE KIDNEY INJURY) (HCC): Primary | ICD-10-CM

## 2018-03-15 DIAGNOSIS — D50.9 IRON DEFICIENCY ANEMIA, UNSPECIFIED IRON DEFICIENCY ANEMIA TYPE: ICD-10-CM

## 2018-03-15 DIAGNOSIS — I50.32 CHRONIC DIASTOLIC CONGESTIVE HEART FAILURE (HCC): ICD-10-CM

## 2018-03-15 DIAGNOSIS — R33.9 URINARY RETENTION: ICD-10-CM

## 2018-03-15 PROCEDURE — 99214 OFFICE O/P EST MOD 30 MIN: CPT | Performed by: INTERNAL MEDICINE

## 2018-03-15 NOTE — PATIENT INSTRUCTIONS
1  VIVIAN likely prerenal from poor oral intake, diuretics and ACEi in hospital with unknown baseline  - d/c sCr 1 26, peak 3 65  Repeat sCr 1 28  May have underlying CKD  Would repeat BMP next month  -of note, sCr 1 18 as of 2/19/18  Would need 3 months worth of data to declare if patient has CKD  -if sCr 1 1-1 2 range in past so very possible he has CKD of aging    -avoid nephrotoxins/fleet enema/IV dye  -avoid nonsteroidals(ibuprofen, aleve, advil, motrin, celebrex, naproxen, toradol)     2  HTN - BP well controlled on metoprolol 100 mg daily, lisinopril 10mg daily     3  Anemia - Hgb stable at 11  6  continue oral iron as iron low at 33, ferritin 83, iron sat 15%(low) and TIBC low at 217        4  Dementia      5  afib - on diltiazem 120 mg daily, Xarelto     6  hypokalemia - resolved on repeat bloodwork 2/23/18 K 3 8    7  Chronic diastolic HF - continue furosemide 40mg daily along with potassium supplement  Also on ACEi  Recommend daily weights  8 urinary retention - +SPT, follows with urology    I recommend mobility for the patient if possible  Mobility would help prevent blood clots as he has a history of this in the past  Elevate legs when patient in bed  RTC in 3 months

## 2018-03-15 NOTE — PROGRESS NOTES
NEPHROLOGY OUTPATIENT PROGRESS NOTE   Jean Carlos Vicente 82 y o  male MRN: [id number]  DATE: 3/15/2018  Reason for visit:   Chief Complaint   Patient presents with    Follow-up        Patient Instructions   1  VIVIAN likely prerenal from poor oral intake, diuretics and ACEi in hospital with unknown baseline  - d/c sCr 1 26, peak 3 65  Repeat sCr 1 28  May have underlying CKD  Would repeat BMP next month  -of note, sCr 1 18 as of 2/19/18  Would need 3 months worth of data to declare if patient has CKD  -if sCr 1 1-1 2 range in past so very possible he has CKD of aging    -avoid nephrotoxins/fleet enema/IV dye  -avoid nonsteroidals(ibuprofen, aleve, advil, motrin, celebrex, naproxen, toradol)     2  HTN - BP well controlled on metoprolol 100 mg daily, lisinopril 10mg daily     3  Anemia - Hgb stable at 11  6  continue oral iron as iron low at 33, ferritin 83, iron sat 15%(low) and TIBC low at 217        4  Dementia      5  afib - on diltiazem 120 mg daily, Xarelto     6  hypokalemia - resolved on repeat bloodwork 2/23/18 K 3 8    7  Chronic diastolic HF - continue furosemide 40mg daily along with potassium supplement  Also on ACEi  Recommend daily weights  8 urinary retention - +SPT, follows with urology    I recommend mobility for the patient if possible  Mobility would help prevent blood clots as he has a history of this in the past  Elevate legs when patient in bed  RTC in 3 months  Dena Goss was seen today for follow-up  Diagnoses and all orders for this visit:    VIVIAN (acute kidney injury) ([facility] [address] )  -     Urinalysis with microscopic; Future  -     Basic metabolic panel; Future  -     Magnesium; Future  -     Phosphorus; Future    Essential hypertension    Chronic diastolic congestive heart failure (HCC)    Iron deficiency anemia, unspecified iron deficiency anemia type    Urinary retention    Atrial fibrillation, unspecified type (HCC)  -     rivaroxaban (XARELTO) 20 mg tablet;  Take 1 tablet (20 mg total) by mouth daily with breakfast        Assessment/Plan:  1  VIVIAN likely prerenal from poor oral intake, diuretics and ACEi in hospital with unknown baseline  - d/c sCr 1 26, peak 3 65  Repeat sCr 1 28  May have underlying CKD  Would repeat BMP next month  -of note, sCr 1 18 as of 2/19/18  Would need 3 months worth of data to declare if patient has CKD  -if sCr 1 1-1 2 range in past so very possible he has CKD of aging    -avoid nephrotoxins/fleet enema/IV dye  -avoid nonsteroidals(ibuprofen, aleve, advil, motrin, celebrex, naproxen, toradol)     2  HTN - BP well controlled on metoprolol 100 mg daily, lisinopril 10mg daily     3  Anemia - Hgb stable at 11  6  continue oral iron as iron low at 33, ferritin 83, iron sat 15%(low) and TIBC low at 217        4  Dementia      5  afib - on diltiazem 120 mg daily, Xarelto     6  hypokalemia - resolved on repeat bloodwork 2/23/18 K 3 8    7  Chronic diastolic HF - continue furosemide 40mg daily along with potassium supplement  Also on ACEi  Recommend daily weights  8 urinary retention - +SPT, follows with urology    I recommend mobility for the patient if possible  Mobility would help prevent blood clots as he has a history of this in the past  Elevate legs when patient in bed        SUBJECTIVE / INTERVAL HISTORY:  82 y o  male presents in hospital follow up of VIVIAN  Ed Duran denies any recent illness/hospitalizations/medication changes since last hospital visit  Denies NSAID use  He is on a different pain medication  Review of Systems   Unable to perform ROS: Dementia       OBJECTIVE:  /78 (BP Location: Right arm, Patient Position: Sitting, Cuff Size: Adult)  There is no height or weight on file to calculate BMI  Physical exam:  Physical Exam   Constitutional: He appears well-developed and well-nourished  No distress  HENT:   Head: Normocephalic and atraumatic  Mouth/Throat: No oropharyngeal exudate  Eyes: Right eye exhibits no discharge   Left eye exhibits no discharge  No scleral icterus  Neck: Normal range of motion  Neck supple  No thyromegaly present  Cardiovascular: Normal rate, regular rhythm and normal heart sounds  Pulmonary/Chest: Effort normal and breath sounds normal  He has no wheezes  He has no rales  Abdominal: Soft  Bowel sounds are normal  He exhibits no distension  There is no tenderness  Genitourinary:   Genitourinary Comments: +suprapubic catheter   Musculoskeletal: Normal range of motion  He exhibits edema  Neurological: He is alert  awake   Skin: Skin is warm and dry  No rash noted  He is not diaphoretic  Psychiatric:   Flat affect   Vitals reviewed        Medications:    Current Outpatient Prescriptions:     acetaminophen (TYLENOL) 325 mg tablet, Take 650 mg by mouth every 6 (six) hours as needed for mild pain, Disp: , Rfl:     atorvastatin (LIPITOR) 10 mg tablet, Take 10 mg by mouth daily, Disp: , Rfl:     cholecalciferol (VITAMIN D3) 1,000 units tablet, Take 1,000 Units by mouth daily, Disp: , Rfl:     diltiazem (DILACOR XR) 120 MG 24 hr capsule, Take 120 mg by mouth daily, Disp: , Rfl:     divalproex sodium (DEPAKOTE) 250 mg EC tablet, Take 250 mg by mouth 2 (two) times a day, Disp: , Rfl:     ferrous sulfate 325 (65 Fe) mg tablet, Take 325 mg by mouth daily with breakfast, Disp: , Rfl:     furosemide (LASIX) 40 mg tablet, Take 1 tablet (40 mg total) by mouth daily, Disp: 30 tablet, Rfl: 0    hydrALAZINE (APRESOLINE) 50 mg tablet, Take 50 mg by mouth 3 (three) times a day, Disp: , Rfl:     lisinopril (ZESTRIL) 10 mg tablet, Take 1 tablet (10 mg total) by mouth daily, Disp: 30 tablet, Rfl: 0    metoprolol succinate (TOPROL-XL) 100 mg 24 hr tablet, Take 100 mg by mouth daily, Disp: , Rfl:     potassium chloride (K-DUR,KLOR-CON) 20 mEq tablet, Take 1 tablet (20 mEq total) by mouth daily, Disp: 30 tablet, Rfl: 0    risperiDONE (RisperDAL) 0 25 mg tablet, Take 0 5 mg by mouth 2 (two) times a day  , Disp: , Rfl:   rivaroxaban (XARELTO) 20 mg tablet, Take 20 mg by mouth, Disp: , Rfl:     sertraline (ZOLOFT) 100 mg tablet, Take 50 mg by mouth daily, Disp: , Rfl:     Allergies: Allergies as of 03/15/2018    (No Known Allergies)       The following portions of the patient's history were reviewed and updated as appropriate: past family history, past surgical history and problem list     Laboratory Results:  Lab Results   Component Value Date    GLUCOSE 74 02/13/2018    CALCIUM 8 0 (L) 02/13/2018     02/13/2018    K 3 5 02/13/2018    CO2 27 02/13/2018     (H) 02/13/2018    BUN 15 02/13/2018    CREATININE 1 26 02/13/2018        Lab Results   Component Value Date    CALCIUM 8 0 (L) 02/13/2018    PHOS 4 1 02/09/2018       Portions of the record may have been created with voice recognition software   Occasional wrong word or "sound a like" substitutions may have occurred due to the inherent limitations of voice recognition software   Read the chart carefully and recognize, using context, where substitutions have occurred

## 2018-03-15 NOTE — LETTER
March 15, 2018     Sujata Robles MD  [address] [place] [address] [address]  [address]    Patient: Mariangel Paul   YOB: 1935   Date of Visit: 3/15/2018       Dear Dr Hall Hiss: Thank you for referring Marilee Hopson to me for evaluation  Below are my notes for this consultation  If you have questions, please do not hesitate to call me  I look forward to following your patient along with you  Sincerely,        Medina Colon, DO        CC: No Recipients  Darl Me, DO  3/15/2018  3:19 PM  Sign at close encounter  [address] [place] 82 y o  male MRN: [id number]  DATE: 3/15/2018  Reason for visit:   Chief Complaint   Patient presents with    Follow-up        Patient Instructions   1  VIVIAN likely prerenal from poor oral intake, diuretics and ACEi in hospital with unknown baseline  - d/c sCr 1 26, peak 3 65  Repeat sCr 1 28  May have underlying CKD  Would repeat BMP next month  -of note, sCr 1 18 as of 2/19/18  Would need 3 months worth of data to declare if patient has CKD  -if sCr 1 1-1 2 range in past so very possible he has CKD of aging    -avoid nephrotoxins/fleet enema/IV dye  -avoid nonsteroidals(ibuprofen, aleve, advil, motrin, celebrex, naproxen, toradol)     2  HTN - BP well controlled on metoprolol 100 mg daily, lisinopril 10mg daily     3  Anemia - Hgb stable at 11  6  continue oral iron as iron low at 33, ferritin 83, iron sat 15%(low) and TIBC low at 217        4  Dementia      5  afib - on diltiazem 120 mg daily, Xarelto     6  hypokalemia - resolved on repeat bloodwork 2/23/18 K 3 8    7  Chronic diastolic HF - continue furosemide 40mg daily along with potassium supplement  Also on ACEi  Recommend daily weights  8 urinary retention - +SPT, follows with urology    I recommend mobility for the patient if possible   Mobility would help prevent blood clots as he has a history of this in the past  Elevate legs when patient in bed     RTC in 3 months  Anneliese Wilson was seen today for follow-up  Diagnoses and all orders for this visit:    VIVIAN (acute kidney injury) ([address] )  -     Urinalysis with microscopic; Future  -     Basic metabolic panel; Future  -     Magnesium; Future  -     Phosphorus; Future    Essential hypertension    Chronic diastolic congestive heart failure (HCC)    Iron deficiency anemia, unspecified iron deficiency anemia type    Urinary retention    Atrial fibrillation, unspecified type (HCC)  -     rivaroxaban (XARELTO) 20 mg tablet; Take 1 tablet (20 mg total) by mouth daily with breakfast        Assessment/Plan:  1  VIVIAN likely prerenal from poor oral intake, diuretics and ACEi in hospital with unknown baseline  - d/c sCr 1 26, peak 3 65  Repeat sCr 1 28  May have underlying CKD  Would repeat BMP next month  -of note, sCr 1 18 as of 2/19/18  Would need 3 months worth of data to declare if patient has CKD  -if sCr 1 1-1 2 range in past so very possible he has CKD of aging    -avoid nephrotoxins/fleet enema/IV dye  -avoid nonsteroidals(ibuprofen, aleve, advil, motrin, celebrex, naproxen, toradol)     2  HTN - BP well controlled on metoprolol 100 mg daily, lisinopril 10mg daily     3  Anemia - Hgb stable at 11  6  continue oral iron as iron low at 33, ferritin 83, iron sat 15%(low) and TIBC low at 217        4  Dementia      5  afib - on diltiazem 120 mg daily, Xarelto     6  hypokalemia - resolved on repeat bloodwork 2/23/18 K 3 8    7  Chronic diastolic HF - continue furosemide 40mg daily along with potassium supplement  Also on ACEi  Recommend daily weights  8 urinary retention - +SPT, follows with urology    I recommend mobility for the patient if possible  Mobility would help prevent blood clots as he has a history of this in the past  Elevate legs when patient in bed        SUBJECTIVE / INTERVAL HISTORY:  82 y o  male presents in hospital follow up of VIVIAN       Cordell Neal denies any recent illness/hospitalizations/medication changes since last hospital visit  Denies NSAID use  He is on a different pain medication  Review of Systems   Unable to perform ROS: Dementia       OBJECTIVE:  /78 (BP Location: Right arm, Patient Position: Sitting, Cuff Size: Adult)  There is no height or weight on file to calculate BMI  Physical exam:  Physical Exam   Constitutional: He appears well-developed and well-nourished  No distress  HENT:   Head: Normocephalic and atraumatic  Mouth/Throat: No oropharyngeal exudate  Eyes: Right eye exhibits no discharge  Left eye exhibits no discharge  No scleral icterus  Neck: Normal range of motion  Neck supple  No thyromegaly present  Cardiovascular: Normal rate, regular rhythm and normal heart sounds  Pulmonary/Chest: Effort normal and breath sounds normal  He has no wheezes  He has no rales  Abdominal: Soft  Bowel sounds are normal  He exhibits no distension  There is no tenderness  Genitourinary:   Genitourinary Comments: +suprapubic catheter   Musculoskeletal: Normal range of motion  He exhibits edema  Neurological: He is alert  awake   Skin: Skin is warm and dry  No rash noted  He is not diaphoretic  Psychiatric:   Flat affect   Vitals reviewed        Medications:    Current Outpatient Prescriptions:     acetaminophen (TYLENOL) 325 mg tablet, Take 650 mg by mouth every 6 (six) hours as needed for mild pain, Disp: , Rfl:     atorvastatin (LIPITOR) 10 mg tablet, Take 10 mg by mouth daily, Disp: , Rfl:     cholecalciferol (VITAMIN D3) 1,000 units tablet, Take 1,000 Units by mouth daily, Disp: , Rfl:     diltiazem (DILACOR XR) 120 MG 24 hr capsule, Take 120 mg by mouth daily, Disp: , Rfl:     divalproex sodium (DEPAKOTE) 250 mg EC tablet, Take 250 mg by mouth 2 (two) times a day, Disp: , Rfl:     ferrous sulfate 325 (65 Fe) mg tablet, Take 325 mg by mouth daily with breakfast, Disp: , Rfl:     furosemide (LASIX) 40 mg tablet, Take 1 tablet (40 mg total) by mouth daily, Disp: 30 tablet, Rfl: 0    hydrALAZINE (APRESOLINE) 50 mg tablet, Take 50 mg by mouth 3 (three) times a day, Disp: , Rfl:     lisinopril (ZESTRIL) 10 mg tablet, Take 1 tablet (10 mg total) by mouth daily, Disp: 30 tablet, Rfl: 0    metoprolol succinate (TOPROL-XL) 100 mg 24 hr tablet, Take 100 mg by mouth daily, Disp: , Rfl:     potassium chloride (K-DUR,KLOR-CON) 20 mEq tablet, Take 1 tablet (20 mEq total) by mouth daily, Disp: 30 tablet, Rfl: 0    risperiDONE (RisperDAL) 0 25 mg tablet, Take 0 5 mg by mouth 2 (two) times a day  , Disp: , Rfl:     rivaroxaban (XARELTO) 20 mg tablet, Take 20 mg by mouth, Disp: , Rfl:     sertraline (ZOLOFT) 100 mg tablet, Take 50 mg by mouth daily, Disp: , Rfl:     Allergies: Allergies as of 03/15/2018    (No Known Allergies)       The following portions of the patient's history were reviewed and updated as appropriate: past family history, past surgical history and problem list     Laboratory Results:  Lab Results   Component Value Date    GLUCOSE 74 02/13/2018    CALCIUM 8 0 (L) 02/13/2018     02/13/2018    K 3 5 02/13/2018    CO2 27 02/13/2018     (H) 02/13/2018    BUN 15 02/13/2018    CREATININE 1 26 02/13/2018        Lab Results   Component Value Date    CALCIUM 8 0 (L) 02/13/2018    PHOS 4 1 02/09/2018       Portions of the record may have been created with voice recognition software   Occasional wrong word or "sound a like" substitutions may have occurred due to the inherent limitations of voice recognition software   Read the chart carefully and recognize, using context, where substitutions have occurred

## 2018-07-15 NOTE — SOCIAL WORK
As per Dr Ramona Santoyo patient can be discharged to [facility] and continue his 2 days of IV Ertapenem , he is getting a new peripheral   Line  [facility] is aware and confirmed they could provide the IV Abx  Spoke with patient's daughter, Mauri Boykin at [phone] and she has no preference for BLS  Arranged [facility] to transport at 5:30 today  Notififed admissions at [facility] and Oralia Ramos, his nurse 
As per Soraya Topete in admissions at [facility] - they will have a bed for patient at discharge  Called and notified daughter Mauri Byrdbraxton 
Patient is a resident of [facility] since 12/8/2017  He was a resident of [address] [place] [address] prior to that  As per Nina at [facility] - patient is dependent for care, is a babak lift to [facility] chair  Called and spoke with daughter Francisco Moyer, who states that she does not want patient to return to [facility] if there is a bed available at [facility]  She states that patient has been there multiple times in the past and currently has been working with a  to get him moved to [facility]  She requests a referral be sent to [facility]  Referral via [facility] to [facility]  Await bed availability 
[facility]

## 2021-03-22 NOTE — PROGRESS NOTES
Progress Note - Nephrology   Shelton Lomeli [de-identified] y o  male MRN: [id number]  Unit/Bed#: [address] Encounter: [id number]      Assessment / Plan:  1  VIVIAN likely prerenal from poor oral intake, diuretics and ACEi - b/l sCr unknown  Peak sCr 3 65 on admission  sCr improving  Now 1 26  -UA shows proteinuria, 2-4 WBCs and RTEs  Repeat UA with microscopy still shows small blood, 2-4 RBCs and 0-1 WBCs with occasional bacteria and occasional uric acid crystals   -renal ultrasound negative for obstructive uropathy  -monitor renal indices outpatient     2  Hypokalemia -given 40meq KDur repletion today     3  Hypernatremia - resolved off hypotonic fluids  His hx of dementia and aspiration precautions diet may lead to recurrent hypernatremia  Monitor sNa outpatient     4  Anemia - Hgb stable at 11  continue oral iron as iron low at 33, ferritin 83, iron sat 15%(low) and TIBC low at 217       5  HTN - BP acceptable on metoprolol 100 mg p o  daily with hold parameters for now  If average SBP > 140 persistently, would consider additional antihypertensive agent     6  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       7  afib - on diltiazem 120 mg p o  daily, Xarelto    Ok for discharge from renal perspective  Consider outpatient nephrology follow up if sCr remains elevated upon recheck outpatient  Subjective:   Unable to elicit HPI review of systems as patient demented  No complaints  Objective:     Vitals: Blood pressure 141/64, pulse 64, temperature (!) 96 8 °F (36 °C), temperature source Tympanic, resp  rate 18, weight 110 kg (241 lb 10 oz), SpO2 96 %  ,Body mass index is 32 77 kg/m²  Temp (24hrs), Av 1 °F (36 7 °C), Min:96 8 °F (36 °C), Max:99 3 °F (37 4 °C)      Weight (last 2 days)     Date/Time   Weight    18 0736  110 (241 62)    18 1517  110 (242 51)    18 0711  110 (242 51)                Intake/Output Summary (Last 24 hours) at 18 1349  Last data filed at 18 Refill request for estradiol (CLIMARA) 0.05 MG/24HR once weekly patch    Last office visit 3/18/2021  Next office visit 3/22/2022    Refills done per last office note.     0451   Gross per 24 hour   Intake                0 ml   Output             1325 ml   Net            -1325 ml     I/O last 24 hours: In: 540 [P O :240; I V :300]  Out: 12 [Urine:1325]        Physical Exam:   Physical Exam   Constitutional: He appears well-developed and well-nourished  No distress  HENT:   Head: Normocephalic and atraumatic  Mouth/Throat: No oropharyngeal exudate  Eyes: Right eye exhibits no discharge  Left eye exhibits no discharge  No scleral icterus  Neck: Normal range of motion  Neck supple  No JVD present  No thyromegaly present  Cardiovascular: Normal rate and regular rhythm  Exam reveals no friction rub  No murmur heard  Pulmonary/Chest: Breath sounds normal  No respiratory distress  He has no wheezes  Abdominal: Soft  Bowel sounds are normal  He exhibits no distension  There is no tenderness  Genitourinary:   Genitourinary Comments: +suprapubic catheter   Musculoskeletal: He exhibits no edema  Lymphadenopathy:     He has no cervical adenopathy  Neurological: He is alert  He exhibits normal muscle tone  +tremor   Skin: Skin is warm and dry  No rash noted  He is not diaphoretic  Psychiatric:   Flat affect   Nursing note and vitals reviewed  Invasive Devices     Drain            Suprapubic Catheter 18 Fr   17 days                Medications:    Scheduled Meds:  Current Facility-Administered Medications:  acetaminophen 650 mg Oral Q6H PRN Kianna Dempsey MD   atorvastatin 10 mg Oral Daily Kianna Dempsey MD   cholecalciferol 1,000 Units Oral Daily Kianna Dempsey MD   diltiazem 120 mg Oral Daily Jurgen Sears MD   divalproex sodium 250 mg Oral BID Kianna Dempsey MD   ferrous sulfate 325 mg Oral Daily With Breakfast Kianna Dempsey MD   insulin lispro 1-6 Units Subcutaneous TID AC Harley Taylor MD   metoprolol succinate 100 mg Oral Daily Harley Taylor MD   rivaroxaban 20 mg Oral Daily With Breakfast Kianna Dempsey MD   sertraline 50 mg Oral Daily Kianna Samaniego MD       PRN Meds:   acetaminophen    Continuous Infusions:         LAB RESULTS:        Results from last 7 days  Lab Units 02/13/18  0445 02/12/18  0554 02/11/18  0446 02/10/18  0440 02/09/18  0450 02/08/18  1530   WBC Thousand/uL 6 84  --  6 61 7 33 8 50 11 62*   HEMOGLOBIN g/dL 11 0*  --  10 6* 10 6* 10 9* 12 1   HEMATOCRIT % 36 2*  --  34 7* 36 3* 37 8 39 9   PLATELETS Thousands/uL 290  --  302 323 317 375   NEUTROS PCT % 52  --  52 56  --  79*   LYMPHS PCT % 34  --  36 33  --  14   MONOS PCT % 10  --  8 8  --  5   EOS PCT % 4  --  4 3  --  2   SODIUM mmol/L 143 142 144 147* 152* 149*   POTASSIUM mmol/L 3 5 3 6 3 7 4 0 4 3 5 2   CHLORIDE mmol/L 110* 108 109* 111* 117* 111*   CO2 mmol/L 27 29 29 30 28 29   BUN mg/dL 15 14 19 26* 35* 39*   CREATININE mg/dL 1 26 1 35* 1 53* 1 94* 2 68* 3 65*   CALCIUM mg/dL 8 0* 7 7* 7 8* 7 7* 7 8* 8 5   TOTAL PROTEIN g/dL  --   --   --   --   --  7 4   BILIRUBIN TOTAL mg/dL  --   --   --   --   --  0 30   ALK PHOS U/L  --   --   --   --   --  75   ALT U/L  --   --   --   --   --  31   AST U/L  --   --   --   --   --  24   GLUCOSE RANDOM mg/dL 74 74 84 80 72 109   MAGNESIUM mg/dL  --  1 8  --  1 8 2 0  --    PHOSPHORUS mg/dL  --   --   --   --  4 1  --        CUTURES:  Lab Results   Component Value Date    BLOODCX No Growth After 5 Days  01/26/2018    BLOODCX No Growth After 5 Days  01/26/2018    URINECX No Growth <1000 cfu/mL 02/08/2018    URINECX (A) 01/27/2018     10,000-19,000 cfu/ml Methicillin Resistant Staphylococcus aureus    URINECX 10,000-19,000 cfu/ml Klebsiella pneumoniae ESBL (A) 01/27/2018    URINECX 1678-7555 cfu/ml Enterococcus faecalis (A) 01/27/2018    URINECX >100,000 cfu/ml Klebsiella pneumoniae ESBL (A) 01/26/2018    URINECX 40,000-49,000 cfu/ml Enterococcus faecalis (A) 01/26/2018                 Portions of the record may have been created with voice recognition software   Occasional wrong word or "sound a like" substitutions may have occurred due to the inherent limitations of voice recognition software  Read the chart carefully and recognize, using context, where substitutions have occurred  If you have any questions, please contact the dictating provider